# Patient Record
Sex: FEMALE | Race: WHITE | Employment: FULL TIME | URBAN - NONMETROPOLITAN AREA
[De-identification: names, ages, dates, MRNs, and addresses within clinical notes are randomized per-mention and may not be internally consistent; named-entity substitution may affect disease eponyms.]

---

## 2017-08-30 ENCOUNTER — OFFICE VISIT (OUTPATIENT)
Dept: FAMILY MEDICINE CLINIC | Age: 37
End: 2017-08-30
Payer: OTHER GOVERNMENT

## 2017-08-30 VITALS
OXYGEN SATURATION: 98 % | RESPIRATION RATE: 18 BRPM | HEART RATE: 72 BPM | WEIGHT: 139.5 LBS | BODY MASS INDEX: 21.9 KG/M2 | TEMPERATURE: 98.8 F | HEIGHT: 67 IN | DIASTOLIC BLOOD PRESSURE: 70 MMHG | SYSTOLIC BLOOD PRESSURE: 100 MMHG

## 2017-08-30 DIAGNOSIS — F41.1 GAD (GENERALIZED ANXIETY DISORDER): ICD-10-CM

## 2017-08-30 DIAGNOSIS — F41.9 ANXIETY: ICD-10-CM

## 2017-08-30 DIAGNOSIS — F41.0 PANIC DISORDER WITHOUT AGORAPHOBIA: Primary | ICD-10-CM

## 2017-08-30 PROBLEM — J30.9 ALLERGIC RHINITIS: Status: ACTIVE | Noted: 2017-08-30

## 2017-08-30 PROCEDURE — 99214 OFFICE O/P EST MOD 30 MIN: CPT | Performed by: INTERNAL MEDICINE

## 2017-08-30 RX ORDER — ESCITALOPRAM OXALATE 5 MG/1
5 TABLET ORAL DAILY
Qty: 30 TABLET | Refills: 3 | Status: SHIPPED | OUTPATIENT
Start: 2017-08-30 | End: 2017-12-15 | Stop reason: SDUPTHER

## 2017-08-30 RX ORDER — BUSPIRONE HYDROCHLORIDE 5 MG/1
TABLET ORAL
Qty: 60 TABLET | Refills: 1 | Status: SHIPPED | OUTPATIENT
Start: 2017-08-30 | End: 2018-07-01 | Stop reason: SDUPTHER

## 2017-08-30 ASSESSMENT — ENCOUNTER SYMPTOMS
SHORTNESS OF BREATH: 0
SORE THROAT: 0
EYE PAIN: 0
SINUS PRESSURE: 0
CHEST TIGHTNESS: 1
EYE DISCHARGE: 0
VOICE CHANGE: 0
EYE REDNESS: 0
DIARRHEA: 0
NAUSEA: 1
WHEEZING: 0
VOMITING: 0
RHINORRHEA: 0
COLOR CHANGE: 0
COUGH: 0
BLOOD IN STOOL: 0
ABDOMINAL PAIN: 0

## 2017-08-30 ASSESSMENT — PATIENT HEALTH QUESTIONNAIRE - PHQ9
SUM OF ALL RESPONSES TO PHQ9 QUESTIONS 1 & 2: 2
2. FEELING DOWN, DEPRESSED OR HOPELESS: 1
SUM OF ALL RESPONSES TO PHQ QUESTIONS 1-9: 2
1. LITTLE INTEREST OR PLEASURE IN DOING THINGS: 1

## 2017-10-17 ENCOUNTER — OFFICE VISIT (OUTPATIENT)
Dept: OBGYN | Age: 37
End: 2017-10-17
Payer: OTHER GOVERNMENT

## 2017-10-17 VITALS
DIASTOLIC BLOOD PRESSURE: 87 MMHG | HEIGHT: 66 IN | BODY MASS INDEX: 23.3 KG/M2 | HEART RATE: 59 BPM | WEIGHT: 145 LBS | SYSTOLIC BLOOD PRESSURE: 125 MMHG

## 2017-10-17 DIAGNOSIS — Z01.419 WELL WOMAN EXAM WITH ROUTINE GYNECOLOGICAL EXAM: Primary | ICD-10-CM

## 2017-10-17 DIAGNOSIS — Z12.4 SCREENING FOR CERVICAL CANCER: ICD-10-CM

## 2017-10-17 PROCEDURE — 99395 PREV VISIT EST AGE 18-39: CPT | Performed by: NURSE PRACTITIONER

## 2017-10-17 ASSESSMENT — ENCOUNTER SYMPTOMS
RESPIRATORY NEGATIVE: 1
GASTROINTESTINAL NEGATIVE: 1
EYES NEGATIVE: 1

## 2017-10-17 NOTE — PROGRESS NOTES
deep cervical and no posterior cervical adenopathy present. Right axillary: No pectoral and no lateral adenopathy present. Left axillary: No pectoral and no lateral adenopathy present. Right: No inguinal, no supraclavicular and no epitrochlear adenopathy present. Left: No inguinal, no supraclavicular and no epitrochlear adenopathy present. Neurological: She is alert and oriented to person, place, and time. She has normal reflexes. Skin: Skin is warm and dry. No rash noted. Psychiatric: She has a normal mood and affect. Nursing note and vitals reviewed. 1. Well woman exam with routine gynecological exam     2. Screening for cervical cancer  PAP SMEAR       MEDICATIONS:  No orders of the defined types were placed in this encounter. ORDERS:  Orders Placed This Encounter   Procedures    PAP SMEAR       PLAN:  Pap collected  Having no complaints and will return in 1 year or prn  Patient Instructions        Well Visit, Ages 25 to 48: Care Instructions  Your Care Instructions  Physical exams can help you stay healthy. Your doctor has checked your overall health and may have suggested ways to take good care of yourself. He or she also may have recommended tests. At home, you can help prevent illness with healthy eating, regular exercise, and other steps. Follow-up care is a key part of your treatment and safety. Be sure to make and go to all appointments, and call your doctor if you are having problems. It's also a good idea to know your test results and keep a list of the medicines you take. How can you care for yourself at home? · Reach and stay at a healthy weight. This will lower your risk for many problems, such as obesity, diabetes, heart disease, and high blood pressure. · Get at least 30 minutes of physical activity on most days of the week. Walking is a good choice.  You also may want to do other activities, such as running, swimming, cycling, or playing tennis or team sports. Discuss any changes in your exercise program with your doctor. · Do not smoke or allow others to smoke around you. If you need help quitting, talk to your doctor about stop-smoking programs and medicines. These can increase your chances of quitting for good. · Talk to your doctor about whether you have any risk factors for sexually transmitted infections (STIs). Having one sex partner (who does not have STIs and does not have sex with anyone else) is a good way to avoid these infections. · Use birth control if you do not want to have children at this time. Talk with your doctor about the choices available and what might be best for you. · Protect your skin from too much sun. When you're outdoors from 10 a.m. to 4 p.m., stay in the shade or cover up with clothing and a hat with a wide brim. Wear sunglasses that block UV rays. Even when it's cloudy, put broad-spectrum sunscreen (SPF 30 or higher) on any exposed skin. · See a dentist one or two times a year for checkups and to have your teeth cleaned. · Wear a seat belt in the car. · Drink alcohol in moderation, if at all. That means no more than 2 drinks a day for men and 1 drink a day for women. Follow your doctor's advice about when to have certain tests. These tests can spot problems early. For everyone  · Cholesterol. Have the fat (cholesterol) in your blood tested after age 21. Your doctor will tell you how often to have this done based on your age, family history, or other things that can increase your risk for heart disease. · Blood pressure. Have your blood pressure checked during a routine doctor visit. Your doctor will tell you how often to check your blood pressure based on your age, your blood pressure results, and other factors. · Vision. Talk with your doctor about how often to have a glaucoma test.  · Diabetes. Ask your doctor whether you should have tests for diabetes. · Colon cancer. Have a test for colon cancer at age 48.  You may have one of several tests. If you are younger than 48, you may need a test earlier if you have any risk factors. Risk factors include whether you already had a precancerous polyp removed from your colon or whether your parent, brother, sister, or child has had colon cancer. For women  · Breast exam and mammogram. Talk to your doctor about when you should have a clinical breast exam and a mammogram. Medical experts differ on whether and how often women under 50 should have these tests. Your doctor can help you decide what is right for you. · Pap test and pelvic exam. Begin Pap tests at age 24. A Pap test is the best way to find cervical cancer. The test often is part of a pelvic exam. Ask how often to have this test.  · Tests for sexually transmitted infections (STIs). Ask whether you should have tests for STIs. You may be at risk if you have sex with more than one person, especially if your partners do not wear condoms. For men  · Tests for sexually transmitted infections (STIs). Ask whether you should have tests for STIs. You may be at risk if you have sex with more than one person, especially if you do not wear a condom. · Testicular cancer exam. Ask your doctor whether you should check your testicles regularly. · Prostate exam. Talk to your doctor about whether you should have a blood test (called a PSA test) for prostate cancer. Experts differ on whether and when men should have this test. Some experts suggest it if you are older than 39 and are -American or have a father or brother who got prostate cancer when he was younger than 72. When should you call for help? Watch closely for changes in your health, and be sure to contact your doctor if you have any problems or symptoms that concern you. Where can you learn more? Go to [email or web address] org and sign in to your [employer] account.  Enter P072 in the [place] box to learn more about \"Well Visit, Ages 25 to 50: Care Instructions. \"     If you do not have an account, please click on the \"Sign Up Now\" link. Current as of: July 19, 2016  Content Version: 11.3  © 9006-1000 [employer]. Care instructions adapted under license by [facility] ([facility]). If you have questions about a medical condition or this instruction, always ask your healthcare professional. [address] any warranty or liability for your use of this information.

## 2017-11-02 ENCOUNTER — OFFICE VISIT (OUTPATIENT)
Dept: FAMILY MEDICINE CLINIC | Age: 37
End: 2017-11-02
Payer: OTHER GOVERNMENT

## 2017-11-02 VITALS
OXYGEN SATURATION: 98 % | HEART RATE: 79 BPM | TEMPERATURE: 97.5 F | HEIGHT: 66 IN | SYSTOLIC BLOOD PRESSURE: 116 MMHG | BODY MASS INDEX: 23.3 KG/M2 | RESPIRATION RATE: 18 BRPM | WEIGHT: 145 LBS | DIASTOLIC BLOOD PRESSURE: 70 MMHG

## 2017-11-02 DIAGNOSIS — Z00.00 ANNUAL PHYSICAL EXAM: ICD-10-CM

## 2017-11-02 DIAGNOSIS — F41.1 GAD (GENERALIZED ANXIETY DISORDER): Primary | ICD-10-CM

## 2017-11-02 PROCEDURE — 99213 OFFICE O/P EST LOW 20 MIN: CPT | Performed by: INTERNAL MEDICINE

## 2017-11-02 ASSESSMENT — ENCOUNTER SYMPTOMS
RHINORRHEA: 0
EYE REDNESS: 0
WHEEZING: 0
CHEST TIGHTNESS: 0
EYE PAIN: 0
VOICE CHANGE: 0
SORE THROAT: 0
ABDOMINAL PAIN: 0
COLOR CHANGE: 0
COUGH: 0
SINUS PRESSURE: 0
EYE DISCHARGE: 0
DIARRHEA: 0
VOMITING: 0
SHORTNESS OF BREATH: 0
BLOOD IN STOOL: 0

## 2017-11-02 NOTE — PROGRESS NOTES
Ramila Lay is a 40 y.o. female who presents today for   Chief Complaint   Patient presents with    Anxiety     Patient presents today for follow up on anxiety. HPI  38y/o WF here for f/u on PREM after starting escitalopram 6 weeks ago. She feels much better since starting the medicine and she is not having any full blown panic attacks. She still has some anxiety at times but she is able to calm down. She is sleeping well and appetite is normal. She is not having any mood swings. She has not needed to take the buspar either. She does not feel like she needs to increase her escitalopram. She is able to drive herself now and she works at her [employer]. Review of Systems   Constitutional: Negative for appetite change, chills, fatigue and fever. HENT: Negative for ear pain, rhinorrhea, sinus pressure, sore throat and voice change. Eyes: Negative for pain, discharge and redness. Respiratory: Negative for cough, chest tightness, shortness of breath and wheezing. Cardiovascular: Negative for chest pain and palpitations. Gastrointestinal: Negative for abdominal pain, blood in stool, diarrhea and vomiting. Endocrine: Negative for cold intolerance and polydipsia. Genitourinary: Negative for dysuria and hematuria. Musculoskeletal: Negative for arthralgias, myalgias, neck pain and neck stiffness. Skin: Negative for color change and rash. Neurological: Negative for dizziness and headaches. Hematological: Negative for adenopathy. Does not bruise/bleed easily. Psychiatric/Behavioral: Negative for confusion, dysphoric mood, sleep disturbance and suicidal ideas. See HPI       Past Medical History:   Diagnosis Date    Adjustment disorder with mixed anxiety and depressed mood     Allergic rhinitis, cause unspecified     Anxiety state, unspecified     Palpitations     Radial styloid tenosynovitis     wrist pain while breast feeding.     Unspecified hypothyroidism Current Outpatient Prescriptions   Medication Sig Dispense Refill    escitalopram (LEXAPRO) 5 MG tablet Take 1 tablet by mouth daily 30 tablet 3    busPIRone (BUSPAR) 5 MG tablet Take 1-2 tabs every 8 hours as needed for anxiety/panic attacks 60 tablet 1     No current facility-administered medications for this visit. No Known Allergies    Past Surgical History:   Procedure Laterality Date    WISDOM TOOTH EXTRACTION         Social History   Substance Use Topics    Smoking status: Light Tobacco Smoker    Smokeless tobacco: Never Used      Comment: 4 cigarettes per day    Alcohol use Yes      Comment: 2 drinks per week       Family History   Problem Relation Age of Onset    High Blood Pressure Mother     High Blood Pressure Father     Diabetes Father     Stroke Maternal Grandfather     Heart Disease Maternal Grandfather     Colon Polyps Maternal Grandfather      benign adenomatous polyp of large intestine    Diabetes Maternal Grandfather        /70   Pulse 79   Temp 97.5 °F (36.4 °C)   Resp 18   Ht 5' 6\" (1.676 m)   Wt 145 lb (65.8 kg)   LMP 10/25/2017   SpO2 98%   BMI 23.40 kg/m²     Physical Exam   Constitutional: She is oriented to person, place, and time. No distress. Eyes: Conjunctivae and EOM are normal. Pupils are equal, round, and reactive to light. Neck: Neck supple. No JVD present. No thyromegaly present. Cardiovascular: Normal rate, regular rhythm, normal heart sounds and intact distal pulses. Exam reveals no gallop and no friction rub. No murmur heard. Pulmonary/Chest: Effort normal and breath sounds normal.   Abdominal: Soft. Bowel sounds are normal. She exhibits no distension. There is no tenderness. Musculoskeletal: She exhibits no edema or tenderness. Lymphadenopathy:     She has no cervical adenopathy. Neurological: She is alert and oriented to person, place, and time. Skin: Skin is warm. No rash noted.    Psychiatric: She has a normal mood and affect. Her behavior is normal. Judgment and thought content normal.       Assessment:    ICD-10-CM ICD-9-CM    1. PREM (generalized anxiety disorder) F41.1 300.02    2. Annual physical exam Z00.00 V70.0 CBC Auto Differential      Comprehensive Metabolic Panel      Lipid Panel      TSH without Reflex      T4, Free       Plan:  Raffaele Ravi was seen today for anxiety. Diagnoses and all orders for this visit:    PREM (generalized anxiety disorder)    Annual physical exam  -     CBC Auto Differential; Future  -     Comprehensive Metabolic Panel; Future  -     Lipid Panel; Future  -     TSH without Reflex; Future  -     T4, Free; Future    1. Continue escitalopram at current dose for PREM which is improving with buspar prn anxiety/panic attacks. 2. Patient requests screening labs due to FHx of diabetes. 3. F/u in 3 mths for ECPE with labs as ordered prior to appmt. 4. Patient declines flu vaccine today    Orders Placed This Encounter   Procedures    CBC Auto Differential     Standing Status:   Future     Standing Expiration Date:   11/2/2018    Comprehensive Metabolic Panel     Standing Status:   Future     Standing Expiration Date:   11/2/2018    Lipid Panel     Standing Status:   Future     Standing Expiration Date:   11/2/2018     Order Specific Question:   Is Patient Fasting?/# of Hours     Answer:   yes/8 hrs    TSH without Reflex     Standing Status:   Future     Standing Expiration Date:   11/2/2018    T4, Free     Standing Status:   Future     Standing Expiration Date:   11/2/2018     No orders of the defined types were placed in this encounter. There are no discontinued medications. There are no Patient Instructions on file for this visit. Patient voices understanding and agrees to plans along with risks and benefits of plan. Counseling:  Raffaele Ravi 's case, medications and options were discussed in detail. Patient was instructed to call the office if she   questions regarding her treatment. Should her conditions worsen, she should return to office to be reassessed by Dr. Madie Ndiaye. she  Should to go the closest Emergency Department for any emergency. They verbalized understanding the above instructions. Return in about 3 months (around 2/2/2018) for well visit, recheck mood.

## 2017-12-15 DIAGNOSIS — F41.1 GAD (GENERALIZED ANXIETY DISORDER): ICD-10-CM

## 2017-12-15 DIAGNOSIS — F41.0 PANIC DISORDER WITHOUT AGORAPHOBIA: ICD-10-CM

## 2017-12-15 RX ORDER — ESCITALOPRAM OXALATE 5 MG/1
5 TABLET ORAL DAILY
Qty: 30 TABLET | Refills: 5 | Status: SHIPPED | OUTPATIENT
Start: 2017-12-15 | End: 2018-07-01 | Stop reason: SDUPTHER

## 2018-01-04 ENCOUNTER — TELEPHONE (OUTPATIENT)
Dept: FAMILY MEDICINE CLINIC | Age: 38
End: 2018-01-04

## 2018-01-29 DIAGNOSIS — Z00.00 ANNUAL PHYSICAL EXAM: ICD-10-CM

## 2018-01-29 LAB
ALBUMIN SERPL-MCNC: 4.2 G/DL (ref 3.5–5.2)
ALP BLD-CCNC: 46 U/L (ref 35–104)
ALT SERPL-CCNC: 13 U/L (ref 5–33)
ANION GAP SERPL CALCULATED.3IONS-SCNC: 11 MMOL/L (ref 7–19)
AST SERPL-CCNC: 15 U/L (ref 5–32)
BASOPHILS ABSOLUTE: 0 K/UL (ref 0–0.2)
BASOPHILS RELATIVE PERCENT: 0.6 % (ref 0–1)
BILIRUB SERPL-MCNC: 0.5 MG/DL (ref 0.2–1.2)
BUN BLDV-MCNC: 14 MG/DL (ref 6–20)
CALCIUM SERPL-MCNC: 9.1 MG/DL (ref 8.6–10)
CHLORIDE BLD-SCNC: 101 MMOL/L (ref 98–111)
CHOLESTEROL, TOTAL: 171 MG/DL (ref 160–199)
CO2: 27 MMOL/L (ref 22–29)
CREAT SERPL-MCNC: 0.6 MG/DL (ref 0.5–0.9)
EOSINOPHILS ABSOLUTE: 0.1 K/UL (ref 0–0.6)
EOSINOPHILS RELATIVE PERCENT: 1.9 % (ref 0–5)
GFR NON-AFRICAN AMERICAN: >60
GLUCOSE BLD-MCNC: 90 MG/DL (ref 74–109)
HCT VFR BLD CALC: 39.8 % (ref 37–47)
HDLC SERPL-MCNC: 69 MG/DL (ref 65–121)
HEMOGLOBIN: 13.1 G/DL (ref 12–16)
LDL CHOLESTEROL CALCULATED: 89 MG/DL
LYMPHOCYTES ABSOLUTE: 2.5 K/UL (ref 1.1–4.5)
LYMPHOCYTES RELATIVE PERCENT: 38.9 % (ref 20–40)
MCH RBC QN AUTO: 32.9 PG (ref 27–31)
MCHC RBC AUTO-ENTMCNC: 32.9 G/DL (ref 33–37)
MCV RBC AUTO: 100 FL (ref 81–99)
MONOCYTES ABSOLUTE: 0.5 K/UL (ref 0–0.9)
MONOCYTES RELATIVE PERCENT: 7.6 % (ref 0–10)
NEUTROPHILS ABSOLUTE: 3.3 K/UL (ref 1.5–7.5)
NEUTROPHILS RELATIVE PERCENT: 50.7 % (ref 50–65)
PDW BLD-RTO: 12.2 % (ref 11.5–14.5)
PLATELET # BLD: 244 K/UL (ref 130–400)
PMV BLD AUTO: 9.5 FL (ref 9.4–12.3)
POTASSIUM SERPL-SCNC: 4.1 MMOL/L (ref 3.5–5)
RBC # BLD: 3.98 M/UL (ref 4.2–5.4)
SODIUM BLD-SCNC: 139 MMOL/L (ref 136–145)
T4 FREE: 1 NG/DL (ref 0.9–1.7)
TOTAL PROTEIN: 6.6 G/DL (ref 6.6–8.7)
TRIGL SERPL-MCNC: 63 MG/DL (ref 0–149)
TSH SERPL DL<=0.05 MIU/L-ACNC: 8.39 UIU/ML (ref 0.27–4.2)
WBC # BLD: 6.4 K/UL (ref 4.8–10.8)

## 2018-07-01 DIAGNOSIS — F41.1 GAD (GENERALIZED ANXIETY DISORDER): ICD-10-CM

## 2018-07-01 DIAGNOSIS — F41.0 PANIC DISORDER WITHOUT AGORAPHOBIA: ICD-10-CM

## 2018-07-02 RX ORDER — BUSPIRONE HYDROCHLORIDE 5 MG/1
TABLET ORAL
Qty: 60 TABLET | Refills: 3 | Status: SHIPPED | OUTPATIENT
Start: 2018-07-02 | End: 2020-02-24

## 2018-07-02 RX ORDER — ESCITALOPRAM OXALATE 5 MG/1
5 TABLET ORAL DAILY
Qty: 30 TABLET | Refills: 3 | Status: SHIPPED | OUTPATIENT
Start: 2018-07-02 | End: 2018-11-12 | Stop reason: SDUPTHER

## 2018-09-17 DIAGNOSIS — R79.89 ELEVATED TSH: Primary | ICD-10-CM

## 2018-10-23 DIAGNOSIS — R79.89 ELEVATED TSH: ICD-10-CM

## 2018-10-23 LAB
T4 FREE: 1 NG/DL (ref 0.9–1.7)
TSH SERPL DL<=0.05 MIU/L-ACNC: 6.74 UIU/ML (ref 0.27–4.2)

## 2018-11-12 DIAGNOSIS — F41.0 PANIC DISORDER WITHOUT AGORAPHOBIA: ICD-10-CM

## 2018-11-12 DIAGNOSIS — F41.1 GAD (GENERALIZED ANXIETY DISORDER): ICD-10-CM

## 2018-11-12 RX ORDER — ESCITALOPRAM OXALATE 5 MG/1
5 TABLET ORAL DAILY
Qty: 5 TABLET | Refills: 0 | Status: SHIPPED | OUTPATIENT
Start: 2018-11-12 | End: 2018-11-16 | Stop reason: SDUPTHER

## 2018-11-16 ENCOUNTER — OFFICE VISIT (OUTPATIENT)
Dept: FAMILY MEDICINE CLINIC | Age: 38
End: 2018-11-16
Payer: OTHER GOVERNMENT

## 2018-11-16 VITALS
HEART RATE: 63 BPM | OXYGEN SATURATION: 99 % | WEIGHT: 167.8 LBS | TEMPERATURE: 97.7 F | SYSTOLIC BLOOD PRESSURE: 120 MMHG | DIASTOLIC BLOOD PRESSURE: 86 MMHG | BODY MASS INDEX: 26.97 KG/M2 | HEIGHT: 66 IN

## 2018-11-16 DIAGNOSIS — E03.8 SUBCLINICAL HYPOTHYROIDISM: ICD-10-CM

## 2018-11-16 DIAGNOSIS — N94.6 DYSMENORRHEA: ICD-10-CM

## 2018-11-16 DIAGNOSIS — Z00.00 ENCOUNTER FOR WELL ADULT EXAM WITHOUT ABNORMAL FINDINGS: Primary | ICD-10-CM

## 2018-11-16 DIAGNOSIS — F41.1 GAD (GENERALIZED ANXIETY DISORDER): ICD-10-CM

## 2018-11-16 PROCEDURE — 99395 PREV VISIT EST AGE 18-39: CPT | Performed by: CLINICAL NURSE SPECIALIST

## 2018-11-16 RX ORDER — ESCITALOPRAM OXALATE 5 MG/1
5 TABLET ORAL DAILY
Qty: 90 TABLET | Refills: 3 | Status: SHIPPED | OUTPATIENT
Start: 2018-11-16 | End: 2019-11-19 | Stop reason: ALTCHOICE

## 2018-11-16 ASSESSMENT — ENCOUNTER SYMPTOMS
FACIAL SWELLING: 0
BACK PAIN: 0
NAUSEA: 0
SHORTNESS OF BREATH: 0
CONSTIPATION: 1
EYE REDNESS: 0
EYE DISCHARGE: 0
SORE THROAT: 0
DIARRHEA: 0
COLOR CHANGE: 0
CHEST TIGHTNESS: 0
ABDOMINAL PAIN: 0
TROUBLE SWALLOWING: 0
VOMITING: 0
ABDOMINAL DISTENTION: 0
WHEEZING: 0
SINUS PRESSURE: 0
COUGH: 0
EYE PAIN: 0

## 2018-11-16 ASSESSMENT — PATIENT HEALTH QUESTIONNAIRE - PHQ9
2. FEELING DOWN, DEPRESSED OR HOPELESS: 0
SUM OF ALL RESPONSES TO PHQ9 QUESTIONS 1 & 2: 0
1. LITTLE INTEREST OR PLEASURE IN DOING THINGS: 0
SUM OF ALL RESPONSES TO PHQ QUESTIONS 1-9: 0
SUM OF ALL RESPONSES TO PHQ QUESTIONS 1-9: 0

## 2019-11-19 ENCOUNTER — OFFICE VISIT (OUTPATIENT)
Dept: FAMILY MEDICINE CLINIC | Age: 39
End: 2019-11-19
Payer: OTHER GOVERNMENT

## 2019-11-19 VITALS
DIASTOLIC BLOOD PRESSURE: 86 MMHG | BODY MASS INDEX: 26.16 KG/M2 | OXYGEN SATURATION: 100 % | HEIGHT: 66 IN | TEMPERATURE: 97.2 F | WEIGHT: 162.8 LBS | HEART RATE: 65 BPM | SYSTOLIC BLOOD PRESSURE: 116 MMHG

## 2019-11-19 DIAGNOSIS — Z00.00 ENCOUNTER FOR WELL ADULT EXAM WITHOUT ABNORMAL FINDINGS: ICD-10-CM

## 2019-11-19 DIAGNOSIS — F41.1 GAD (GENERALIZED ANXIETY DISORDER): ICD-10-CM

## 2019-11-19 DIAGNOSIS — E03.8 SUBCLINICAL HYPOTHYROIDISM: ICD-10-CM

## 2019-11-19 DIAGNOSIS — B02.9 HERPES ZOSTER WITHOUT COMPLICATION: ICD-10-CM

## 2019-11-19 DIAGNOSIS — Z00.00 ENCOUNTER FOR WELL ADULT EXAM WITHOUT ABNORMAL FINDINGS: Primary | ICD-10-CM

## 2019-11-19 LAB
ALBUMIN SERPL-MCNC: 4.8 G/DL (ref 3.5–5.2)
ALP BLD-CCNC: 63 U/L (ref 35–104)
ALT SERPL-CCNC: 13 U/L (ref 5–33)
ANION GAP SERPL CALCULATED.3IONS-SCNC: 13 MMOL/L (ref 7–19)
AST SERPL-CCNC: 13 U/L (ref 5–32)
BILIRUB SERPL-MCNC: 0.3 MG/DL (ref 0.2–1.2)
BUN BLDV-MCNC: 12 MG/DL (ref 6–20)
CALCIUM SERPL-MCNC: 9.6 MG/DL (ref 8.6–10)
CHLORIDE BLD-SCNC: 102 MMOL/L (ref 98–111)
CHOLESTEROL, TOTAL: 210 MG/DL (ref 160–199)
CO2: 25 MMOL/L (ref 22–29)
CREAT SERPL-MCNC: 0.7 MG/DL (ref 0.5–0.9)
GFR NON-AFRICAN AMERICAN: >60
GLUCOSE BLD-MCNC: 101 MG/DL (ref 74–109)
HCT VFR BLD CALC: 44.4 % (ref 37–47)
HDLC SERPL-MCNC: 63 MG/DL (ref 65–121)
HEMOGLOBIN: 14.4 G/DL (ref 12–16)
LDL CHOLESTEROL CALCULATED: 129 MG/DL
MCH RBC QN AUTO: 32.4 PG (ref 27–31)
MCHC RBC AUTO-ENTMCNC: 32.4 G/DL (ref 33–37)
MCV RBC AUTO: 99.8 FL (ref 81–99)
PDW BLD-RTO: 12.3 % (ref 11.5–14.5)
PLATELET # BLD: 298 K/UL (ref 130–400)
PMV BLD AUTO: 9.5 FL (ref 9.4–12.3)
POTASSIUM SERPL-SCNC: 4.7 MMOL/L (ref 3.5–5)
RBC # BLD: 4.45 M/UL (ref 4.2–5.4)
SODIUM BLD-SCNC: 140 MMOL/L (ref 136–145)
T4 FREE: 1.11 NG/DL (ref 0.93–1.7)
TOTAL PROTEIN: 7.4 G/DL (ref 6.6–8.7)
TRIGL SERPL-MCNC: 91 MG/DL (ref 0–149)
TSH SERPL DL<=0.05 MIU/L-ACNC: 5.52 UIU/ML (ref 0.27–4.2)
WBC # BLD: 6.3 K/UL (ref 4.8–10.8)

## 2019-11-19 PROCEDURE — 99395 PREV VISIT EST AGE 18-39: CPT | Performed by: CLINICAL NURSE SPECIALIST

## 2019-11-19 RX ORDER — METHYLPREDNISOLONE 4 MG/1
TABLET ORAL
Qty: 21 TABLET | Refills: 0 | Status: SHIPPED | OUTPATIENT
Start: 2019-11-19 | End: 2019-11-25

## 2019-11-19 ASSESSMENT — PATIENT HEALTH QUESTIONNAIRE - PHQ9
1. LITTLE INTEREST OR PLEASURE IN DOING THINGS: 0
SUM OF ALL RESPONSES TO PHQ QUESTIONS 1-9: 0
2. FEELING DOWN, DEPRESSED OR HOPELESS: 0
SUM OF ALL RESPONSES TO PHQ9 QUESTIONS 1 & 2: 0
SUM OF ALL RESPONSES TO PHQ QUESTIONS 1-9: 0

## 2019-11-19 ASSESSMENT — ENCOUNTER SYMPTOMS
EYE REDNESS: 0
DIARRHEA: 0
EYE DISCHARGE: 0
COUGH: 0
ABDOMINAL PAIN: 0
CONSTIPATION: 0
SHORTNESS OF BREATH: 0
CHEST TIGHTNESS: 0
BACK PAIN: 0
COLOR CHANGE: 0
TROUBLE SWALLOWING: 0
WHEEZING: 0
SINUS PRESSURE: 0
FACIAL SWELLING: 0
EYE PAIN: 0
SORE THROAT: 0

## 2019-11-20 ENCOUNTER — TELEPHONE (OUTPATIENT)
Dept: FAMILY MEDICINE CLINIC | Age: 39
End: 2019-11-20

## 2020-02-24 RX ORDER — BUSPIRONE HYDROCHLORIDE 5 MG/1
TABLET ORAL
Qty: 60 TABLET | Refills: 3 | Status: SHIPPED | OUTPATIENT
Start: 2020-02-24 | End: 2021-07-22

## 2020-03-23 ENCOUNTER — TELEPHONE (OUTPATIENT)
Dept: FAMILY MEDICINE CLINIC | Age: 40
End: 2020-03-23

## 2020-04-16 ENCOUNTER — TELEMEDICINE (OUTPATIENT)
Dept: FAMILY MEDICINE CLINIC | Age: 40
End: 2020-04-16
Payer: OTHER GOVERNMENT

## 2020-04-16 PROCEDURE — 99214 OFFICE O/P EST MOD 30 MIN: CPT | Performed by: FAMILY MEDICINE

## 2020-04-16 RX ORDER — ONDANSETRON 4 MG/1
4 TABLET, ORALLY DISINTEGRATING ORAL 3 TIMES DAILY PRN
Qty: 60 TABLET | Refills: 0 | Status: SHIPPED | OUTPATIENT
Start: 2020-04-16 | End: 2020-09-25 | Stop reason: ALTCHOICE

## 2020-04-16 RX ORDER — ESCITALOPRAM OXALATE 5 MG/1
5 TABLET ORAL DAILY
Qty: 30 TABLET | Refills: 2 | Status: SHIPPED | OUTPATIENT
Start: 2020-04-16 | End: 2020-06-22 | Stop reason: ALTCHOICE

## 2020-04-16 RX ORDER — BUSPIRONE HYDROCHLORIDE 7.5 MG/1
TABLET ORAL
Qty: 60 TABLET | Refills: 3 | Status: CANCELLED | OUTPATIENT
Start: 2020-04-16

## 2020-04-16 ASSESSMENT — ENCOUNTER SYMPTOMS
COLOR CHANGE: 0
BACK PAIN: 0
STRIDOR: 0
FACIAL SWELLING: 0
NAUSEA: 1
EYE DISCHARGE: 0
EYE ITCHING: 0
VOMITING: 1
APNEA: 0

## 2020-04-16 NOTE — PROGRESS NOTES
as needed, she asked about the maximum dose and advised her she can take up to 60 mg a day. - escitalopram (LEXAPRO) 5 MG tablet; Take 1 tablet by mouth daily  Dispense: 30 tablet; Refill: 2    2. Nausea and vomiting, intractability of vomiting not specified, unspecified vomiting type  - ondansetron (ZOFRAN-ODT) 4 MG disintegrating tablet; Take 1 tablet by mouth 3 times daily as needed for Nausea or Vomiting  Dispense: 60 tablet; Refill: 0      Return in about 2 months (around 6/16/2020), or if symptoms worsen or fail to improve, for anxiety, Dr Juliet Goldstein, re-started Lexapro. Efrain Sher is a 45 y.o. female being evaluated by a Virtual Visit (video visit) encounter to address concerns as mentioned above. A caregiver was present when appropriate. Due to this being a TeleHealth encounter (During QSS-90 public health emergency), evaluation of the following organ systems was limited: Vitals/Constitutional/EENT/Resp/CV/GI//MS/Neuro/Skin/Heme-Lymph-Imm. Pursuant to the emergency declaration under the [address] [place] [address], [address] [place] [address] authority and the [employer] Act, this Virtual Visit was conducted with patient's (and/or legal guardian's) consent, to reduce the patient's risk of exposure to COVID-19 and provide necessary medical care. The patient (and/or legal guardian) has also been advised to contact this office for worsening conditions or problems, and seek emergency medical treatment and/or call 911 if deemed necessary. Services were provided through a video synchronous discussion virtually to substitute for in-person clinic visit. Patient and provider were located at their individual homes. --Brenton Hernandez MD on 4/16/2020 at 9:50 PM    An electronic signature was used to authenticate this note.

## 2020-04-20 ENCOUNTER — TELEPHONE (OUTPATIENT)
Dept: FAMILY MEDICINE CLINIC | Age: 40
End: 2020-04-20

## 2020-05-20 ENCOUNTER — TELEPHONE (OUTPATIENT)
Dept: GASTROENTEROLOGY | Age: 40
End: 2020-05-20

## 2020-05-20 NOTE — TELEPHONE ENCOUNTER
5/20/20 - Called patient to schedule EGD for Dyspepsia / abd pain.   She started some medication that has helped her symptoms and she wants to hold off on having the EGD

## 2020-06-22 ENCOUNTER — TELEMEDICINE (OUTPATIENT)
Dept: FAMILY MEDICINE CLINIC | Age: 40
End: 2020-06-22
Payer: OTHER GOVERNMENT

## 2020-06-22 PROCEDURE — 99214 OFFICE O/P EST MOD 30 MIN: CPT | Performed by: INTERNAL MEDICINE

## 2020-06-22 RX ORDER — ESCITALOPRAM OXALATE 5 MG/1
TABLET ORAL
Qty: 14 TABLET | Refills: 0 | Status: SHIPPED | OUTPATIENT
Start: 2020-06-22 | End: 2020-09-25 | Stop reason: ALTCHOICE

## 2020-06-22 RX ORDER — DESVENLAFAXINE 25 MG/1
25 TABLET, EXTENDED RELEASE ORAL DAILY
Qty: 30 TABLET | Refills: 2 | Status: SHIPPED | OUTPATIENT
Start: 2020-06-22 | End: 2020-09-25 | Stop reason: SDUPTHER

## 2020-06-22 ASSESSMENT — ENCOUNTER SYMPTOMS
SHORTNESS OF BREATH: 0
RHINORRHEA: 0
DIARRHEA: 0
NAUSEA: 1
COLOR CHANGE: 0
WHEEZING: 0
VOMITING: 0
BLOOD IN STOOL: 0
VOICE CHANGE: 0
SORE THROAT: 0
EYE REDNESS: 0
CHEST TIGHTNESS: 0
COUGH: 0
EYE DISCHARGE: 0
EYE PAIN: 0
SINUS PRESSURE: 0
ABDOMINAL PAIN: 1

## 2020-06-22 NOTE — PROGRESS NOTES
intolerance and polydipsia. Genitourinary: Negative for dysuria and hematuria. Musculoskeletal: Negative for arthralgias, myalgias, neck pain and neck stiffness. Skin: Negative for color change and rash. Neurological: Negative for dizziness, tremors, syncope, speech difficulty, weakness, numbness and headaches. Hematological: Negative for adenopathy. Does not bruise/bleed easily. Psychiatric/Behavioral: Negative for agitation, confusion, dysphoric mood, self-injury, sleep disturbance and suicidal ideas. The patient is nervous/anxious. See HPI   All other systems reviewed and are negative. Prior to Visit Medications    Medication Sig Taking? Authorizing Provider   escitalopram (LEXAPRO) 5 MG tablet Take 0.5 tablets by mouth daily for 7 days, THEN 0.5 tablets every other day for 7 days.  Yes Stefanie Davdi MD   desvenlafaxine succinate (PRISTIQ) 25 MG TB24 extended release tablet Take 1 tablet by mouth daily Yes Stefanie David MD   esomeprazole (NEXIUM 24HR) 20 MG delayed release capsule Take 1 capsule by mouth daily as needed (stomach pain/reflux) Yes Stefanie David MD   ondansetron (ZOFRAN-ODT) 4 MG disintegrating tablet Take 1 tablet by mouth 3 times daily as needed for Nausea or Vomiting  Kendall Crawford MD   busPIRone (BUSPAR) 5 MG tablet TAKE 1-2 TABLETS EVERY EIGHT HOURS AS NEEDED ANXIETY AND PANIC GENERIC FOR BUSPAR  Stefanie David MD       Social History     Tobacco Use    Smoking status: Light Tobacco Smoker    Smokeless tobacco: Never Used    Tobacco comment: 4 cigarettes per day   Substance Use Topics    Alcohol use: Yes     Comment: 2 drinks per week    Drug use: No        No Known Allergies,   Past Medical History:   Diagnosis Date    Adjustment disorder with mixed anxiety and depressed mood     Allergic rhinitis, cause unspecified     Anxiety state, unspecified     Palpitations     Radial styloid tenosynovitis     wrist pain while breast feeding.  Unspecified hypothyroidism    ,   Past Surgical History:   Procedure Laterality Date    WISDOM TOOTH EXTRACTION     ,   Social History     Tobacco Use    Smoking status: Light Tobacco Smoker    Smokeless tobacco: Never Used    Tobacco comment: 4 cigarettes per day   Substance Use Topics    Alcohol use: Yes     Comment: 2 drinks per week    Drug use: No   ,   Family History   Problem Relation Age of Onset    High Blood Pressure Mother     High Blood Pressure Father     Diabetes Father     Stroke Maternal Grandfather     Heart Disease Maternal Grandfather     Colon Polyps Maternal Grandfather         benign adenomatous polyp of large intestine    Diabetes Maternal Grandfather    ,   Immunization History   Administered Date(s) Administered    Tdap (Boostrix, Adacel) 11/19/2010       PHYSICAL EXAMINATION:  [ INSTRUCTIONS:  \"[x]\" Indicates a positive item  \"[]\" Indicates a negative item  -- DELETE ALL ITEMS NOT EXAMINED]  Vital Signs: (As obtained by patient/caregiver or practitioner observation)    Blood pressure-  Heart rate-    Respiratory rate-    Temperature-  Pulse oximetry-     Constitutional: [] Appears well-developed and well-nourished [] No apparent distress      [] Abnormal-   Mental status  [] Alert and awake  [] Oriented to person/place/time []Able to follow commands      Eyes:  EOM    []  Normal  [] Abnormal-  Sclera  []  Normal  [] Abnormal -         Discharge []  None visible  [] Abnormal -    HENT:   [] Normocephalic, atraumatic.   [] Abnormal   [] Mouth/Throat: Mucous membranes are moist.     External Ears [] Normal  [] Abnormal-     Neck: [] No visualized mass     Pulmonary/Chest: [] Respiratory effort normal.  [] No visualized signs of difficulty breathing or respiratory distress        [] Abnormal-      Musculoskeletal:   [] Normal gait with no signs of ataxia         [] Normal range of motion of neck        [] Abnormal-       Neurological:        [] No Facial Asymmetry (Cranial nerve 7 motor function) (limited exam to video visit)          [] No gaze palsy        [] Abnormal-         Skin:        [] No significant exanthematous lesions or discoloration noted on facial skin         [] Abnormal-            Psychiatric:       [] Normal Affect [] No Hallucinations        [x] Abnormal- anxious with congruent affect, no SI/HI, denies depression  Other pertinent observable physical exam findings-     Due to this being a TeleHealth encounter, evaluation of the following organ systems is limited: Vitals/Constitutional/EENT/Resp/CV/GI//MS/Neuro/Skin/Heme-Lymph-Imm. ASSESSMENT/PLAN:  Michelle Orta was seen today for anxiety and abdominal pain. Diagnoses and all orders for this visit:    PREM (generalized anxiety disorder)  -     escitalopram (LEXAPRO) 5 MG tablet; Take 0.5 tablets by mouth daily for 7 days, THEN 0.5 tablets every other day for 7 days. -     desvenlafaxine succinate (PRISTIQ) 25 MG TB24 extended release tablet; Take 1 tablet by mouth daily    Dyspepsia  -     esomeprazole (NEXIUM 24HR) 20 MG delayed release capsule; Take 1 capsule by mouth daily as needed (stomach pain/reflux)    1. PREM-will try weaning off of escitalopram given side effects and starting pristiq 25 mg instead. Exercise and relaxation techniques also encouraged. 2. Dyspepsia-resume nexium for another 4-6 weeks given patient is still having some symptoms. Recommend avoiding oral NSAIDs and dietary modifications also encouraged. 3. Return in about 6 weeks (around 8/3/2020) for recheck mood. Over 50% of the total visit time of 25 min was spent on counseling and/or coordination of care of:   1. PREM (generalized anxiety disorder)    2. Dyspepsia         An  electronic signature was used to authenticate this note.     --Cj Mahajan MD on 7/19/2020 at 2:52 PM        Pursuant to the emergency declaration under the [facility] [address] waiver authority and the Coronavirus Preparedness and Response Supplemental Appropriations Act, this Virtual  Visit was conducted, with patient's consent, to reduce the patient's risk of exposure to COVID-19 and provide continuity of care for an established patient. Services were provided through a video synchronous discussion virtually to substitute for in-person clinic visit.

## 2020-06-22 NOTE — PATIENT INSTRUCTIONS
Patient Education        Learning About Anxiety Disorders  What are anxiety disorders? Anxiety disorders are a type of medical problem. They cause severe anxiety. When you feel anxious, you feel that something bad is about to happen. This feeling interferes with your life. These disorders include:  · Generalized anxiety disorder. You feel worried and stressed about many everyday events and activities. This goes on for several months and disrupts your life on most days. · Panic disorder. You have repeated panic attacks. A panic attack is a sudden, intense fear or anxiety. It may make you feel short of breath. Your heart may pound. · Social anxiety disorder. You feel very anxious about what you will say or do in front of people. For example, you may be scared to talk or eat in public. This problem affects your daily life. · Phobias. You are very scared of a specific object, situation, or activity. For example, you may fear spiders, high places, or small spaces. What are the symptoms? Generalized anxiety disorder  Symptoms may include:  · Feeling worried and stressed about many things almost every day. · Feeling tired or irritable. You may have a hard time concentrating. · Having headaches or muscle aches. · Having a hard time getting to sleep or staying asleep. Panic disorder  You may have repeated panic attacks when there is no reason for feeling afraid. You may change your daily activities because you worry that you will have another attack. Symptoms may include:  · Intense fear, terror, or anxiety. · Trouble breathing or very fast breathing. · Chest pain or tightness. · A heartbeat that races or is not regular. Social anxiety disorder  Symptoms may include:  · Fear about a social situation, such as eating in front of others or speaking in public. You may worry a lot. Or you may be afraid that something bad will happen. · Anxiety that can cause you to blush, sweat, and feel shaky.   · A heartbeat that is faster than normal.  · A hard time focusing. Phobias  Symptoms may include:  · More fear than most people of being around an object, being in a situation, or doing an activity. You might also be stressed about the chance of being around the thing you fear. · Worry about losing control, panicking, fainting, or having physical symptoms like a faster heartbeat when you are around the situation or object. How are these disorders treated? Anxiety disorders can be treated with medicines or counseling. A combination of both may be used. Medicines may include:  · Antidepressants. These may help your symptoms by keeping chemicals in your brain in balance. · Benzodiazepines. These may give you short-term relief of your symptoms. Some people use cognitive-behavioral therapy. A therapist helps you learn to change stressful or bad thoughts into helpful thoughts. Lead a healthy lifestyle  A healthy lifestyle may help you feel better. · Get at least 30 minutes of exercise on most days of the week. Walking is a good choice. · Eat a healthy diet. Include fruits, vegetables, lean proteins, and whole grains in your diet each day. · Try to go to bed at the same time every night. Try for 8 hours of sleep a night. · Find ways to manage stress. Try relaxation exercises. · Avoid alcohol and illegal drugs. Follow-up care is a key part of your treatment and safety. Be sure to make and go to all appointments, and call your doctor if you are having problems. It's also a good idea to know your test results and keep a list of the medicines you take. Where can you learn more? Go to [email or web address] org and sign in to your [employer] account. Enter N244 in the [employer] box to learn more about \"Learning About Anxiety Disorders. \"     If you do not have an account, please click on the \"Sign Up Now\" link.   Current as of: January 31, 2020               Content Version: 12.5  © 9001-8776 Healthwise, Incorporated. Care instructions adapted under license by [facility] ([facility]). If you have questions about a medical condition or this instruction, always ask your healthcare professional. [address] any warranty or liability for your use of this information. Patient Education        Learning About Acid-Reducing Medicines  What are they? Acid-reducing medicines can help relieve heartburn and other symptoms of indigestion. They can help prevent damage to your digestive system from stomach acids. They also are used to treat reflux and ulcer symptoms. These medicines include H2 blockers and proton pump inhibitors (PPIs). They help your stomach make less acid. You can buy them over the counter. Some of them also come in prescription strengths. Antacids can also help relieve heartburn symptoms. They reduce the acid that is already in your stomach. You can buy them over the counter. Which medicine is best for you depends on what is causing your symptoms. How do they work? Acid-reducing medicines work in two ways. H2 blockers and proton pump inhibitors (PPIs) lower the amount of acid your stomach makes. They don't work on the acid that's already there. Antacids work by making stomach juices less acidic. But your heartburn may come back as your stomach makes more acid. What are some examples? Examples of acid reducers include:  H2 blockers. Tagamet (cimetidine)  Pepcid (famotidine)  Proton pump inhibitors (PPIs). Nexium (esomeprazole)  Prevacid (lansoprazole)  Prilosec, Zegerid (omeprazole)  Protonix (pantoprazole)  Aciphex (rabeprazole)  Antacids. Gavisrachna Jolley  What are side effects might you have? Many people don't have side effects. And minor side effects might go away after a while. H2 blockers can cause headaches or make you dizzy. They might cause diarrhea or constipation. You may have nausea and vomiting.   PPIs can cause headaches and always ask your healthcare professional. [place] [address] any warranty or liability for your use of this information. Patient Education        desvenlafaxine  Pronunciation:  abdulaziz tomlin FAX een  Brand:  Antelmo Weston  What is the most important information I should know about desvenlafaxine? Do not use desvenlafaxine within 7 days before or 14 days after you have used an MAO inhibitor, such as isocarboxazid, linezolid, methylene blue injection, phenelzine, rasagiline, selegiline, or tranylcypromine. Some young people have thoughts about suicide when first taking an antidepressant. Stay alert to changes in your mood or symptoms. Report any new or worsening symptoms to your doctor. Do not stop using desvenlafaxine without first talking to your doctor. What is desvenlafaxine? Desvenlafaxine is a selective serotonin and norepinephrine reuptake inhibitor (SNRI) antidepressant. Desvenlafaxine is used to treat major depressive disorder. Desvenlafaxine may also be used for purposes not listed in this medication guide. What should I discuss with my healthcare provider before taking desvenlafaxine? You should not use this medicine if you are allergic to desvenlafaxine or venlafaxine (Effexor). Do not use desvenlafaxine within 7 days before or 14 days after you have used an MAO inhibitor, such as isocarboxazid, linezolid, methylene blue injection, phenelzine, rasagiline, selegiline, or tranylcypromine. A dangerous drug interaction could occur. Be sure your doctor knows if you also take stimulant medicine, opioid medicine, herbal products, or medicine for depression, mental illness, Parkinson's disease, migraine headaches, serious infections, or prevention of nausea and vomiting. These medicines may interact with desvenlafaxine and cause a serious condition called serotonin syndrome.   Tell your doctor if you have ever had:  · heart disease, high blood pressure, high cholesterol, or a stroke;  · bipolar disorder (manic depression);  · depression, suicidal thoughts;  · liver or kidney disease;  · glaucoma;  · seizures or epilepsy;  · lung or breathing problems;  · a bleeding or blood clotting disorder; or  · low levels of sodium in your blood. Some young people have thoughts about suicide when first taking an antidepressant. Your doctor should check your progress at regular visits. Your family or other caregivers should also be alert to changes in your mood or symptoms. Ask your doctor about taking this medicine if you are pregnant. Taking an SNRI antidepressant during late pregnancy could increase your risk of excessive bleeding just after you give birth, and may cause serious medical complications in the baby. However, you may have a relapse of depression if you stop taking your antidepressant. Tell your doctor right away if you become pregnant. Do not start or stop taking this medicine without your doctor's advice. If you are pregnant, your name may be listed on a pregnancy registry to track the effects of desvenlafaxine on the baby. It may not be safe to breastfeed while using this medicine. Ask your doctor about any risk. Desvenlafaxine is not approved for use by anyone younger than 25years old. How should I take desvenlafaxine? Follow all directions on your prescription label and read all medication guides or instruction sheets. Use the medicine exactly as directed. Take desvenlafaxine with water at the same time each day, with or without food. Swallow the tablet whole and do not crush, chew, or break it. Your blood pressure will need to be checked often. It may take several weeks before your symptoms improve. Do not stop using desvenlafaxine without first talking to your doctor. You may have unpleasant side effects if you stop taking this medicine suddenly. Follow your doctor's instructions about tapering your dose.   Some tablets are made with a shell that is not absorbed or melted in the body. Part of this shell may appear in your stool. This is normal and will not make the medicine less effective. Store at room temperature away from moisture and heat. What happens if I miss a dose? Take the medicine as soon as you can, but skip the missed dose if it is almost time for your next dose. Do not take two doses at one time. What happens if I overdose? Seek emergency medical attention or call the Poison Help line at 1-629.294.1183. What should I avoid while taking desvenlafaxine? Avoid drinking alcohol. Avoid driving or hazardous activity until you know how this medicine will affect you. Your reactions could be impaired. What are the possible side effects of desvenlafaxine? Get emergency medical help if you have signs of an allergic reaction: skin rash or hives; difficulty breathing; swelling of your face, lips, tongue, or throat. Report any new or worsening symptoms to your doctor, such as: mood or behavior changes, anxiety, panic attacks, trouble sleeping, or if you feel impulsive, irritable, agitated, hostile, aggressive, restless, hyperactive (mentally or physically), more depressed, or have thoughts about suicide or hurting yourself. Call your doctor at once if you have:  · a seizure (convulsions);  · easy bruising or bleeding (nosebleeds, bleeding gums), blood in your urine or stools, coughing up blood;  · blurred vision, eye pain or swelling, or seeing halos around lights;  · cough, chest discomfort, trouble breathing; or  · low levels of sodium in the body --headache, confusion, severe weakness, memory problems, feeling unsteady, hallucinations. Seek medical attention right away if you have symptoms of serotonin syndrome, such as: agitation, hallucinations, fever, sweating, shivering, fast heart rate, muscle stiffness, twitching, loss of coordination, nausea, vomiting, or diarrhea.   Common side effects may include:  · dizziness, drowsiness, anxiety;  · increased sweating;  · nausea, decreased appetite, constipation;  · sleep problems (insomnia); or  · decreased sex drive, impotence, or difficulty having an orgasm. This is not a complete list of side effects and others may occur. Call your doctor for medical advice about side effects. You may report side effects to FDA at 1-097-FDA-1721. What other drugs will affect desvenlafaxine? Using desvenlafaxine with other drugs that make you drowsy can worsen this effect. Ask your doctor before using opioid medication, a sleeping pill, a muscle relaxer, or medicine for anxiety or seizures. Many drugs (including some over-the-counter medicines and herbal products) can increase your risk of bleeding if you take them with desvenlafaxine, especially:  · a blood thinner such as warfarin, or other medicine used to prevents blood clots; or  · an NSAID (nonsteroidal anti-inflammatory drug) --aspirin, ibuprofen (Advil, Motrin), naproxen (Aleve), celecoxib, diclofenac, indomethacin, meloxicam, and others. This list is not complete and many other drugs may affect desvenlafaxine. This includes prescription and over-the-counter medicines, vitamins, and herbal products. Not all possible drug interactions are listed here. Where can I get more information? Your pharmacist can provide more information about desvenlafaxine. Remember, keep this and all other medicines out of the reach of children, never share your medicines with others, and use this medication only for the indication prescribed. Every effort has been made to ensure that the information provided by [address] is accurate, up-to-date, and complete, but no guarantee is made to that effect. Drug information contained herein may be time sensitive.  [employer] information has been compiled for use by healthcare practitioners and consumers in the [facility] and therefore [employer] does not warrant that uses outside of the [facility] are appropriate, unless specifically indicated otherwise. [email or web address] drug information does not endorse drugs, diagnose patients or recommend therapy. [email or web address] drug information is an informational resource designed to assist licensed healthcare practitioners in caring for their patients and/or to serve consumers viewing this service as a supplement to, and not a substitute for, the expertise, skill, knowledge and judgment of healthcare practitioners. The absence of a warning for a given drug or drug combination in no way should be construed to indicate that the drug or drug combination is safe, effective or appropriate for any given patient. [facility] does not assume any responsibility for any aspect of healthcare administered with the aid of information [facility] provides. The information contained herein is not intended to cover all possible uses, directions, precautions, warnings, drug interactions, allergic reactions, or adverse effects. If you have questions about the drugs you are taking, check with your doctor, nurse or pharmacist.  Copyright 3675-3246 [address]. Version: 11.01. Revision date: 1/13/2020. Care instructions adapted under license by [facility] ([facility]). If you have questions about a medical condition or this instruction, always ask your healthcare professional. [place] [address] any warranty or liability for your use of this information.

## 2020-09-25 ENCOUNTER — TELEMEDICINE (OUTPATIENT)
Dept: FAMILY MEDICINE CLINIC | Age: 40
End: 2020-09-25
Payer: OTHER GOVERNMENT

## 2020-09-25 PROBLEM — F41.9 ANXIETY: Status: RESOLVED | Noted: 2017-08-30 | Resolved: 2020-09-25

## 2020-09-25 PROCEDURE — 99213 OFFICE O/P EST LOW 20 MIN: CPT | Performed by: INTERNAL MEDICINE

## 2020-09-25 RX ORDER — DESVENLAFAXINE 25 MG/1
25 TABLET, EXTENDED RELEASE ORAL DAILY
Qty: 90 TABLET | Refills: 3 | Status: SHIPPED | OUTPATIENT
Start: 2020-09-25 | End: 2021-08-13

## 2020-09-25 ASSESSMENT — ENCOUNTER SYMPTOMS
EYE DISCHARGE: 0
VOICE CHANGE: 0
COLOR CHANGE: 0
RHINORRHEA: 0
SORE THROAT: 0
EYE PAIN: 0
EYE REDNESS: 0
DIARRHEA: 0
VOMITING: 0
BLOOD IN STOOL: 0
SINUS PRESSURE: 0
ABDOMINAL PAIN: 0
SHORTNESS OF BREATH: 0
COUGH: 0
CHEST TIGHTNESS: 0
WHEEZING: 0

## 2020-09-25 NOTE — PROGRESS NOTES
Skin: Negative for color change and rash. Neurological: Negative for dizziness, tremors, syncope, speech difficulty, weakness, numbness and headaches. Hematological: Negative for adenopathy. Does not bruise/bleed easily. Psychiatric/Behavioral: Negative for confusion, dysphoric mood, self-injury, sleep disturbance and suicidal ideas. The patient is nervous/anxious. See HPI   All other systems reviewed and are negative. Prior to Visit Medications    Medication Sig Taking? Authorizing Provider   desvenlafaxine succinate (PRISTIQ) 25 MG TB24 extended release tablet Take 1 tablet by mouth daily Yes Claude John MD   esomeprazole (NEXIUM 24HR) 20 MG delayed release capsule Take 1 capsule by mouth daily as needed (stomach pain/reflux)  Claude John MD   busPIRone (BUSPAR) 5 MG tablet TAKE 1-2 TABLETS EVERY EIGHT HOURS AS NEEDED ANXIETY AND PANIC GENERIC FOR BUSPAR  Claude John MD       Social History     Tobacco Use    Smoking status: Light Tobacco Smoker    Smokeless tobacco: Never Used    Tobacco comment: 4 cigarettes per day   Substance Use Topics    Alcohol use: Yes     Comment: 2 drinks per week    Drug use: No        No Known Allergies,   Past Medical History:   Diagnosis Date    Adjustment disorder with mixed anxiety and depressed mood     Allergic rhinitis, cause unspecified     Anxiety state, unspecified     Palpitations     Radial styloid tenosynovitis     wrist pain while breast feeding.     Unspecified hypothyroidism    ,   Past Surgical History:   Procedure Laterality Date    WISDOM TOOTH EXTRACTION     ,   Social History     Tobacco Use    Smoking status: Light Tobacco Smoker    Smokeless tobacco: Never Used    Tobacco comment: 4 cigarettes per day   Substance Use Topics    Alcohol use: Yes     Comment: 2 drinks per week    Drug use: No   ,   Family History   Problem Relation Age of Onset    High Blood Pressure Mother     High Blood Pressure Father     Diabetes Father     Stroke Maternal Grandfather     Heart Disease Maternal Grandfather     Colon Polyps Maternal Grandfather         benign adenomatous polyp of large intestine    Diabetes Maternal Grandfather    ,   Immunization History   Administered Date(s) Administered    Tdap (Boostrix, Adacel) 11/19/2010       PHYSICAL EXAMINATION:  [ INSTRUCTIONS:  \"[x]\" Indicates a positive item  \"[]\" Indicates a negative item  -- DELETE ALL ITEMS NOT EXAMINED]  Vital Signs: (As obtained by patient/caregiver or practitioner observation)    Blood pressure-  Heart rate-    Respiratory rate-    Temperature-  Pulse oximetry-     Constitutional: [] Appears well-developed and well-nourished [] No apparent distress      [] Abnormal-   Mental status  [] Alert and awake  [] Oriented to person/place/time []Able to follow commands      Eyes:  EOM    []  Normal  [] Abnormal-  Sclera  []  Normal  [] Abnormal -         Discharge []  None visible  [] Abnormal -    HENT:   [] Normocephalic, atraumatic.   [] Abnormal   [] Mouth/Throat: Mucous membranes are moist.     External Ears [] Normal  [] Abnormal-     Neck: [] No visualized mass     Pulmonary/Chest: [] Respiratory effort normal.  [] No visualized signs of difficulty breathing or respiratory distress        [] Abnormal-      Musculoskeletal:   [] Normal gait with no signs of ataxia         [] Normal range of motion of neck        [] Abnormal-       Neurological:        [] No Facial Asymmetry (Cranial nerve 7 motor function) (limited exam to video visit)          [] No gaze palsy        [] Abnormal-         Skin:        [] No significant exanthematous lesions or discoloration noted on facial skin         [] Abnormal-            Psychiatric:       [] Normal Affect [] No Hallucinations        [] Abnormal-     Other pertinent observable physical exam findings-     Due to this being a TeleHealth encounter, evaluation of the following organ systems is limited: Vitals/Constitutional/EENT/Resp/CV/GI//MS/Neuro/Skin/Heme-Lymph-Imm. ASSESSMENT/PLAN:  Pastor Machado was seen today for anxiety. Diagnoses and all orders for this visit:    PREM (generalized anxiety disorder)  -     desvenlafaxine succinate (PRISTIQ) 25 MG TB24 extended release tablet; Take 1 tablet by mouth daily    Panic disorder without agoraphobia    Annual physical exam  -     CBC Auto Differential; Future  -     Comprehensive Metabolic Panel; Future  -     Lipid Panel; Future  -     TSH without Reflex; Future  -     T4, Free; Future    Vitamin D deficiency  -     Vitamin D 25 Hydroxy; Future    -Generalized anxiety disorder and panic attacks well controlled with Pristiq currently  -Continue BuSpar as needed but monitor for side effects such as worsening of headache when patient takes medication  -Recommend yearly influenza vaccine  -Return in about 3 months (around 12/25/2020) for ECPE, recheck mood. Over 50% of the total visit time of 20 minutes was spent on counseling and/or coordination of care of:   1. PREM (generalized anxiety disorder)    2. Panic disorder without agoraphobia    3. Annual physical exam    4. Vitamin D deficiency          An  electronic signature was used to authenticate this note. --Jana Pope MD on 9/25/2020 at 10:17 AM        Pursuant to the emergency declaration under the [facility] waiver authority and the [employer] Act, this Virtual  Visit was conducted, with patient's consent, to reduce the patient's risk of exposure to COVID-19 and provide continuity of care for an established patient. Services were provided through a video synchronous discussion virtually to substitute for in-person clinic visit.

## 2021-01-19 DIAGNOSIS — Z00.00 ANNUAL PHYSICAL EXAM: ICD-10-CM

## 2021-01-19 DIAGNOSIS — E55.9 VITAMIN D DEFICIENCY: ICD-10-CM

## 2021-01-19 LAB
ALBUMIN SERPL-MCNC: 4.5 G/DL (ref 3.5–5.2)
ALP BLD-CCNC: 68 U/L (ref 35–104)
ALT SERPL-CCNC: 17 U/L (ref 5–33)
ANION GAP SERPL CALCULATED.3IONS-SCNC: 10 MMOL/L (ref 7–19)
AST SERPL-CCNC: 18 U/L (ref 5–32)
BASOPHILS ABSOLUTE: 0.1 K/UL (ref 0–0.2)
BASOPHILS RELATIVE PERCENT: 1.1 % (ref 0–1)
BILIRUB SERPL-MCNC: <0.2 MG/DL (ref 0.2–1.2)
BUN BLDV-MCNC: 10 MG/DL (ref 6–20)
CALCIUM SERPL-MCNC: 9 MG/DL (ref 8.6–10)
CHLORIDE BLD-SCNC: 102 MMOL/L (ref 98–111)
CHOLESTEROL, TOTAL: 222 MG/DL (ref 160–199)
CO2: 26 MMOL/L (ref 22–29)
CREAT SERPL-MCNC: 0.6 MG/DL (ref 0.5–0.9)
EOSINOPHILS ABSOLUTE: 0.2 K/UL (ref 0–0.6)
EOSINOPHILS RELATIVE PERCENT: 2.5 % (ref 0–5)
GFR AFRICAN AMERICAN: >59
GFR NON-AFRICAN AMERICAN: >60
GLUCOSE BLD-MCNC: 97 MG/DL (ref 74–109)
HCT VFR BLD CALC: 42.8 % (ref 37–47)
HDLC SERPL-MCNC: 70 MG/DL (ref 65–121)
HEMOGLOBIN: 14.1 G/DL (ref 12–16)
IMMATURE GRANULOCYTES #: 0 K/UL
LDL CHOLESTEROL CALCULATED: 129 MG/DL
LYMPHOCYTES ABSOLUTE: 2.4 K/UL (ref 1.1–4.5)
LYMPHOCYTES RELATIVE PERCENT: 38.4 % (ref 20–40)
MCH RBC QN AUTO: 32.3 PG (ref 27–31)
MCHC RBC AUTO-ENTMCNC: 32.9 G/DL (ref 33–37)
MCV RBC AUTO: 97.9 FL (ref 81–99)
MONOCYTES ABSOLUTE: 0.5 K/UL (ref 0–0.9)
MONOCYTES RELATIVE PERCENT: 8.3 % (ref 0–10)
NEUTROPHILS ABSOLUTE: 3.1 K/UL (ref 1.5–7.5)
NEUTROPHILS RELATIVE PERCENT: 49.5 % (ref 50–65)
PDW BLD-RTO: 12.1 % (ref 11.5–14.5)
PLATELET # BLD: 328 K/UL (ref 130–400)
PMV BLD AUTO: 9.7 FL (ref 9.4–12.3)
POTASSIUM SERPL-SCNC: 4.1 MMOL/L (ref 3.5–5)
RBC # BLD: 4.37 M/UL (ref 4.2–5.4)
SODIUM BLD-SCNC: 138 MMOL/L (ref 136–145)
T4 FREE: 0.97 NG/DL (ref 0.93–1.7)
TOTAL PROTEIN: 7.3 G/DL (ref 6.6–8.7)
TRIGL SERPL-MCNC: 113 MG/DL (ref 0–149)
TSH SERPL DL<=0.05 MIU/L-ACNC: 6.22 UIU/ML (ref 0.27–4.2)
VITAMIN D 25-HYDROXY: 23.1 NG/ML
WBC # BLD: 6.3 K/UL (ref 4.8–10.8)

## 2021-01-20 ENCOUNTER — OFFICE VISIT (OUTPATIENT)
Dept: FAMILY MEDICINE CLINIC | Age: 41
End: 2021-01-20
Payer: OTHER GOVERNMENT

## 2021-01-20 VITALS
BODY MASS INDEX: 28.16 KG/M2 | OXYGEN SATURATION: 99 % | TEMPERATURE: 96.9 F | HEART RATE: 86 BPM | WEIGHT: 175.25 LBS | SYSTOLIC BLOOD PRESSURE: 120 MMHG | DIASTOLIC BLOOD PRESSURE: 68 MMHG | HEIGHT: 66 IN

## 2021-01-20 DIAGNOSIS — F17.200 MILD TOBACCO ABUSE: ICD-10-CM

## 2021-01-20 DIAGNOSIS — E55.9 VITAMIN D DEFICIENCY: ICD-10-CM

## 2021-01-20 DIAGNOSIS — E66.3 OVERWEIGHT (BMI 25.0-29.9): ICD-10-CM

## 2021-01-20 DIAGNOSIS — Z00.00 ANNUAL PHYSICAL EXAM: Primary | ICD-10-CM

## 2021-01-20 DIAGNOSIS — E03.9 ACQUIRED HYPOTHYROIDISM: ICD-10-CM

## 2021-01-20 DIAGNOSIS — F41.1 GAD (GENERALIZED ANXIETY DISORDER): ICD-10-CM

## 2021-01-20 PROCEDURE — 99214 OFFICE O/P EST MOD 30 MIN: CPT | Performed by: INTERNAL MEDICINE

## 2021-01-20 RX ORDER — LEVOTHYROXINE SODIUM 0.05 MG/1
50 TABLET ORAL DAILY
Qty: 30 TABLET | Refills: 2 | Status: SHIPPED | OUTPATIENT
Start: 2021-01-20 | End: 2021-03-08 | Stop reason: SDUPTHER

## 2021-01-20 RX ORDER — CHOLECALCIFEROL (VITAMIN D3) 50 MCG
2000 TABLET ORAL DAILY
Qty: 90 TABLET | Refills: 3 | Status: SHIPPED | OUTPATIENT
Start: 2021-01-20 | End: 2022-08-17

## 2021-01-20 ASSESSMENT — VISUAL ACUITY: OU: 1

## 2021-01-20 ASSESSMENT — PATIENT HEALTH QUESTIONNAIRE - PHQ9
1. LITTLE INTEREST OR PLEASURE IN DOING THINGS: 0
SUM OF ALL RESPONSES TO PHQ9 QUESTIONS 1 & 2: 0

## 2021-01-20 ASSESSMENT — ENCOUNTER SYMPTOMS
EYE DISCHARGE: 0
EYE PAIN: 0
VOICE CHANGE: 0
VOMITING: 0
SINUS PRESSURE: 0
SHORTNESS OF BREATH: 0
SORE THROAT: 0
BLOOD IN STOOL: 0
COLOR CHANGE: 0
COUGH: 0
EYE REDNESS: 0
RHINORRHEA: 0
CHEST TIGHTNESS: 0
WHEEZING: 0
ABDOMINAL PAIN: 0
DIARRHEA: 0

## 2021-01-20 NOTE — PATIENT INSTRUCTIONS
exposed skin. · See a dentist one or two times a year for checkups and to have your teeth cleaned. · Wear a seat belt in the car. Follow your doctor's advice about when to have certain tests. These tests can spot problems early. For everyone  · Cholesterol. Have the fat (cholesterol) in your blood tested after age 21. Your doctor will tell you how often to have this done based on your age, family history, or other things that can increase your risk for heart disease. · Blood pressure. Have your blood pressure checked during a routine doctor visit. Your doctor will tell you how often to check your blood pressure based on your age, your blood pressure results, and other factors. · Vision. Talk with your doctor about how often to have a glaucoma test.  · Diabetes. Ask your doctor whether you should have tests for diabetes. · Colon cancer. Your risk for colorectal cancer gets higher as you get older. Some experts say that adults should start regular screening at age 48 and stop at age 76. Others say to start before age 48 or continue after age 76. Talk with your doctor about your risk and when to start and stop screening. For women  · Breast exam and mammogram. Talk to your doctor about when you should have a clinical breast exam and a mammogram. Medical experts differ on whether and how often women under 50 should have these tests. Your doctor can help you decide what is right for you. · Cervical cancer screening test and pelvic exam. Begin with a Pap test at age 24. The test often is part of a pelvic exam. Starting at age 27, you may choose to have a Pap test, an HPV test, or both tests at the same time (called co-testing). Talk with your doctor about how often to have testing. · Tests for sexually transmitted infections (STIs). Ask whether you should have tests for STIs. You may be at risk if you have sex with more than one person, especially if your partners do not wear condoms.   For men  · Tests for sexually transmitted infections (STIs). Ask whether you should have tests for STIs. You may be at risk if you have sex with more than one person, especially if you do not wear a condom. · Testicular cancer exam. Ask your doctor whether you should check your testicles regularly. · Prostate exam. Talk to your doctor about whether you should have a blood test (called a PSA test) for prostate cancer. Experts differ on whether and when men should have this test. Some experts suggest it if you are older than 39 and are -American or have a father or brother who got prostate cancer when he was younger than 72. When should you call for help? Watch closely for changes in your health, and be sure to contact your doctor if you have any problems or symptoms that concern you. Where can you learn more? Go to https://Reverse MedicalpeConnestaeweb.healthFix That Bug. org and sign in to your LoveThatFit account. Enter P072 in the [employer] box to learn more about \"Well Visit, Ages 25 to 48: Care Instructions. \"     If you do not have an account, please click on the \"Sign Up Now\" link. Current as of: May 27, 2020               Content Version: 12.6  © 6185-0928 [employer]. Care instructions adapted under license by [address] If you have questions about a medical condition or this instruction, always ask your healthcare professional. [place] [address] any warranty or liability for your use of this information. Patient Education        Hypothyroidism: Care Instructions  Your Care Instructions     When you have hypothyroidism, your body doesn't make enough thyroid hormone. This hormone helps your body use energy. If your thyroid level is low, you may feel tired, be constipated, have an increase in your blood pressure, or have dry skin or memory problems. You may also get cold easily, even when it is warm. Women with low thyroid levels may have heavy menstrual periods.   A blood test to find your thyroid-stimulating hormone (TSH) level is used to check for hypothyroidism. A high TSH level may mean that you have it. The treatment for hypothyroidism is thyroid hormone pills. You should start to feel better in 1 to 2 weeks. Most people need treatment for the rest of their lives. You will need regular visits with your doctor to make sure you are doing well and that you have the right dose of medicine. Follow-up care is a key part of your treatment and safety. Be sure to make and go to all appointments, and call your doctor if you are having problems. It's also a good idea to know your test results and keep a list of the medicines you take. How can you care for yourself at home? · Take your thyroid hormone medicine exactly as prescribed. Call your doctor if you think you are having a problem with your medicine. Most people do not have side effects if they take the right amount of medicine regularly. ? Take the medicine 30 minutes before breakfast, and do not take it with calcium, vitamins, or iron. ? Do not take extra doses of your thyroid medicine. It will not help you get better any faster, and it may cause side effects. ? If you forget to take a dose, do NOT take a double dose of medicine. Take your usual dose the next day. · Tell your doctor about all prescription, herbal, or over-the-counter products you take. · Take care of yourself. Eat a healthy diet, get enough sleep, and get regular exercise. When should you call for help? Call 911 anytime you think you may need emergency care. For example, call if:    · You passed out (lost consciousness).     · You have severe trouble breathing.     · You have a very slow heartbeat (less than 60 beats a minute).     · You have a low body temperature (95°F or below).    Call your doctor now or seek immediate medical care if:    · You feel tired, sluggish, or weak.     · You have trouble remembering things or concentrating.     · You do not begin to feel better 2 weeks after starting your medicine. Watch closely for changes in your health, and be sure to contact your doctor if you have any problems. Where can you learn more? Go to https://JelasticpepicewYaData.SocialMart. org and sign in to your [employer] account. Enter D384 in the [employer] box to learn more about \"Hypothyroidism: Care Instructions. \"     If you do not have an account, please click on the \"Sign Up Now\" link. Current as of: March 31, 2020               Content Version: 12.6  © 4636-8246 [employer]. Care instructions adapted under license by [facility] [place] ([facility]). If you have questions about a medical condition or this instruction, always ask your healthcare professional. [address] any warranty or liability for your use of this information. Patient Education        Starting a Weight Loss Plan: Care Instructions  Your Care Instructions     If you are thinking about losing weight, it can be hard to know where to start. Your doctor can help you set up a weight loss plan that best meets your needs. You may want to take a class on nutrition or exercise, or join a weight loss support group. If you have questions about how to make changes to your eating or exercise habits, ask your doctor about seeing a registered dietitian or an exercise specialist.  It can be a big challenge to lose weight. But you do not have to make huge changes at once. Make small changes, and stick with them. When those changes become habit, add a few more changes. If you do not think you are ready to make changes right now, try to pick a date in the future. Make an appointment to see your doctor to discuss whether the time is right for you to start a plan. Follow-up care is a key part of your treatment and safety. Be sure to make and go to all appointments, and call your doctor if you are having problems.  It's also a good idea to know your test results and keep a list of the medicines you take. How can you care for yourself at home? · Set realistic goals. Many people expect to lose much more weight than is likely. A weight loss of 5% to 10% of your body weight may be enough to improve your health. · Get family and friends involved to provide support. Talk to them about why you are trying to lose weight, and ask them to help. They can help by participating in exercise and having meals with you, even if they may be eating something different. · Find what works best for you. If you do not have time or do not like to cook, a program that offers meal replacement bars or shakes may be better for you. Or if you like to prepare meals, finding a plan that includes daily menus and recipes may be best.  · Ask your doctor about other health professionals who can help you achieve your weight loss goals. ? A dietitian can help you make healthy changes in your diet. ? An exercise specialist or  can help you develop a safe and effective exercise program.  ? A counselor or psychiatrist can help you cope with issues such as depression, anxiety, or family problems that can make it hard to focus on weight loss. · Consider joining a support group for people who are trying to lose weight. Your doctor can suggest groups in your area. Where can you learn more? Go to https://Sourcebits.RocketOz. org and sign in to your Kidzillions account. Enter N006 in the [facility] box to learn more about \"Starting a Weight Loss Plan: Care Instructions. \"     If you do not have an account, please click on the \"Sign Up Now\" link. Current as of: December 11, 2019               Content Version: 12.6  © 4610-2428 [employer]. Care instructions adapted under license by [facility] [place] ([facility]).  If you have questions about a medical condition or this instruction, always ask your healthcare professional. [address] any warranty or liability for your use of this information. Patient Education        Learning About Benefits From Quitting Smoking  How does quitting smoking make you healthier? If you're thinking about quitting smoking, you may have a few reasons to be smoke-free. Your health may be one of them. · When you quit smoking, you lower your risks for cancer, lung disease, heart attack, stroke, blood vessel disease, and blindness from macular degeneration. · When you're smoke-free, you get sick less often, and you heal faster. You are less likely to get colds, flu, bronchitis, and pneumonia. · As a nonsmoker, you may find that your mood is better and you are less stressed. When and how will you feel healthier? Quitting has real health benefits that start from day 1 of being smoke-free. And the longer you stay smoke-free, the healthier you get and the better you feel. The first hours  · After just 20 minutes, your blood pressure and heart rate go down. That means there's less stress on your heart and blood vessels. · Within 12 hours, the level of carbon monoxide in your blood drops back to normal. That makes room for more oxygen. With more oxygen in your body, you may notice that you have more energy than when you smoked. After 2 weeks  · Your lungs start to work better. · Your risk of heart attack starts to drop. After 1 month  · When your lungs are clear, you cough less and breathe deeper, so it's easier to be active. · Your sense of taste and smell return. That means you can enjoy food more than you have since you started smoking. Over the years  · Over the years, your risks of heart disease, heart attack, and stroke are lower. · After 10 years, your risk of dying from lung cancer is cut by about half. And your risk for many other types of cancer is lower too. How would quitting help others in your life? When you quit smoking, you improve the health of everyone who now breathes in your smoke.   · Their heart, lung, and cancer risks drop, much like yours. · They are sick less. For babies and small children, living smoke-free means they're less likely to have ear infections, pneumonia, and bronchitis. · If you're a woman who is or will be pregnant someday, quitting smoking means a healthier . · Children who are close to you are less likely to become adult smokers. Where can you learn more? Go to https://RidleypeCody.DialedIN. org and sign in to your [employer] account. Enter [id number] in the [employer] box to learn more about \"Learning About Benefits From Quitting Smoking. \"     If you do not have an account, please click on the \"Sign Up Now\" link. Current as of: 2020               Content Version: 12.6  © 8674-7535 [employer]. Care instructions adapted under license by [facility] ([facility]). If you have questions about a medical condition or this instruction, always ask your healthcare professional. [place] [address] any warranty or liability for your use of this information.

## 2021-01-20 NOTE — PROGRESS NOTES
Trina Gould is a 45 y.o. female who presents today for   Chief Complaint   Patient presents with    Annual Exam       HPI  44 y/o WF here for annual wellness and follow up on PREM. Her TSH is more elevated on recent lab work and she has had fatigue, cold intolerance, and weight gain for at least 6-12 mths which has not worsened but has not improved. Patient would like to start thyroid hormone replacement if indicated. She feels like her PREM is well controlled with pristiq 25 mg daily. She denies side effects from medication. She has not needed the buspirinone for anxiety recently since starting pristiq. She is followed by Ob/Gyn for routine pap smears and breast exam. Last pap smear was with [id number] [facility] on 10/17/2017 which was normal on review. Initial screening mammogram was on 11/15/2016 which was normal.      BMI Readings from Last 3 Encounters:   01/20/21 28.29 kg/m²   11/19/19 26.28 kg/m²   11/16/18 27.08 kg/m²     Wt Readings from Last 3 Encounters:   01/20/21 175 lb 4 oz (79.5 kg)   11/19/19 162 lb 12.8 oz (73.8 kg)   11/16/18 167 lb 12.8 oz (76.1 kg)         Review of Systems   Constitutional: Positive for fatigue and unexpected weight change. Negative for appetite change, chills and fever. HENT: Negative for ear pain, rhinorrhea, sinus pressure, sore throat and voice change. Eyes: Negative for pain, discharge and redness. Respiratory: Negative for cough, chest tightness, shortness of breath and wheezing. Cardiovascular: Negative for chest pain and palpitations. Gastrointestinal: Negative for abdominal pain, blood in stool, diarrhea and vomiting. Endocrine: Positive for cold intolerance. Negative for heat intolerance and polydipsia. Genitourinary: Negative for dysuria and hematuria. Musculoskeletal: Negative for arthralgias, myalgias, neck pain and neck stiffness. Skin: Negative for color change and rash.    Neurological: Negative for dizziness, tremors, syncope, speech difficulty, weakness, numbness and headaches. Hematological: Negative for adenopathy. Does not bruise/bleed easily. Psychiatric/Behavioral: Negative for confusion, dysphoric mood and sleep disturbance. The patient is not nervous/anxious. All other systems reviewed and are negative. Past Medical History:   Diagnosis Date    Adjustment disorder with mixed anxiety and depressed mood     Allergic rhinitis, cause unspecified     Anxiety state, unspecified     Palpitations     Paroxysmal [facility]ra[facility] 9/3/2014    Radial styloid tenosynovitis     wrist pain while breast feeding.  Unspecified hypothyroidism        Current Outpatient Medications   Medication Sig Dispense Refill    levothyroxine (SYNTHROID) 50 MCG tablet Take 1 tablet by mouth daily 30 tablet 2    vitamin D (CHOLECALCIFEROL) 50 MCG (2000 UT) TABS tablet Take 1 tablet by mouth daily 90 tablet 3    desvenlafaxine succinate (PRISTIQ) 25 MG TB24 extended release tablet Take 1 tablet by mouth daily 90 tablet 3    esomeprazole (NEXIUM 24HR) 20 MG delayed release capsule Take 1 capsule by mouth daily as needed (stomach pain/reflux) 30 capsule 3    busPIRone (BUSPAR) 5 MG tablet TAKE 1-2 TABLETS EVERY EIGHT HOURS AS NEEDED ANXIETY AND PANIC GENERIC FOR BUSPAR 60 tablet 3     No current facility-administered medications for this visit.         No Known Allergies    Past Surgical History:   Procedure Laterality Date    WISDOM TOOTH EXTRACTION         Social History     Tobacco Use    Smoking status: Light Tobacco Smoker     Packs/day: 0.25     Years: 20.00     Pack years: 5.00     Types: Cigarettes    Smokeless tobacco: Never Used    Tobacco comment: 4 cigarettes per day   Substance Use Topics    Alcohol use: Yes     Comment: 2 drinks per week    Drug use: No       Family History   Problem Relation Age of Onset    High Blood Pressure Mother     Cancer Mother     High Blood Pressure Father     Diabetes Father  Stroke Maternal Grandfather     Heart Disease Maternal Grandfather     Colon Polyps Maternal Grandfather         benign adenomatous polyp of large intestine    Diabetes Maternal Grandfather        /68   Pulse 86   Temp 96.9 °F (36.1 °C)   Ht 5' 6\" (1.676 m)   Wt 175 lb 4 oz (79.5 kg)   SpO2 99%   BMI 28.29 kg/m²     Physical Exam  Vitals signs and nursing note reviewed. Constitutional:       General: She is not in acute distress. Appearance: Normal appearance. She is well-developed, well-groomed and overweight. She is not ill-appearing, toxic-appearing or diaphoretic. HENT:      Head: Normocephalic and atraumatic. Right Ear: Tympanic membrane, ear canal and external ear normal.      Left Ear: Tympanic membrane, ear canal and external ear normal.      Nose: Nose normal.      Mouth/Throat:      Lips: Pink. Mouth: Mucous membranes are moist. No oral lesions. Tongue: No lesions. Palate: No mass and lesions. Pharynx: Oropharynx is clear. Uvula midline. No pharyngeal swelling, oropharyngeal exudate, posterior oropharyngeal erythema or uvula swelling. Tonsils: 1+ on the right. 1+ on the left. Eyes:      General: Lids are normal. Vision grossly intact. No scleral icterus. Extraocular Movements: Extraocular movements intact. Conjunctiva/sclera: Conjunctivae normal.      Pupils: Pupils are equal, round, and reactive to light. Neck:      Musculoskeletal: Normal range of motion and neck supple. Thyroid: No thyroid mass or thyromegaly. Vascular: No carotid bruit or JVD. Trachea: Trachea and phonation normal.   Cardiovascular:      Rate and Rhythm: Normal rate and regular rhythm. Pulses: Normal pulses. Radial pulses are 2+ on the right side and 2+ on the left side. Posterior tibial pulses are 2+ on the right side and 2+ on the left side. Heart sounds: Normal heart sounds. No murmur. No friction rub. No gallop. Pulmonary:      Effort: Pulmonary effort is normal. No accessory muscle usage. Breath sounds: Normal breath sounds. No decreased breath sounds, wheezing, rhonchi or rales. Abdominal:      General: Abdomen is flat. Bowel sounds are normal. There is no distension. Palpations: Abdomen is soft. There is no hepatomegaly, splenomegaly or mass. Tenderness: There is no abdominal tenderness. There is no guarding or rebound. Hernia: No hernia is present. Genitourinary:     Comments: deferred  Musculoskeletal: Normal range of motion. Right wrist: Normal.      Left wrist: Normal.      Right ankle: Normal.      Left ankle: Normal.      Right lower leg: No edema. Left lower leg: No edema. Lymphadenopathy:      Head:      Right side of head: No submandibular adenopathy. Left side of head: No submandibular adenopathy. Cervical: No cervical adenopathy. Right cervical: No superficial, deep or posterior cervical adenopathy. Left cervical: No superficial, deep or posterior cervical adenopathy. Upper Body:      Right upper body: No supraclavicular adenopathy. Left upper body: No supraclavicular adenopathy. Lower Body: No right inguinal adenopathy. No left inguinal adenopathy. Skin:     General: Skin is warm and dry. Capillary Refill: Capillary refill takes less than 2 seconds. Coloration: Skin is not cyanotic. Findings: No rash. Nails: There is no clubbing. Neurological:      Mental Status: She is alert and oriented to person, place, and time. Cranial Nerves: No cranial nerve deficit, dysarthria or facial asymmetry. Sensory: Sensation is intact. Motor: Motor function is intact. No weakness, tremor, atrophy or abnormal muscle tone. Coordination: Coordination is intact. Romberg sign negative. Coordination normal.      Gait: Gait is intact. Deep Tendon Reflexes: Reflexes are normal and symmetric.       Reflex Scores: Brachioradialis reflexes are 2+ on the right side and 2+ on the left side. Patellar reflexes are 2+ on the right side and 2+ on the left side. Comments: CN II-XII grossly intact, speech clear, MAEW, no focal deficits   Psychiatric:         Attention and Perception: Attention and perception normal.         Mood and Affect: Mood and affect normal.         Speech: Speech normal.         Behavior: Behavior normal. Behavior is cooperative. Thought Content:  Thought content normal.         Cognition and Memory: Cognition and memory normal.         Judgment: Judgment normal.         Lab Results   Component Value Date    WBC 6.3 01/19/2021    HGB 14.1 01/19/2021    HCT 42.8 01/19/2021    MCV 97.9 01/19/2021     01/19/2021    LYMPHOPCT 38.4 01/19/2021    RBC 4.37 01/19/2021    MCH 32.3 (H) 01/19/2021    MCHC 32.9 (L) 01/19/2021    RDW 12.1 01/19/2021     Lab Results   Component Value Date     01/19/2021    K 4.1 01/19/2021     01/19/2021    CO2 26 01/19/2021    BUN 10 01/19/2021    CREATININE 0.6 01/19/2021    GLUCOSE 97 01/19/2021    CALCIUM 9.0 01/19/2021    PROT 7.3 01/19/2021    LABALBU 4.5 01/19/2021    BILITOT <0.2 01/19/2021    ALKPHOS 68 01/19/2021    AST 18 01/19/2021    ALT 17 01/19/2021    LABGLOM >60 01/19/2021    GFRAA >59 01/19/2021    GLOB 2.5 12/08/2016       Lab Results   Component Value Date    TSH 6.220 (H) 01/19/2021    T4FREE 0.97 01/19/2021     Lab Results   Component Value Date    CHOL 222 (H) 01/19/2021    CHOL 210 (H) 11/19/2019    CHOL 171 01/29/2018     Lab Results   Component Value Date    TRIG 113 01/19/2021    TRIG 91 11/19/2019    TRIG 63 01/29/2018     Lab Results   Component Value Date    HDL 70 01/19/2021    HDL 63 (L) 11/19/2019    HDL 69 01/29/2018     Lab Results   Component Value Date    LDLCALC 129 01/19/2021    LDLCALC 129 11/19/2019    LDLCALC 89 01/29/2018     Lab Results   Component Value Date    VITD25 23.1 (L) 01/19/2021         No results abuse    6. Overweight (BMI 25.0-29. 9)         Orders Placed This Encounter   Procedures    TSH without Reflex     Standing Status:   Future     Standing Expiration Date:   2/1/2022    T4, Free     Standing Status:   Future     Standing Expiration Date:   2/1/2022     Orders Placed This Encounter   Medications    levothyroxine (SYNTHROID) 50 MCG tablet     Sig: Take 1 tablet by mouth daily     Dispense:  30 tablet     Refill:  2    vitamin D (CHOLECALCIFEROL) 50 MCG (2000 UT) TABS tablet     Sig: Take 1 tablet by mouth daily     Dispense:  90 tablet     Refill:  3     There are no discontinued medications. Patient Instructions       Patient Education        Well Visit, Ages 25 to 48: Care Instructions  Your Care Instructions     Physical exams can help you stay healthy. Your doctor has checked your overall health and may have suggested ways to take good care of yourself. He or she also may have recommended tests. At home, you can help prevent illness with healthy eating, regular exercise, and other steps. Follow-up care is a key part of your treatment and safety. Be sure to make and go to all appointments, and call your doctor if you are having problems. It's also a good idea to know your test results and keep a list of the medicines you take. How can you care for yourself at home? · Reach and stay at a healthy weight. This will lower your risk for many problems, such as obesity, diabetes, heart disease, and high blood pressure. · Get at least 30 minutes of physical activity on most days of the week. Walking is a good choice. You also may want to do other activities, such as running, swimming, cycling, or playing tennis or team sports. Discuss any changes in your exercise program with your doctor. · Do not smoke or allow others to smoke around you. If you need help quitting, talk to your doctor about stop-smoking programs and medicines. These can increase your chances of quitting for good.   · Talk to your doctor about whether you have any risk factors for sexually transmitted infections (STIs). Having one sex partner (who does not have STIs and does not have sex with anyone else) is a good way to avoid these infections. · Use birth control if you do not want to have children at this time. Talk with your doctor about the choices available and what might be best for you. · Protect your skin from too much sun. When you're outdoors from 10 a.m. to 4 p.m., stay in the shade or cover up with clothing and a hat with a wide brim. Wear sunglasses that block UV rays. Even when it's cloudy, put broad-spectrum sunscreen (SPF 30 or higher) on any exposed skin. · See a dentist one or two times a year for checkups and to have your teeth cleaned. · Wear a seat belt in the car. Follow your doctor's advice about when to have certain tests. These tests can spot problems early. For everyone  · Cholesterol. Have the fat (cholesterol) in your blood tested after age 21. Your doctor will tell you how often to have this done based on your age, family history, or other things that can increase your risk for heart disease. · Blood pressure. Have your blood pressure checked during a routine doctor visit. Your doctor will tell you how often to check your blood pressure based on your age, your blood pressure results, and other factors. · Vision. Talk with your doctor about how often to have a glaucoma test.  · Diabetes. Ask your doctor whether you should have tests for diabetes. · Colon cancer. Your risk for colorectal cancer gets higher as you get older. Some experts say that adults should start regular screening at age 48 and stop at age 76. Others say to start before age 48 or continue after age 76. Talk with your doctor about your risk and when to start and stop screening.   For women  · Breast exam and mammogram. Talk to your doctor about when you should have a clinical breast exam and a mammogram. Medical experts differ on whether and how often women under 50 should have these tests. Your doctor can help you decide what is right for you. · Cervical cancer screening test and pelvic exam. Begin with a Pap test at age 24. The test often is part of a pelvic exam. Starting at age 27, you may choose to have a Pap test, an HPV test, or both tests at the same time (called co-testing). Talk with your doctor about how often to have testing. · Tests for sexually transmitted infections (STIs). Ask whether you should have tests for STIs. You may be at risk if you have sex with more than one person, especially if your partners do not wear condoms. For men  · Tests for sexually transmitted infections (STIs). Ask whether you should have tests for STIs. You may be at risk if you have sex with more than one person, especially if you do not wear a condom. · Testicular cancer exam. Ask your doctor whether you should check your testicles regularly. · Prostate exam. Talk to your doctor about whether you should have a blood test (called a PSA test) for prostate cancer. Experts differ on whether and when men should have this test. Some experts suggest it if you are older than 39 and are -American or have a father or brother who got prostate cancer when he was younger than 72. When should you call for help? Watch closely for changes in your health, and be sure to contact your doctor if you have any problems or symptoms that concern you. Where can you learn more? Go to https://CYPHERjanny.healthAdvanced Telemetry. org and sign in to your [employer] account. Enter P072 in the [facility] box to learn more about \"Well Visit, Ages 25 to 48: Care Instructions. \"     If you do not have an account, please click on the \"Sign Up Now\" link. Current as of: May 27, 2020               Content Version: 12.6  © 7523-5237 [employer]. Care instructions adapted under license by [facility] ([facility]).  If you have questions about a medical condition or this instruction, always ask your healthcare professional. [address] any warranty or liability for your use of this information. Patient Education        Hypothyroidism: Care Instructions  Your Care Instructions     When you have hypothyroidism, your body doesn't make enough thyroid hormone. This hormone helps your body use energy. If your thyroid level is low, you may feel tired, be constipated, have an increase in your blood pressure, or have dry skin or memory problems. You may also get cold easily, even when it is warm. Women with low thyroid levels may have heavy menstrual periods. A blood test to find your thyroid-stimulating hormone (TSH) level is used to check for hypothyroidism. A high TSH level may mean that you have it. The treatment for hypothyroidism is thyroid hormone pills. You should start to feel better in 1 to 2 weeks. Most people need treatment for the rest of their lives. You will need regular visits with your doctor to make sure you are doing well and that you have the right dose of medicine. Follow-up care is a key part of your treatment and safety. Be sure to make and go to all appointments, and call your doctor if you are having problems. It's also a good idea to know your test results and keep a list of the medicines you take. How can you care for yourself at home? · Take your thyroid hormone medicine exactly as prescribed. Call your doctor if you think you are having a problem with your medicine. Most people do not have side effects if they take the right amount of medicine regularly. ? Take the medicine 30 minutes before breakfast, and do not take it with calcium, vitamins, or iron. ? Do not take extra doses of your thyroid medicine. It will not help you get better any faster, and it may cause side effects. ? If you forget to take a dose, do NOT take a double dose of medicine. Take your usual dose the next day.   · Tell your doctor about all prescription, herbal, or over-the-counter products you take. · Take care of yourself. Eat a healthy diet, get enough sleep, and get regular exercise. When should you call for help? Call 911 anytime you think you may need emergency care. For example, call if:    · You passed out (lost consciousness).     · You have severe trouble breathing.     · You have a very slow heartbeat (less than 60 beats a minute).     · You have a low body temperature (95°F or below). Call your doctor now or seek immediate medical care if:    · You feel tired, sluggish, or weak.     · You have trouble remembering things or concentrating.     · You do not begin to feel better 2 weeks after starting your medicine. Watch closely for changes in your health, and be sure to contact your doctor if you have any problems. Where can you learn more? Go to https://Heath Robinson Museumpeelanaewjosiah.Nextance. org and sign in to your [employer] account. Enter F619 in the [employer] ([employer]) box to learn more about \"Hypothyroidism: Care Instructions. \"     If you do not have an account, please click on the \"Sign Up Now\" link. Current as of: March 31, 2020               Content Version: 12.6  © 2316-8941 "[employer]. Care instructions adapted under license by [facility] ([facility]). If you have questions about a medical condition or this instruction, always ask your healthcare professional. [place] [address] any warranty or liability for your use of this information. Patient Education        Starting a Weight Loss Plan: Care Instructions  Your Care Instructions     If you are thinking about losing weight, it can be hard to know where to start. Your doctor can help you set up a weight loss plan that best meets your needs. You may want to take a class on nutrition or exercise, or join a weight loss support group.  If you have questions about how to make changes to your eating or exercise habits, ask your doctor about seeing a registered dietitian or an exercise specialist.  It can be a big challenge to lose weight. But you do not have to make huge changes at once. Make small changes, and stick with them. When those changes become habit, add a few more changes. If you do not think you are ready to make changes right now, try to pick a date in the future. Make an appointment to see your doctor to discuss whether the time is right for you to start a plan. Follow-up care is a key part of your treatment and safety. Be sure to make and go to all appointments, and call your doctor if you are having problems. It's also a good idea to know your test results and keep a list of the medicines you take. How can you care for yourself at home? · Set realistic goals. Many people expect to lose much more weight than is likely. A weight loss of 5% to 10% of your body weight may be enough to improve your health. · Get family and friends involved to provide support. Talk to them about why you are trying to lose weight, and ask them to help. They can help by participating in exercise and having meals with you, even if they may be eating something different. · Find what works best for you. If you do not have time or do not like to cook, a program that offers meal replacement bars or shakes may be better for you. Or if you like to prepare meals, finding a plan that includes daily menus and recipes may be best.  · Ask your doctor about other health professionals who can help you achieve your weight loss goals. ? A dietitian can help you make healthy changes in your diet. ? An exercise specialist or  can help you develop a safe and effective exercise program.  ? A counselor or psychiatrist can help you cope with issues such as depression, anxiety, or family problems that can make it hard to focus on weight loss. · Consider joining a support group for people who are trying to lose weight. Your doctor can suggest groups in your area.   Where can you learn more?  Go to https://chpepiceweb.[email or web address] org and sign in to your [employer] account. Enter A764 in the [facility] box to learn more about \"Starting a Weight Loss Plan: Care Instructions. \"     If you do not have an account, please click on the \"Sign Up Now\" link. Current as of: December 11, 2019               Content Version: 12.6  © 8444-1846 [employer]. Care instructions adapted under license by [facility] ([facility]). If you have questions about a medical condition or this instruction, always ask your healthcare professional. [address] any warranty or liability for your use of this information. Patient Education        Learning About Benefits From Quitting Smoking  How does quitting smoking make you healthier? If you're thinking about quitting smoking, you may have a few reasons to be smoke-free. Your health may be one of them. · When you quit smoking, you lower your risks for cancer, lung disease, heart attack, stroke, blood vessel disease, and blindness from macular degeneration. · When you're smoke-free, you get sick less often, and you heal faster. You are less likely to get colds, flu, bronchitis, and pneumonia. · As a nonsmoker, you may find that your mood is better and you are less stressed. When and how will you feel healthier? Quitting has real health benefits that start from day 1 of being smoke-free. And the longer you stay smoke-free, the healthier you get and the better you feel. The first hours  · After just 20 minutes, your blood pressure and heart rate go down. That means there's less stress on your heart and blood vessels. · Within 12 hours, the level of carbon monoxide in your blood drops back to normal. That makes room for more oxygen. With more oxygen in your body, you may notice that you have more energy than when you smoked. After 2 weeks  · Your lungs start to work better.   · Your risk of heart attack starts to drop.  After 1 month  · When your lungs are clear, you cough less and breathe deeper, so it's easier to be active. · Your sense of taste and smell return. That means you can enjoy food more than you have since you started smoking. Over the years  · Over the years, your risks of heart disease, heart attack, and stroke are lower. · After 10 years, your risk of dying from lung cancer is cut by about half. And your risk for many other types of cancer is lower too. How would quitting help others in your life? When you quit smoking, you improve the health of everyone who now breathes in your smoke. · Their heart, lung, and cancer risks drop, much like yours. · They are sick less. For babies and small children, living smoke-free means they're less likely to have ear infections, pneumonia, and bronchitis. · If you're a woman who is or will be pregnant someday, quitting smoking means a healthier . · Children who are close to you are less likely to become adult smokers. Where can you learn more? Go to https://Youbei Game.CiDRA. org and sign in to your [employer] account. Enter [phone] in the [facility] box to learn more about \"Learning About Benefits From Quitting Smoking. \"     If you do not have an account, please click on the \"Sign Up Now\" link. Current as of: 2020               Content Version: 12.6  © 2920-7269 [employer]. Care instructions adapted under license by [facility] ([facility]). If you have questions about a medical condition or this instruction, always ask your healthcare professional. [place] [address] any warranty or liability for your use of this information. Patient voices understanding and agrees to plans along with risks and benefits of plan. Counseling:  Dagmar Ruiz's case, medications and options were discussed in detail. patient was instructed to call the office if she   questions regarding her treatment.  Should her conditions worsen, she should return to office to be reassessed by Dr. Juanita Salazar. she  Should to go the closest Emergency Department for any emergency. They verbalized understanding the above instructions.

## 2021-03-03 DIAGNOSIS — E03.9 ACQUIRED HYPOTHYROIDISM: ICD-10-CM

## 2021-03-03 LAB
T4 FREE: 1.21 NG/DL (ref 0.93–1.7)
TSH SERPL DL<=0.05 MIU/L-ACNC: 2.76 UIU/ML (ref 0.27–4.2)

## 2021-03-08 ENCOUNTER — TELEMEDICINE (OUTPATIENT)
Dept: FAMILY MEDICINE CLINIC | Age: 41
End: 2021-03-08
Payer: OTHER GOVERNMENT

## 2021-03-08 DIAGNOSIS — E03.9 ACQUIRED HYPOTHYROIDISM: ICD-10-CM

## 2021-03-08 PROCEDURE — 99213 OFFICE O/P EST LOW 20 MIN: CPT | Performed by: INTERNAL MEDICINE

## 2021-03-08 RX ORDER — LEVOTHYROXINE SODIUM 0.05 MG/1
50 TABLET ORAL DAILY
Qty: 90 TABLET | Refills: 1 | Status: SHIPPED | OUTPATIENT
Start: 2021-03-08 | End: 2021-09-13

## 2021-03-08 NOTE — PROGRESS NOTES
3/8/2021    TELEHEALTH EVALUATION -- Audio/Visual (During OGPWW-[id number] public health emergency)    HPI:    Tyree Gar (:  1980) has requested an audio/video evaluation for the following concern(s):  1. Location of patient - Home  2. Location of physician - Office  3. Other individuals on this call - no    38 y/o WF presents for tele-health VV for follow up on acquired hypothyroidism after starting levothyroxine 6 weeks ago. Patient has been tolerating the levothyroxine since last visit and she feels like her fatigue and hair loss have been improving. She has not had any additional weight gain but she has not lost any weight yet either. Patient says she has just started exercising again with the [facility] however so she is hopeful that she will be able to lose some weight with exercise. Lab Results   Component Value Date    TSH 2.760 2021     BMI Readings from Last 3 Encounters:   21 28.29 kg/m²   19 26.28 kg/m²   18 27.08 kg/m²     Wt Readings from Last 3 Encounters:   21 175 lb 4 oz (79.5 kg)   19 162 lb 12.8 oz (73.8 kg)   18 167 lb 12.8 oz (76.1 kg)     Weight 175 lbs  BMI 28.2    Review of Systems   Constitutional: Positive for fatigue. Negative for appetite change, chills and fever. HENT: Negative for ear pain, rhinorrhea, sinus pressure, sore throat and voice change. Eyes: Negative for pain, discharge and redness. Respiratory: Negative for cough, chest tightness, shortness of breath and wheezing. Cardiovascular: Negative for chest pain and palpitations. Gastrointestinal: Positive for constipation. Negative for abdominal pain, blood in stool, diarrhea and vomiting. Endocrine: Positive for cold intolerance. Negative for heat intolerance, polydipsia, polyphagia and polyuria. See HPI   Genitourinary: Negative for dysuria and hematuria. Musculoskeletal: Negative for arthralgias, myalgias, neck pain and neck stiffness.    Skin: Negative for color change and rash. Neurological: Negative for dizziness, tremors, syncope, speech difficulty, weakness, numbness and headaches. Hematological: Negative for adenopathy. Does not bruise/bleed easily. Psychiatric/Behavioral: Negative for confusion, dysphoric mood and sleep disturbance. The patient is not nervous/anxious. All other systems reviewed and are negative. Prior to Visit Medications    Medication Sig Taking? Authorizing Provider   levothyroxine (SYNTHROID) 50 MCG tablet Take 1 tablet by mouth daily Yes Carmella Corona MD   vitamin D (CHOLECALCIFEROL) 50 MCG (2000 UT) TABS tablet Take 1 tablet by mouth daily  Carmella Corona MD   desvenlafaxine succinate (PRISTIQ) 25 MG TB24 extended release tablet Take 1 tablet by mouth daily  Carmella Corona MD   esomeprazole (NEXIUM 24HR) 20 MG delayed release capsule Take 1 capsule by mouth daily as needed (stomach pain/reflux)  Carmella Corona MD   busPIRone (BUSPAR) 5 MG tablet TAKE 1-2 TABLETS EVERY EIGHT HOURS AS NEEDED ANXIETY AND PANIC GENERIC FOR BUSPAR  Carmella Corona MD       Social History     Tobacco Use    Smoking status: Light Tobacco Smoker     Packs/day: 0.25     Years: 20.00     Pack years: 5.00     Types: Cigarettes    Smokeless tobacco: Never Used    Tobacco comment: 4 cigarettes per day   Substance Use Topics    Alcohol use: Yes     Comment: 2 drinks per week    Drug use: No        No Known Allergies,   Past Medical History:   Diagnosis Date    Adjustment disorder with mixed anxiety and depressed mood     Allergic rhinitis, cause unspecified     Anxiety state, unspecified     Palpitations     [facility]    Radial styloid tenosynovitis     wrist pain while breast feeding.     Unspecified hypothyroidism    ,   Past Surgical History:   Procedure Laterality Date    WISDOM TOOTH EXTRACTION     ,   Social History     Tobacco Use    Smoking status: Light Tobacco Smoker     Packs/day: 0.25     Years: 20.00     Pack years: 5.00     Types: Cigarettes    Smokeless tobacco: Never Used    Tobacco comment: 4 cigarettes per day   Substance Use Topics    Alcohol use: Yes     Comment: 2 drinks per week    Drug use: No   ,   Family History   Problem Relation Age of Onset    High Blood Pressure Mother     Cancer Mother     High Blood Pressure Father     Diabetes Father     Stroke Maternal Grandfather     Heart Disease Maternal Grandfather     Colon Polyps Maternal Grandfather         benign adenomatous polyp of large intestine    Diabetes Maternal Grandfather    ,   Immunization History   Administered Date(s) Administered    Tdap (Boostrix, Adacel) 11/19/2010       PHYSICAL EXAMINATION:  [ INSTRUCTIONS:  \"[x]\" Indicates a positive item  \"[]\" Indicates a negative item  -- DELETE ALL ITEMS NOT EXAMINED]  Vital Signs: (As obtained by patient/caregiver or practitioner observation)    Weight- 175 lbs       Height-5'6\"       BMI-28.2      Blood pressure-  Heart rate-  Temperature- 98F    Pulse oximetry-     Constitutional: [x] Appears well-developed and well-nourished [x] No apparent distress      [] Abnormal-   Mental status  [x] Alert and awake  [x] Oriented to person/place/time [x]Able to follow commands      Eyes:  EOM    [x]  Normal  [] Abnormal-  Sclera  [x]  Normal  [] Abnormal -         Discharge [x]  None visible  [] Abnormal -    HENT:   [x] Normocephalic, atraumatic.   [] Abnormal   [x] Mouth/Throat: Mucous membranes are moist.     External Ears [x] Normal  [] Abnormal-     Neck: [x] No visualized mass     Pulmonary/Chest: [x] Respiratory effort normal.  [x] No visualized signs of difficulty breathing or respiratory distress        [] Abnormal-      Musculoskeletal:   [x] No joint or extremity swelling         [x] Normal range of motion of neck        [] Abnormal-       Neurological:        [x] No Facial Asymmetry (Cranial nerve 7 motor function) (limited exam to video visit)          [x] No gaze palsy, speech clear, no facial droop, MAEW       [] Abnormal-         Skin:        [x] No significant exanthematous lesions or discoloration noted on facial skin         [] Abnormal-            Psychiatric:       [x] Normal mood and Affect [x] No Hallucinations        [] Abnormal-     Other pertinent observable physical exam findings-     Due to this being a TeleHealth encounter, evaluation of the following organ systems is limited: Vitals/Constitutional/EENT/Resp/CV/GI//MS/Neuro/Skin/Heme-Lymph-Imm. ASSESSMENT/PLAN:  Carly Lopez was seen today for hypothyroidism. Diagnoses and all orders for this visit:    Acquired hypothyroidism  -     levothyroxine (SYNTHROID) 50 MCG tablet; Take 1 tablet by mouth daily  -     TSH WITH REFLEX TO FT4; Future    -acquired hypothyroidism appears to be improved per history and on recent repeat TSH level  -90 day refill on levothyroxine provided for patient  -Return in about 3 months (around 6/8/2021) for thyroid. Over 50% of the total visit time of 20 minutes was spent on counseling and/or coordination of care of:   1. Acquired hypothyroidism          An  electronic signature was used to authenticate this note. --Treasure Bass MD on 3/8/2021 at 10:08 AM        Pursuant to the emergency declaration under the [facility] waiver authority and the [employer] Act, this Virtual  Visit was conducted, with patient's consent, to reduce the patient's risk of exposure to COVID-19 and provide continuity of care for an established patient. Services were provided through a video synchronous discussion virtually to substitute for in-person clinic visit.

## 2021-03-08 NOTE — PATIENT INSTRUCTIONS
Patient Education        Hypothyroidism: Care Instructions  Your Care Instructions     When you have hypothyroidism, your body doesn't make enough thyroid hormone. This hormone helps your body use energy. If your thyroid level is low, you may feel tired, be constipated, have an increase in your blood pressure, or have dry skin or memory problems. You may also get cold easily, even when it is warm. Women with low thyroid levels may have heavy menstrual periods. A blood test to find your thyroid-stimulating hormone (TSH) level is used to check for hypothyroidism. A high TSH level may mean that you have it. The treatment for hypothyroidism is thyroid hormone pills. You should start to feel better in 1 to 2 weeks. Most people need treatment for the rest of their lives. You will need regular visits with your doctor to make sure you are doing well and that you have the right dose of medicine. Follow-up care is a key part of your treatment and safety. Be sure to make and go to all appointments, and call your doctor if you are having problems. It's also a good idea to know your test results and keep a list of the medicines you take. How can you care for yourself at home? · Take your thyroid hormone medicine exactly as prescribed. Call your doctor if you think you are having a problem with your medicine. Most people do not have side effects if they take the right amount of medicine regularly. ? Take the medicine 30 minutes before breakfast, and do not take it with calcium, vitamins, or iron. ? Do not take extra doses of your thyroid medicine. It will not help you get better any faster, and it may cause side effects. ? If you forget to take a dose, do NOT take a double dose of medicine. Take your usual dose the next day. · Tell your doctor about all prescription, herbal, or over-the-counter products you take. · Take care of yourself. Eat a healthy diet, get enough sleep, and get regular exercise.   When should you call for help? Call 911 anytime you think you may need emergency care. For example, call if:    · You passed out (lost consciousness).     · You have severe trouble breathing.     · You have a very slow heartbeat (less than 60 beats a minute).     · You have a low body temperature (95°F or below). Call your doctor now or seek immediate medical care if:    · You feel tired, sluggish, or weak.     · You have trouble remembering things or concentrating.     · You do not begin to feel better 2 weeks after starting your medicine. Watch closely for changes in your health, and be sure to contact your doctor if you have any problems. Where can you learn more? Go to https://SignStorey.Trudev. org and sign in to your [employer] account. Enter Y766 in the [employer] box to learn more about \"Hypothyroidism: Care Instructions. \"     If you do not have an account, please click on the \"Sign Up Now\" link. Current as of: March 31, 2020               Content Version: 12.6  © 3523-1563 [employer]. Care instructions adapted under license by [facility] ([facility]). If you have questions about a medical condition or this instruction, always ask your healthcare professional. [address] any warranty or liability for your use of this information.

## 2021-03-21 ASSESSMENT — ENCOUNTER SYMPTOMS
COUGH: 0
COLOR CHANGE: 0
RHINORRHEA: 0
SORE THROAT: 0
SINUS PRESSURE: 0
EYE DISCHARGE: 0
CONSTIPATION: 1
VOMITING: 0
BLOOD IN STOOL: 0
EYE REDNESS: 0
DIARRHEA: 0
ABDOMINAL PAIN: 0
VOICE CHANGE: 0
WHEEZING: 0
SHORTNESS OF BREATH: 0
EYE PAIN: 0
CHEST TIGHTNESS: 0

## 2021-05-21 ENCOUNTER — OFFICE VISIT (OUTPATIENT)
Dept: OBGYN CLINIC | Age: 41
End: 2021-05-21
Payer: OTHER GOVERNMENT

## 2021-05-21 VITALS
SYSTOLIC BLOOD PRESSURE: 130 MMHG | BODY MASS INDEX: 27.47 KG/M2 | HEART RATE: 79 BPM | DIASTOLIC BLOOD PRESSURE: 85 MMHG | HEIGHT: 67 IN | WEIGHT: 175 LBS

## 2021-05-21 DIAGNOSIS — Z11.51 SCREENING FOR HPV (HUMAN PAPILLOMAVIRUS): ICD-10-CM

## 2021-05-21 DIAGNOSIS — Z01.419 WELL WOMAN EXAM: Primary | ICD-10-CM

## 2021-05-21 DIAGNOSIS — Z12.4 SCREENING FOR CERVICAL CANCER: ICD-10-CM

## 2021-05-21 DIAGNOSIS — Z12.31 SCREENING MAMMOGRAM, ENCOUNTER FOR: ICD-10-CM

## 2021-05-21 PROCEDURE — 99386 PREV VISIT NEW AGE 40-64: CPT | Performed by: NURSE PRACTITIONER

## 2021-05-21 ASSESSMENT — ENCOUNTER SYMPTOMS
GASTROINTESTINAL NEGATIVE: 1
RESPIRATORY NEGATIVE: 1
EYES NEGATIVE: 1

## 2021-05-21 NOTE — PROGRESS NOTES
Gus Harmon is a 37 y.o. female who presents today for her medical conditions/ complaints as noted below. Gus Harmon is c/o of Establish Care and Annual Exam        HPI  Pt presents today as a new patient for pap smear and breast exam.      Last mammogram:  2016  Last pap smear:    Contraception:  none  :  2  Para:  2  AB:  0  Last bone density:  None   Last colonoscopy: none    Patient's last menstrual period was 2021 (approximate). No obstetric history on file. Past Medical History:   Diagnosis Date    Adjustment disorder with mixed anxiety and depressed mood     Allergic rhinitis, cause unspecified     Anxiety state, unspecified     Palpitations     Paroxysmal SVT (supraventricular tachycardia) ([facility]) 2014    Radial styloid tenosynovitis     wrist pain while breast feeding.      Past Surgical History:   Procedure Laterality Date    WISDOM TOOTH EXTRACTION       Family History   Problem Relation Age of Onset    High Blood Pressure Mother     Cancer Mother         endometrial cancer    High Blood Pressure Father     Diabetes Father     Stroke Maternal Grandfather     Heart Disease Maternal Grandfather     Colon Polyps Maternal Grandfather         benign adenomatous polyp of large intestine    Diabetes Maternal Grandfather      Social History     Tobacco Use    Smoking status: Light Tobacco Smoker     Packs/day: 0.25     Years: 20.00     Pack years: 5.00     Types: Cigarettes    Smokeless tobacco: Never Used    Tobacco comment: 4 cigarettes per day   Substance Use Topics    Alcohol use: Yes     Comment: 2 drinks per week       Current Outpatient Medications   Medication Sig Dispense Refill    levothyroxine (SYNTHROID) 50 MCG tablet Take 1 tablet by mouth daily 90 tablet 1    vitamin D (CHOLECALCIFEROL) 50 MCG (2000 UT) TABS tablet Take 1 tablet by mouth daily 90 tablet 3    desvenlafaxine succinate (PRISTIQ) 25 MG TB24 extended release tablet Take 1 tablet inverted nipple, mass, nipple discharge, skin change or tenderness. Abdominal:      General: Bowel sounds are normal. There is no distension. Palpations: Abdomen is soft. There is no mass. Tenderness: There is no abdominal tenderness. Hernia: There is no hernia in the left inguinal area. Genitourinary:     Labia:         Right: No rash, tenderness or lesion. Left: No rash, tenderness or lesion. Vagina: No vaginal discharge or tenderness. Cervix: No cervical motion tenderness or discharge (normal cervical mucosa). Uterus: Not enlarged and not tender. Adnexa:         Right: No mass, tenderness or fullness. Left: No mass, tenderness or fullness. Rectum: No external hemorrhoid. Comments: Pap collected for cervical cytology  Musculoskeletal:         General: No tenderness. Normal range of motion. Cervical back: Normal range of motion and neck supple. Lymphadenopathy:      Cervical:      Right cervical: No superficial, deep or posterior cervical adenopathy. Left cervical: No superficial, deep or posterior cervical adenopathy. Upper Body:      Right upper body: No supraclavicular, pectoral or epitrochlear adenopathy. Left upper body: No supraclavicular, pectoral or epitrochlear adenopathy. Skin:     General: Skin is warm and dry. Findings: No rash. Neurological:      Mental Status: She is alert and oriented to person, place, and time. She is not disoriented. Sensory: No sensory deficit. Coordination: Coordination normal.      Gait: Gait normal.      Deep Tendon Reflexes: Reflexes are normal and symmetric. Psychiatric:         Speech: Speech normal.         Behavior: Behavior normal.         Thought Content: Thought content normal.         Judgment: Judgment normal.          Diagnosis Orders   1. Well woman exam  PAP SMEAR    Human papillomavirus (HPV) DNA probe thin prep high risk   2.  Screening for cervical cancer  PAP SMEAR   3. Screening for HPV (human papillomavirus)  Human papillomavirus (HPV) DNA probe thin prep high risk   4. Screening mammogram, encounter for  RAJAN DIGITAL SCREEN W OR WO CAD BILATERAL       MEDICATIONS:  No orders of the defined types were placed in this encounter. ORDERS:  Orders Placed This Encounter   Procedures    RAJAN DIGITAL SCREEN W OR WO CAD BILATERAL    PAP SMEAR    Human papillomavirus (HPV) DNA probe thin prep high risk       PLAN:  Pap collected  Mammogram ordered. There are no Patient Instructions on file for this visit.

## 2021-05-26 LAB
HPV COMMENT: NORMAL
HPV TYPE 16: NOT DETECTED
HPV TYPE 18: NOT DETECTED
HPVOH (OTHER TYPES): NOT DETECTED

## 2021-06-15 DIAGNOSIS — E03.9 ACQUIRED HYPOTHYROIDISM: ICD-10-CM

## 2021-06-15 LAB
T4 FREE: 1.17 NG/DL (ref 0.93–1.7)
TSH REFLEX FT4: 4.98 UIU/ML (ref 0.35–5.5)

## 2021-06-16 ENCOUNTER — PATIENT MESSAGE (OUTPATIENT)
Dept: FAMILY MEDICINE CLINIC | Age: 41
End: 2021-06-16

## 2021-06-16 NOTE — TELEPHONE ENCOUNTER
From: Chari Umaña  To: Lakeisha Santana MD  Sent: 6/16/2021 10:52 AM CDT  Subject: Test Results Question    I'd like to do a zoom call in lieu of fighting the construction traffic not having any issues and my apt was for med check. Do have just a quick question regarding the difference in tests let me know if you don't mind to chat or just answer via email.  Thanks

## 2021-06-17 DIAGNOSIS — E03.9 ACQUIRED HYPOTHYROIDISM: Primary | ICD-10-CM

## 2021-06-17 NOTE — TELEPHONE ENCOUNTER
Please contact patient to schedule follow up on hypothyroidism in 3 months. I already ordered labs for that appointment. It is fine to schedule virtually if patient prefers.

## 2021-07-22 DIAGNOSIS — F41.1 GAD (GENERALIZED ANXIETY DISORDER): ICD-10-CM

## 2021-07-22 DIAGNOSIS — F41.0 PANIC DISORDER WITHOUT AGORAPHOBIA: ICD-10-CM

## 2021-07-22 RX ORDER — BUSPIRONE HYDROCHLORIDE 5 MG/1
TABLET ORAL
Qty: 60 TABLET | Refills: 3 | Status: SHIPPED | OUTPATIENT
Start: 2021-07-22

## 2021-07-22 NOTE — TELEPHONE ENCOUNTER
Evelin Echeverria called to request a refill on her medication.       Last office visit : 3/8/2021   Next office visit : 9/16/2021     Requested Prescriptions     Pending Prescriptions Disp Refills    busPIRone (BUSPAR) 5 MG tablet [Pharmacy Med Name: BUSPIRONE HYDROCHLORIDE 5MG TABLET] 60 tablet 3     Sig: TAKE ONE (1) TO TWO (2) TABLETS EVERY EIGHT HOURS AS NEEDED ANXIETY AND PANIC GENERIC FOR [address]

## 2021-07-23 ENCOUNTER — E-VISIT (OUTPATIENT)
Dept: FAMILY MEDICINE CLINIC | Age: 41
End: 2021-07-23
Payer: OTHER GOVERNMENT

## 2021-07-23 DIAGNOSIS — M54.32 SCIATICA OF LEFT SIDE: Primary | ICD-10-CM

## 2021-07-23 PROCEDURE — 98971 NQHP OL DIG ASSMT&MGMT 11-20: CPT | Performed by: NURSE PRACTITIONER

## 2021-07-26 ENCOUNTER — TELEPHONE (OUTPATIENT)
Dept: FAMILY MEDICINE CLINIC | Age: 41
End: 2021-07-26

## 2021-07-26 DIAGNOSIS — M54.30 SCIATICA, UNSPECIFIED LATERALITY: Primary | ICD-10-CM

## 2021-07-26 RX ORDER — CYCLOBENZAPRINE HCL 5 MG
5 TABLET ORAL 3 TIMES DAILY PRN
Qty: 21 TABLET | Refills: 0 | Status: SHIPPED | OUTPATIENT
Start: 2021-07-26 | End: 2021-08-02

## 2021-07-26 RX ORDER — METHYLPREDNISOLONE 4 MG/1
TABLET ORAL
Qty: 1 KIT | Refills: 0 | Status: SHIPPED | OUTPATIENT
Start: 2021-07-26 | End: 2021-08-01

## 2021-08-13 DIAGNOSIS — F41.1 GAD (GENERALIZED ANXIETY DISORDER): ICD-10-CM

## 2021-08-13 RX ORDER — DESVENLAFAXINE 25 MG/1
TABLET, EXTENDED RELEASE ORAL
Qty: 90 TABLET | Refills: 3 | Status: SHIPPED | OUTPATIENT
Start: 2021-08-13 | End: 2022-06-14

## 2021-09-13 DIAGNOSIS — E03.9 ACQUIRED HYPOTHYROIDISM: ICD-10-CM

## 2021-09-13 RX ORDER — LEVOTHYROXINE SODIUM 0.05 MG/1
TABLET ORAL
Qty: 90 TABLET | Refills: 1 | Status: SHIPPED | OUTPATIENT
Start: 2021-09-13 | End: 2021-09-16 | Stop reason: DRUGHIGH

## 2021-09-16 ENCOUNTER — TELEMEDICINE (OUTPATIENT)
Dept: FAMILY MEDICINE CLINIC | Age: 41
End: 2021-09-16
Payer: OTHER GOVERNMENT

## 2021-09-16 DIAGNOSIS — E03.9 ACQUIRED HYPOTHYROIDISM: ICD-10-CM

## 2021-09-16 DIAGNOSIS — E66.3 OVERWEIGHT (BMI 25.0-29.9): ICD-10-CM

## 2021-09-16 DIAGNOSIS — F41.1 GAD (GENERALIZED ANXIETY DISORDER): Primary | ICD-10-CM

## 2021-09-16 DIAGNOSIS — E55.9 VITAMIN D DEFICIENCY: ICD-10-CM

## 2021-09-16 PROBLEM — E03.8 SUBCLINICAL HYPOTHYROIDISM: Status: RESOLVED | Noted: 2018-11-16 | Resolved: 2021-09-16

## 2021-09-16 PROCEDURE — 99214 OFFICE O/P EST MOD 30 MIN: CPT | Performed by: INTERNAL MEDICINE

## 2021-09-16 RX ORDER — LEVOTHYROXINE SODIUM 0.07 MG/1
75 TABLET ORAL DAILY
Qty: 30 TABLET | Refills: 3 | Status: SHIPPED | OUTPATIENT
Start: 2021-09-16 | End: 2022-01-10

## 2021-09-16 ASSESSMENT — ENCOUNTER SYMPTOMS
EYE PAIN: 0
COLOR CHANGE: 0
COUGH: 0
WHEEZING: 0
ABDOMINAL PAIN: 0
VOMITING: 0
CHEST TIGHTNESS: 0
SORE THROAT: 0
DIARRHEA: 0
BLOOD IN STOOL: 0
EYE REDNESS: 0
EYE DISCHARGE: 0
VOICE CHANGE: 0
SHORTNESS OF BREATH: 0
SINUS PRESSURE: 0
RHINORRHEA: 0

## 2021-09-16 NOTE — PATIENT INSTRUCTIONS
Patient Education        Hypothyroidism: Care Instructions  Your Care Instructions     When you have hypothyroidism, your body doesn't make enough thyroid hormone. This hormone helps your body use energy. If your thyroid level is low, you may feel tired, be constipated, have an increase in your blood pressure, or have dry skin or memory problems. You may also get cold easily, even when it is warm. Women with low thyroid levels may have heavy menstrual periods. A blood test to find your thyroid-stimulating hormone (TSH) level is used to check for hypothyroidism. A high TSH level may mean that you have it. The treatment for hypothyroidism is thyroid hormone pills. You should start to feel better in 1 to 2 weeks. Most people need treatment for the rest of their lives. You will need regular visits with your doctor to make sure you are doing well and that you have the right dose of medicine. Follow-up care is a key part of your treatment and safety. Be sure to make and go to all appointments, and call your doctor if you are having problems. It's also a good idea to know your test results and keep a list of the medicines you take. How can you care for yourself at home? · Take your thyroid hormone medicine exactly as prescribed. Call your doctor if you think you are having a problem with your medicine. Most people do not have side effects if they take the right amount of medicine regularly. ? Take the medicine 30 minutes before breakfast, and do not take it with calcium, vitamins, or iron. ? Do not take extra doses of your thyroid medicine. It will not help you get better any faster, and it may cause side effects. ? If you forget to take a dose, do NOT take a double dose of medicine. Take your usual dose the next day. · Tell your doctor about all prescription, herbal, or over-the-counter products you take. · Take care of yourself. Eat a healthy diet, get enough sleep, and get regular exercise.   When should you call for help? Call 911 anytime you think you may need emergency care. For example, call if:    · You passed out (lost consciousness).     · You have severe trouble breathing.     · You have a very slow heartbeat (less than 60 beats a minute).     · You have a low body temperature (95°F or below). Call your doctor now or seek immediate medical care if:    · You feel tired, sluggish, or weak.     · You have trouble remembering things or concentrating.     · You do not begin to feel better 2 weeks after starting your medicine. Watch closely for changes in your health, and be sure to contact your doctor if you have any problems. Where can you learn more? Go to https://OPTIMIZERx.[place]igi [email or web address] org and sign in to your [employer] account. Enter G013 in the Spazzles box to learn more about \"Hypothyroidism: Care Instructions. \"     If you do not have an account, please click on the \"Sign Up Now\" link. Current as of: March 31, 2020               Content Version: 12.9  © 2006-2021 [employer]. Care instructions adapted under license by [facility] ([facility]). If you have questions about a medical condition or this instruction, always ask your healthcare professional. [place] [address] any warranty or liability for your use of this information. Patient Education        Learning About Anxiety Disorders  What are anxiety disorders? Anxiety disorders are a type of medical problem. They cause severe anxiety. When you feel anxious, you feel that something bad is about to happen. This feeling interferes with your life. These disorders include:  · Generalized anxiety disorder. You feel worried and stressed about many everyday events and activities. This goes on for several months and disrupts your life on most days. · Panic disorder. You have repeated panic attacks. A panic attack is a sudden, intense fear or anxiety. It may make you feel short of breath.  Your heart may pound.  · Social anxiety disorder. You feel very anxious about what you will say or do in front of people. For example, you may be scared to talk or eat in public. This problem affects your daily life. · Phobias. You are very scared of a specific object, situation, or activity. For example, you may fear spiders, high places, or small spaces. What are the symptoms? Generalized anxiety disorder  Symptoms may include:  · Feeling worried and stressed about many things almost every day. · Feeling tired or irritable. You may have a hard time concentrating. · Having headaches or muscle aches. · Having a hard time getting to sleep or staying asleep. Panic disorder  You may have repeated panic attacks when there is no reason for feeling afraid. You may change your daily activities because you worry that you will have another attack. Symptoms may include:  · Intense fear, terror, or anxiety. · Trouble breathing or very fast breathing. · Chest pain or tightness. · A heartbeat that races or is not regular. Social anxiety disorder  Symptoms may include:  · Fear about a social situation, such as eating in front of others or speaking in public. You may worry a lot. Or you may be afraid that something bad will happen. · Anxiety that can cause you to blush, sweat, and feel shaky. · A heartbeat that is faster than normal.  · A hard time focusing. Phobias  Symptoms may include:  · More fear than most people of being around an object, being in a situation, or doing an activity. You might also be stressed about the chance of being around the thing you fear. · Worry about losing control, panicking, fainting, or having physical symptoms like a faster heartbeat when you are around the situation or object. How are these disorders treated? Anxiety disorders can be treated with medicines or counseling. A combination of both may be used. Medicines may include:  · Antidepressants.  These may help your symptoms by keeping chemicals in your brain in balance. · Benzodiazepines. These may give you short-term relief of your symptoms. Some people use cognitive-behavioral therapy. A therapist helps you learn to change stressful or bad thoughts into helpful thoughts. Lead a healthy lifestyle  A healthy lifestyle may help you feel better. · Get at least 30 minutes of exercise on most days of the week. Walking is a good choice. · Eat a healthy diet. Include fruits, vegetables, lean proteins, and whole grains in your diet each day. · Try to go to bed at the same time every night. Try for 8 hours of sleep a night. · Find ways to manage stress. Try relaxation exercises. · Avoid alcohol and illegal drugs. Follow-up care is a key part of your treatment and safety. Be sure to make and go to all appointments, and call your doctor if you are having problems. It's also a good idea to know your test results and keep a list of the medicines you take. Where can you learn more? Go to https://Panther Express.[email or web address] org and sign in to your [employer] account. Enter Q521 in the [employer] box to learn more about \"Learning About Anxiety Disorders. \"     If you do not have an account, please click on the \"Sign Up Now\" link. Current as of: September 23, 2020               Content Version: 12.9  © 2006-2021 Healthwise, [employer]. Care instructions adapted under license by [facility] ([facility]). If you have questions about a medical condition or this instruction, always ask your healthcare professional. [place] [address] any warranty or liability for your use of this information. Patient Education        When You Are Overweight: Care Instructions  Your Care Instructions     If you're overweight, your doctor may recommend that you make changes in your eating and exercise habits.  Being overweight can lead to serious health problems, such as high blood pressure, heart disease, type 2 diabetes, and arthritis, or it can make these problems worse. Eating a healthy diet and being more active can help you reach and stay at a healthy weight. You don't have to make huge changes all at once. Start by making small changes in your eating and exercise habits. To lose weight, you need to burn more calories than you take in. You can do this by eating healthy foods in reasonable amounts and becoming more active every day. Follow-up care is a key part of your treatment and safety. Be sure to make and go to all appointments, and call your doctor if you are having problems. It's also a good idea to know your test results and keep a list of the medicines you take. How can you care for yourself at home? · Improve your eating habits. You'll be more successful if you work on changing one eating habit at a time. All foods, if eaten in moderation, can be part of healthy eating. Remember to:  ? Eat a variety of foods from each food group. Include grains, vegetables, fruits, dairy, and protein foods. ? Limit foods high in fat, sugar, and calories. ? Eat slowly. And don't do anything else, such as watch TV, while you are eating. ? Pay attention to portion sizes. Put your food on a smaller plate. ? Plan your meals ahead of time. You'll be less likely to grab something that's not as healthy. · Get active. Regular activity can help you feel better, have more energy, and burn more calories. If you haven't been active, start slowly. Start with at least 30 minutes of moderate activity on most days of the week. Then gradually increase the amount of activity. Try for 60 or 90 minutes a day, at least 5 days a week. There are a lot of ways to fit activity into your life. You can:  ? Walk or bike to the store. Or walk with a friend, or walk the dog.  ? Mow the lawn, rake leaves, shovel snow, or do some gardening. ? Use the stairs instead of the elevator, at least for a few floors. · Change your thinking.  Your thoughts have a lot to do with how you feel and what you do. When you're trying to reach a healthy weight, changing how you think about certain things may help. Here are some ideas:  ? Don't compare yourself to others. Healthy bodies come in all shapes and sizes. ? Pay attention to how hungry or full you feel. When you eat, be aware of why you're eating and how much you're eating. ? Focus on improving your health instead of dieting. Dieting almost never works over the long term. · Ask your doctor about other health professionals who can help you reach a healthy weight. ? A dietitian can help you make healthy changes in your diet. ? An exercise specialist or  can help you develop a safe and effective exercise program.  ? A counselor or psychiatrist can help you cope with issues such as depression, anxiety, or family problems that can make it hard to focus on reaching a healthy weight. · Get support from your family, your doctor, your friends, a support group--and support yourself. Where can you learn more? Go to https://REPUCOM.IRL Gaming. org and sign in to your [employer] account. Enter W233 in the Kylesexpressor software box to learn more about \"When You Are Overweight: Care Instructions. \"     If you do not have an account, please click on the \"Sign Up Now\" link. Current as of: March 17, 2021               Content Version: 12.9  © 9109-0231 Healthwise, Incorporated. Care instructions adapted under license by [facility] ([facility]). If you have questions about a medical condition or this instruction, always ask your healthcare professional. [place] [address] any warranty or liability for your use of this information.

## 2021-09-16 NOTE — PROGRESS NOTES
2021    Chief Complaint   Patient presents with    Hypothyroidism    Anxiety        TELEHEALTH EVALUATION -- Audio/Visual (During TJLFL-23 public health emergency)    HPI:    Barbara Colvin (:  1980) has requested an audio/video evaluation for the following concern(s):  1. Location of patient - Home  2. Location of physician - Office  3. Other individuals on this call - no    37 y.o. female being seen for Hypothyroidism and Anxiety     Generalized anxiety disorder has improved with treatment with desvenlafaxine 25 mg once daily. She denies panic attacks but she has buspirinone she can take as needed on days anxiety might be worse. Patient's daughter and  have had covid-19 infection recently but patient denies symptoms herself. She has not been vaccinated and does not plan to get the vaccine at this time. Hypothyroidism  Patient presents for follow up on thyroid function. Symptoms consist of mild fatigue and difficulty losing weight. The problem has been stable. Patient has been compliant with levothyroxine. TSH is slightly improved from previous blood work 3 months ago but patient usually takes her thyroid medicine with her other medications. Lab Results   Component Value Date    TSHFT4 4.98 06/15/2021    TSH 4.130 09/15/2021     Review of Systems   Constitutional: Negative for appetite change, chills, fatigue and fever. HENT: Negative for ear pain, rhinorrhea, sinus pressure, sore throat and voice change. Eyes: Negative for pain, discharge and redness. Respiratory: Negative for cough, chest tightness, shortness of breath and wheezing. Cardiovascular: Negative for chest pain and palpitations. Gastrointestinal: Negative for abdominal pain, blood in stool, diarrhea and vomiting. Endocrine: Negative for cold intolerance, heat intolerance and polydipsia. Genitourinary: Negative for dysuria and hematuria.    Musculoskeletal: Negative for arthralgias, myalgias, neck pain and neck stiffness. Skin: Negative for color change and rash. Neurological: Negative for dizziness, tremors, syncope, speech difficulty, weakness, numbness and headaches. Hematological: Negative for adenopathy. Does not bruise/bleed easily. Psychiatric/Behavioral: Negative for confusion, dysphoric mood, self-injury, sleep disturbance and suicidal ideas. The patient is nervous/anxious. All other systems reviewed and are negative. Prior to Visit Medications    Medication Sig Taking? Authorizing Provider   levothyroxine (SYNTHROID) 75 MCG tablet Take 1 tablet by mouth daily Yes Glenna Brasher MD   desvenlafaxine succinate (PRISTIQ) 25 MG TB24 extended release tablet TAKE ONE TABLET DAILY GENERIC FOR PRISTIQ   Glenna Brasher MD   busPIRone (BUSPAR) 5 MG tablet TAKE ONE (1) TO TWO (2) TABLETS EVERY EIGHT HOURS AS NEEDED ANXIETY AND PANIC GENERIC FOR BUSPAR   Glenna Brasher MD   vitamin D (CHOLECALCIFEROL) 50 MCG (2000 UT) TABS tablet Take 1 tablet by mouth daily  Glenna Brasher MD   esomeprazole (NEXIUM 24HR) 20 MG delayed release capsule Take 1 capsule by mouth daily as needed (stomach pain/reflux)  Glenna Brasher MD       Social History     Tobacco Use    Smoking status: Light Tobacco Smoker     Packs/day: 0.25     Years: 20.00     Pack years: 5.00     Types: Cigarettes    Smokeless tobacco: Never Used    Tobacco comment: 4 cigarettes per day   Vaping Use    Vaping Use: Never used   Substance Use Topics    Alcohol use: Yes     Comment: 2 drinks per week    Drug use: No        Past Medical History:   Diagnosis Date    Adjustment disorder with mixed anxiety and depressed mood     Allergic rhinitis, cause unspecified     Anxiety state, unspecified     Palpitations     Paroxysmal SVT (supraventricular tachycardia) ([facility] 09/03/2014    Radial styloid tenosynovitis     wrist pain while breast feeding.         Past Surgical History:   Procedure Laterality Date    WISDOM TOOTH EXTRACTION          No Known Allergies     PHYSICAL EXAMINATION:  [ INSTRUCTIONS:  \"[x]\" Indicates a positive item  \"[]\" Indicates a negative item  -- DELETE ALL ITEMS NOT EXAMINED]  Vital Signs: (As obtained by patient/caregiver or practitioner observation)    Patient-Reported Vitals 10/11/2021 3/8/2021 9/25/2020   Patient-Reported Weight 169 lbs 175 lbs 158 lbs   Patient-Reported Height 5'6\" 5'6\" 5'6\"   Patient-Reported Temperature 97F 98F 98F         Constitutional: [x] Appears well-developed and well-nourished [x] No apparent distress      [] Abnormal-   Mental status  [x] Alert and awake  [x] Oriented to person/place/time [x]Able to follow commands      Eyes:  EOM    [x]  Normal  [] Abnormal-  Sclera  [x]  Normal  [] Abnormal -         Discharge [x]  None visible  [] Abnormal -    HENT:   [x] Normocephalic, atraumatic. [] Abnormal   [x] Mouth/Throat: Mucous membranes are moist.     External Ears [x] Normal  [] Abnormal-     Neck: [x] No visualized mass     Pulmonary/Chest: [x] Respiratory effort normal.  [x] No visualized signs of difficulty breathing or respiratory distress        [] Abnormal-      Musculoskeletal:   [x] No joint or extremity swelling         [x] Normal range of motion of neck        [] Abnormal-       Neurological:        [x] No Facial Asymmetry (Cranial nerve 7 motor function) (limited exam to video visit)          [x] No gaze palsy, speech clear, no facial droop, MAEW       [] Abnormal-         Skin:        [x] No significant exanthematous lesions or discoloration noted on facial skin         [] Abnormal-            Psychiatric:       [x] Normal mood and Affect [x] No Hallucinations        [] Abnormal-     Other pertinent observable physical exam findings-     Due to this being a TeleHealth encounter, evaluation of the following organ systems is limited: Vitals/Constitutional/EENT/Resp/CV/GI//MS/Neuro/Skin/Heme-Lymph-Imm.     Lab Results   Component Value Date    TSHFT4 4.98 06/15/2021    TSH 4.130 09/15/2021     Lab Results   Component Value Date    VITD25 23.1 (L) 01/19/2021     ASSESSMENT/PLAN:  Micaela Cerda was seen today for hypothyroidism and anxiety. Diagnoses and all orders for this visit:    PREM (generalized anxiety disorder)    Acquired hypothyroidism  -     levothyroxine (SYNTHROID) 75 MCG tablet; Take 1 tablet by mouth daily  -     TSH WITH REFLEX TO FT4; Future    Vitamin D deficiency  -     Vitamin D 25 Hydroxy; Future    Overweight (BMI 25.0-29. 9)    Labs reviewed with patient. Refills Provided. Hypothyroidism not well controlled. Medication adjusted today as follows levothyroxine dose increased to 75 mcg daily and patient instructed to take medication by itself to help with better absorption  Generalized Anxiety Disorder well controlled with desvenlafaxine which was continued today. Encouraged Exercise and relaxation techniques for stress relief. Overweight due to excess caloric intake-improved. Continue/Increase efforts at low carbohydrate diet, exercise, and weight loss/efforts at normalizing BMI. Return in about 3 months (around 12/16/2021) for thyroid, recheck mood. An  electronic signature was used to authenticate this note. --Sergio Chliders MD on 10/11/2021 at 10:36 PM        Pursuant to the emergency declaration under the [facility], [facility] waiver authority and the [employer] and [employer] Act, this Virtual  Visit was conducted, with patient's consent, to reduce the patient's risk of exposure to COVID-19 and provide continuity of care for an established patient. Services were provided through a video synchronous discussion virtually to substitute for in-person clinic visit.

## 2021-10-11 PROBLEM — E03.9 ACQUIRED HYPOTHYROIDISM: Status: ACTIVE | Noted: 2021-10-11

## 2021-10-11 PROBLEM — E55.9 VITAMIN D DEFICIENCY: Status: ACTIVE | Noted: 2021-10-11

## 2021-11-02 ENCOUNTER — OFFICE VISIT (OUTPATIENT)
Dept: FAMILY MEDICINE CLINIC | Age: 41
End: 2021-11-02
Payer: OTHER GOVERNMENT

## 2021-11-02 ENCOUNTER — HOSPITAL ENCOUNTER (OUTPATIENT)
Dept: GENERAL RADIOLOGY | Age: 41
Discharge: HOME | End: 2021-11-02
Payer: OTHER GOVERNMENT

## 2021-11-02 VITALS
SYSTOLIC BLOOD PRESSURE: 118 MMHG | TEMPERATURE: 97.2 F | DIASTOLIC BLOOD PRESSURE: 76 MMHG | HEIGHT: 66 IN | HEART RATE: 83 BPM | OXYGEN SATURATION: 100 % | BODY MASS INDEX: 28.28 KG/M2 | WEIGHT: 176 LBS

## 2021-11-02 DIAGNOSIS — M53.3 CHRONIC LEFT SI JOINT PAIN: ICD-10-CM

## 2021-11-02 DIAGNOSIS — M79.18 LEFT BUTTOCK PAIN: ICD-10-CM

## 2021-11-02 DIAGNOSIS — G89.29 CHRONIC LEFT SI JOINT PAIN: ICD-10-CM

## 2021-11-02 DIAGNOSIS — M54.16 LUMBAR RADICULOPATHY, CHRONIC: ICD-10-CM

## 2021-11-02 DIAGNOSIS — M62.830 MUSCLE SPASM OF BACK: ICD-10-CM

## 2021-11-02 DIAGNOSIS — E66.3 OVERWEIGHT (BMI 25.0-29.9): ICD-10-CM

## 2021-11-02 DIAGNOSIS — M47.817 FACET ARTHRITIS, DEGENERATIVE, L5-S1 LEVEL, LUMBOSACRAL SPINE: ICD-10-CM

## 2021-11-02 DIAGNOSIS — M79.18 LEFT BUTTOCK PAIN: Primary | ICD-10-CM

## 2021-11-02 PROCEDURE — 72100 X-RAY EXAM L-S SPINE 2/3 VWS: CPT

## 2021-11-02 PROCEDURE — 99214 OFFICE O/P EST MOD 30 MIN: CPT | Performed by: INTERNAL MEDICINE

## 2021-11-02 PROCEDURE — 96372 THER/PROPH/DIAG INJ SC/IM: CPT | Performed by: INTERNAL MEDICINE

## 2021-11-02 RX ORDER — DICLOFENAC EPOLAMINE 0.01 G/1
1 SYSTEM TOPICAL 2 TIMES DAILY
Qty: 60 PATCH | Refills: 2 | Status: SHIPPED | OUTPATIENT
Start: 2021-11-02 | End: 2022-03-17

## 2021-11-02 RX ORDER — METHYLPREDNISOLONE ACETATE 80 MG/ML
40 INJECTION, SUSPENSION INTRA-ARTICULAR; INTRALESIONAL; INTRAMUSCULAR; SOFT TISSUE ONCE
Status: COMPLETED | OUTPATIENT
Start: 2021-11-02 | End: 2021-11-02

## 2021-11-02 RX ADMIN — METHYLPREDNISOLONE ACETATE 40 MG: 80 INJECTION, SUSPENSION INTRA-ARTICULAR; INTRALESIONAL; INTRAMUSCULAR; SOFT TISSUE at 09:59

## 2021-11-02 ASSESSMENT — ENCOUNTER SYMPTOMS
RHINORRHEA: 0
COUGH: 0
EYE DISCHARGE: 0
CHEST TIGHTNESS: 0
VOICE CHANGE: 0
SORE THROAT: 0
DIARRHEA: 0
BACK PAIN: 0
ABDOMINAL PAIN: 0
SINUS PRESSURE: 0
WHEEZING: 0
VOMITING: 0
EYE PAIN: 0
SHORTNESS OF BREATH: 0
EYE REDNESS: 0
COLOR CHANGE: 0
BLOOD IN STOOL: 0

## 2021-11-02 NOTE — PROGRESS NOTES
After obtaining consent, and per orders of Dr. Dhara Yeager, injection of Depo-Medrol given in Left upper quad. gluteus by Lico Vance MA. Patient instructed to remain in clinic for 20 minutes afterwards, and to report any adverse reaction to me immediately.

## 2021-11-02 NOTE — PROGRESS NOTES
Merline Anton is a 37 y.o. female who presents today for   Chief Complaint   Patient presents with    Back Pain       HPI  36 y/o WF here with c/o LBP on left side for the past 3-4 months which initially was located left buttock area which is now radiating down left side of her leg to foot now. Pain feels like a burning pain and tightness in her buttock and leg but no numbness except her left 5th toe. No weakness or issues with bowel/bladder symptoms. She had been walking about 3 miles/day for exercise when she started getting for Anniversary trip to [facility]. She did not an evisit in July due to the severe pain which was making it hard to sleep, sit, and lay down. She went to [facility] ER in July 2021 due to this issue and she was diagnosed with probable piriformis syndrome and given a steroid shot which did help over a couple of days along with ibuprofen 800 mg 3x/day and a steroid. Pain was very active the rest of the Summer and she was building a pool and carrying wood and very active and pain was intermittent but shooting and will worsen after sitting most of the day at work. Pain is 7/10 on a bad day now but is 2/10 currently. Ibuprofen and steroids as well as well icing and heat. About 3 yrs ago, she initially has some mild mid low back pain with Cross Fit exercises but nothing too severe. Her chiropractor checked a pelvic xray at [address] 2 weeks ago which was reportedly normal.     Review of Systems   Constitutional: Negative for appetite change, chills, fatigue and fever. HENT: Negative for ear pain, rhinorrhea, sinus pressure, sore throat and voice change. Eyes: Negative for pain, discharge and redness. Respiratory: Negative for cough, chest tightness, shortness of breath and wheezing. Cardiovascular: Negative for chest pain and palpitations. Gastrointestinal: Negative for abdominal pain, blood in stool, diarrhea and vomiting.    Endocrine: Negative for cold intolerance, heat intolerance Social History     Tobacco Use    Smoking status: Light Tobacco Smoker     Packs/day: 0.25     Years: 20.00     Pack years: 5.00     Types: Cigarettes    Smokeless tobacco: Never Used    Tobacco comment: 4 cigarettes per day   Vaping Use    Vaping Use: Never used   Substance Use Topics    Alcohol use: Yes     Comment: 2 drinks per week    Drug use: No       Family History   Problem Relation Age of Onset    High Blood Pressure Mother     Cancer Mother         endometrial cancer    High Blood Pressure Father     Diabetes Father     Stroke Maternal Grandfather     Heart Disease Maternal Grandfather     Colon Polyps Maternal Grandfather         benign adenomatous polyp of large intestine    Diabetes Maternal Grandfather        /76   Pulse 83   Temp 97.2 °F (36.2 °C)   Ht 5' 6\" (1.676 m)   Wt 176 lb (79.8 kg)   SpO2 100%   BMI 28.41 kg/m²     Physical Exam  Vitals reviewed. Constitutional:       General: She is not in acute distress. Appearance: Normal appearance. She is well-developed and normal weight. She is not ill-appearing or toxic-appearing. HENT:      Head: Normocephalic and atraumatic. Right Ear: External ear normal.      Left Ear: External ear normal.      Nose: Nose normal.      Mouth/Throat:      Lips: Pink. Mouth: Mucous membranes are moist.   Eyes:      General:         Right eye: No discharge. Left eye: No discharge. Conjunctiva/sclera: Conjunctivae normal.      Pupils: Pupils are equal, round, and reactive to light. Neck:      Thyroid: No thyromegaly. Vascular: Normal carotid pulses. No carotid bruit or JVD. Trachea: Trachea and phonation normal. No tracheal tenderness. Cardiovascular:      Rate and Rhythm: Normal rate and regular rhythm. Pulses:           Carotid pulses are 2+ on the right side and 2+ on the left side. Posterior tibial pulses are 2+ on the right side and 2+ on the left side.       Heart sounds: Normal heart sounds. No murmur heard. No friction rub. No gallop. Pulmonary:      Effort: Pulmonary effort is normal. No accessory muscle usage. Breath sounds: Normal breath sounds. No decreased breath sounds, wheezing, rhonchi or rales. Abdominal:      General: Bowel sounds are normal. There is no distension. Palpations: Abdomen is soft. There is no mass. Tenderness: There is no abdominal tenderness. There is no guarding or rebound. Hernia: No hernia is present. Musculoskeletal:         General: No swelling, tenderness or deformity. Right wrist: Normal.      Left wrist: Normal.      Cervical back: Normal range of motion and neck supple. No rigidity. No muscular tenderness. Thoracic back: Spasms present. No tenderness. Lumbar back: Spasms present. No tenderness. Back:       Right lower leg: No edema. Left lower leg: No edema. Right ankle: Normal. Normal pulse. Left ankle: Normal. Normal pulse. Legs:       Comments: ? Positive SLR on left   Lymphadenopathy:      Cervical: No cervical adenopathy. Upper Body:      Right upper body: No supraclavicular adenopathy. Left upper body: No supraclavicular adenopathy. Skin:     General: Skin is warm. Capillary Refill: Capillary refill takes less than 2 seconds. Coloration: Skin is not cyanotic. Findings: No rash. Nails: There is no clubbing. Neurological:      Mental Status: She is alert and oriented to person, place, and time. Cranial Nerves: No cranial nerve deficit or dysarthria. Motor: Motor function is intact. No weakness, tremor, atrophy or abnormal muscle tone. Coordination: Coordination normal.      Gait: Gait is intact. Deep Tendon Reflexes:      Reflex Scores:       Patellar reflexes are 2+ on the right side and 2+ on the left side. Achilles reflexes are 2+ on the right side and 2+ on the left side.      Comments: CN II-XII grossly intact, speech clear, no facial droop, MAEW   Psychiatric:         Attention and Perception: Attention and perception normal.         Mood and Affect: Mood and affect normal.         Speech: Speech normal.         Behavior: Behavior normal. Behavior is cooperative. Thought Content: Thought content normal.         Cognition and Memory: Cognition and memory normal.         Judgment: Judgment normal.       Narrative   EXAMINATION: XR LUMBAR SPINE (2-3 VIEWS) 11/2/2021 11:31 AM   HISTORY: XR LUMBAR SPINE (2-3 VIEWS) 11/2/2021 10:15 AM   HISTORY: M79.18   Comparison: None    Findings:    Frontal and lateral radiographs of the lumbar spine were obtained. There is no acute fracture or subluxation. Vertebral body heights and   disc spaces are maintained. Minimal anterior hypertrophic degenerative   changes present T12-L1 level. Minimal hypertrophic spurring is present   at L5-S1. The posterior elements are normal in appearance. The   visualized osseous pelvis and soft tissues are grossly unremarkable.        Impression   Impression:    1. Minimal hypertrophic change T12-L1 and L5-S1. .   Signed by Dr Tanvi Onofre           No results found for this visit on 11/02/21. Assessment:    ICD-10-CM    1. Left buttock pain  M79.18 XR LUMBAR SPINE (2-3 VIEWS)     diclofenac (FLECTOR) 1.3 % PTCH patch     methylPREDNISolone acetate (DEPO-MEDROL) injection 40 mg     External Referral To Physical Therapy   2. Chronic left SI joint pain  M53.3 XR LUMBAR SPINE (2-3 VIEWS)    G89.29 diclofenac (FLECTOR) 1.3 % PTCH patch     methylPREDNISolone acetate (DEPO-MEDROL) injection 40 mg     External Referral To Physical Therapy   3. Lumbar radiculopathy, chronic  M54.16 XR LUMBAR SPINE (2-3 VIEWS)     methylPREDNISolone acetate (DEPO-MEDROL) injection 40 mg     External Referral To Physical Therapy   4. Facet arthritis, degenerative, L5-S1 level, lumbosacral spine  M47.817 External Referral To Physical Therapy   5.  Muscle spasm of back M62.830 diclofenac (FLECTOR) 1.3 % PTCH patch     methylPREDNISolone acetate (DEPO-MEDROL) injection 40 mg     External Referral To Physical Therapy   6. Overweight (BMI 25.0-29. 9)  E66.3        Plan:  Deloris Eckert was seen today for back pain. Diagnoses and all orders for this visit:    Left buttock pain  -     XR LUMBAR SPINE (2-3 VIEWS); Future  -     diclofenac (FLECTOR) 1.3 % PTCH patch; Place 1 patch onto the skin 2 times daily  -     methylPREDNISolone acetate (DEPO-MEDROL) injection 40 mg  -     External Referral To Physical Therapy    Chronic left SI joint pain  -     XR LUMBAR SPINE (2-3 VIEWS); Future  -     diclofenac (FLECTOR) 1.3 % PTCH patch; Place 1 patch onto the skin 2 times daily  -     methylPREDNISolone acetate (DEPO-MEDROL) injection 40 mg  -     External Referral To Physical Therapy    Lumbar radiculopathy, chronic  -     XR LUMBAR SPINE (2-3 VIEWS); Future  -     methylPREDNISolone acetate (DEPO-MEDROL) injection 40 mg  -     External Referral To Physical Therapy    Facet arthritis, degenerative, L5-S1 level, lumbosacral spine  -     External Referral To Physical Therapy    Muscle spasm of back  -     diclofenac (FLECTOR) 1.3 % PTCH patch; Place 1 patch onto the skin 2 times daily  -     methylPREDNISolone acetate (DEPO-MEDROL) injection 40 mg  -     External Referral To Physical Therapy    Overweight (BMI 25.0-29.9)    -exam concerned for sacroiliitis vs piriformis syndrome with some early arthritis in lumbar spine on xray today  -request copy of hip/pelvic xray from [facility] to review also  -depomedrol 40 mg IM today for muscle and spine inflammation and muscle spasm in back  -patient did not tolerate muscle relaxer and she prefers not to try a different one at this time  -referral to physical therapy for further evaluation and help in treatment, patient agreeable to referral  Return if symptoms worsen or fail to improve.     Over 50% of the total visit time of 30 minutes was spent on counseling and/or coordination of care of:   1. Left buttock pain    2. Chronic left SI joint pain    3. Lumbar radiculopathy, chronic    4. Facet arthritis, degenerative, L5-S1 level, lumbosacral spine    5. Muscle spasm of back    6. Overweight (BMI 25.0-29. 9)         Orders Placed This Encounter   Procedures    XR LUMBAR SPINE (2-3 VIEWS)     Standing Status:   Future     Number of Occurrences:   1     Standing Expiration Date:   11/2/2022     Order Specific Question:   Reason for exam:     Answer:   worsening Left gluteal, left SI joint pain, and lumbar radiculopathy    External Referral To Physical Therapy     Referral Priority:   Routine     Referral Type:   Eval and Treat     Referral Reason:   Specialty Services Required     Requested Specialty:   Physical Therapy     Number of Visits Requested:   1     Orders Placed This Encounter   Medications    diclofenac (FLECTOR) 1.3 % PTCH patch     Sig: Place 1 patch onto the skin 2 times daily     Dispense:  60 patch     Refill:  2    methylPREDNISolone acetate (DEPO-MEDROL) injection 40 mg     There are no discontinued medications. Patient Instructions       Patient Education        Piriformis Syndrome: Exercises  Introduction  Here are some examples of exercises for you to try. The exercises may be suggested for a condition or for rehabilitation. Start each exercise slowly. Ease off the exercises if you start to have pain. You will be told when to start these exercises and which ones will work best for you. How to do the exercises  Hip rotator stretch    1. Lie on your back with both knees bent and your feet flat on the floor. 2. Put the ankle of your affected leg on your opposite thigh near your knee. 3. Use your hand to gently push your knee (on your affected leg) away from your body until you feel a gentle stretch around your hip. 4. Hold the stretch for 15 to 30 seconds. 5. Repeat 2 to 4 times.   6. Switch legs and repeat steps 1 through 5.  Piriformis stretch    1. Lie on your back with your legs straight. 2. Lift your affected leg and bend your knee. With your opposite hand, reach across your body, and then gently pull your knee toward your opposite shoulder. 3. Hold the stretch for 15 to 30 seconds. 4. Repeat with your other leg. 5. Repeat 2 to 4 times on each side. Lower abdominal strengthening    1. Lie on your back with your knees bent and your feet flat on the floor. 2. Tighten your belly muscles by pulling your belly button in toward your spine. 3. Lift one foot off the floor and bring your knee toward your chest, so that your knee is straight above your hip and your leg is bent like the letter \"L. \"  4. Lift the other knee up to the same position. 5. Lower one leg at a time to the starting position. 6. Keep alternating legs until you have lifted each leg 8 to 12 times. 7. Be sure to keep your belly muscles tight and your back still as you are moving your legs. Be sure to breathe normally. Follow-up care is a key part of your treatment and safety. Be sure to make and go to all appointments, and call your doctor if you are having problems. It's also a good idea to know your test results and keep a list of the medicines you take. Current as of: July 1, 2021               Content Version: 13.0  © 2006-2021 Healthwise, Incorporated. Care instructions adapted under license by [facility] ([facility] [place]). If you have questions about a medical condition or this instruction, always ask your healthcare professional. [place] [address] any warranty or liability for your use of this information. Patient Education        Sacroiliac Pain: Exercises  Introduction  Here are some examples of exercises for you to try. The exercises may be suggested for a condition or for rehabilitation. Start each exercise slowly. Ease off the exercises if you start to have pain.   You will be told when to start these exercises and which ones will work best for you. How to do the exercises  Knee-to-chest stretch    1. Do not do the knee-to-chest exercise if it causes or increases back or leg pain. 2. Lie on your back with your knees bent and your feet flat on the floor. You can put a small pillow under your head and neck if it is more comfortable. 3. Grasp your hands under one knee and bring the knee to your chest, keeping the other foot flat on the floor. 4. Keep your lower back pressed to the floor. Hold for at least 15 to 30 seconds. 5. Relax and lower the knee to the starting position. Repeat with the other leg. 6. Repeat 2 to 4 times with each leg. 7. To get more stretch, keep your other leg flat on the floor while pulling your knee to your chest.  Bridging    1. Lie on your back with both knees bent. Your knees should be bent about 90 degrees. 2. Tighten your belly muscles by pulling in your belly button toward your spine. Then push your feet into the floor, squeeze your buttocks, and lift your hips off the floor until your shoulders, hips, and knees are all in a straight line. 3. Hold for about 6 seconds as you continue to breathe normally, and then slowly lower your hips back down to the floor and rest for up to 10 seconds. 4. Repeat 8 to 12 times. Hip extension    1. Get down on your hands and knees on the floor. 2. Keeping your back and neck straight, lift one leg straight out behind you. When you lift your leg, keep your hips level. Don't let your back twist, and don't let your hip drop toward the floor. 3. Hold for 6 seconds. Repeat 8 to 12 times with each leg. 4. If you feel steady and strong when you do this exercise, you can make it more difficult. To do this, when you lift your leg, also lift the opposite arm straight out in front of you. For example, lift the left leg and the right arm at the same time. (This is sometimes called the \"bird dog exercise. \") Hold for 6 seconds, and repeat 8 to 12 times on each side. [place]    1.  lAiza Williamson on your side with a pillow under your head. Keep your feet and knees together and your knees bent. 2. Raise your top knee, but keep your feet together. Do not let your hips roll back. Your legs should open up like a clamshell. 3. Hold for 6 seconds. 4. Slowly lower your knee back down. Rest for 10 seconds. 5. Repeat 8 to 12 times. 6. Switch to your other side and repeat steps 1 through 5. Hamstring wall stretch    1. Lie on your back in a doorway, with one leg through the open door. 2. Slide your affected leg up the wall to straighten your knee. You should feel a gentle stretch down the back of your leg. 3. Hold the stretch for at least 1 minute to begin. Then try to lengthen the time you hold the stretch to as long as 6 minutes. 4. Switch legs, and repeat steps 1 through 3.  5. Repeat 2 to 4 times. 6. If you do not have a place to do this exercise in a doorway, there is another way to do it:  7. Lie on your back, and bend one knee. 8. Loop a towel under the ball and toes of that foot, and hold the ends of the towel in your hands. 9. Straighten your knee, and slowly pull back on the towel. You should feel a gentle stretch down the back of your leg. 10. Switch legs, and repeat steps 1 through 3.  11. Repeat 2 to 4 times. 1. Do not arch your back. 2. Do not bend either knee. 3. Keep one heel touching the floor and the other heel touching the wall. Do not point your toes. Lower abdominal strengthening    1. Lie on your back with your knees bent and your feet flat on the floor. 2. Tighten your belly muscles by pulling your belly button in toward your spine. 3. Lift one foot off the floor and bring your knee toward your chest, so that your knee is straight above your hip and your leg is bent like the letter \"L. \"  4. Lift the other knee up to the same position. 5. Lower one leg at a time to the starting position. 6. Keep alternating legs until you have lifted each leg 8 to 12 times.   7. Be sure to pain at first, but it delays healing. Continued rest without activity is usually not good for your back.     · If you must sit for long periods of time, take breaks from sitting. Change positions every 30 minutes. Get up and walk around, or lie in a comfortable position. Activity    · Take short walks several times a day. You can start with 5 to 10 minutes, 3 or 4 times a day, and work up to longer walks. Walk on level surfaces and avoid hills and stairs until your back starts to feel better.     · After your back spasm starts to feel better, try to stretch your muscles every day, especially before and after exercise and at bedtime. Regular stretching can help relax your muscles.     · To prevent future back pain, do exercises to stretch and strengthen your back and stomach. Learn to use good posture, safe lifting techniques, and other ways to move to help you avoid back pain. When should you call for help? Call 911 anytime you think you may need emergency care. For example, call if:    · You are unable to move an arm or a leg at all. Call your doctor now or seek immediate medical care if:    · You have new or worse symptoms in your legs, belly, or buttocks. Symptoms may include:  ? Numbness or tingling. ? Weakness. ? Pain.     · You lose bladder or bowel control. Watch closely for changes in your health, and be sure to contact your doctor if:    · You have a fever, lose weight, or don't feel well.     · You do not get better as expected. Where can you learn more? Go to https://[employer]. org and sign in to your [employer] account. Enter E232 in the [facility] box to learn more about \"Back Spasm: Care Instructions. \"     If you do not have an account, please click on the \"Sign Up Now\" link. Current as of: July 1, 2021               Content Version: 13.0  © 6320-7945 Healthwise, Incorporated. Care instructions adapted under license by [facility] ([facility]).  If you have questions about a medical condition or this instruction, always ask your healthcare professional. [address] any warranty or liability for your use of this information. Patient voices understanding and agrees to plans along with risks and benefits of plan. Counseling:  Aubree Ruiz's case, medications and options were discussed in detail. patient was instructed tocall the office if she   questions regarding her treatment. Should her conditions worsen, she should return to office to be reassessed by Dr. Jareth Saunders. she  Should to go the closest Emergency Department for any emergency. They verbalized understanding the above instructions.

## 2021-11-02 NOTE — PATIENT INSTRUCTIONS
Patient Education        Piriformis Syndrome: Exercises  Introduction  Here are some examples of exercises for you to try. The exercises may be suggested for a condition or for rehabilitation. Start each exercise slowly. Ease off the exercises if you start to have pain. You will be told when to start these exercises and which ones will work best for you. How to do the exercises  Hip rotator stretch    1. Lie on your back with both knees bent and your feet flat on the floor. 2. Put the ankle of your affected leg on your opposite thigh near your knee. 3. Use your hand to gently push your knee (on your affected leg) away from your body until you feel a gentle stretch around your hip. 4. Hold the stretch for 15 to 30 seconds. 5. Repeat 2 to 4 times. 6. Switch legs and repeat steps 1 through 5. Piriformis stretch    1. Lie on your back with your legs straight. 2. Lift your affected leg and bend your knee. With your opposite hand, reach across your body, and then gently pull your knee toward your opposite shoulder. 3. Hold the stretch for 15 to 30 seconds. 4. Repeat with your other leg. 5. Repeat 2 to 4 times on each side. Lower abdominal strengthening    1. Lie on your back with your knees bent and your feet flat on the floor. 2. Tighten your belly muscles by pulling your belly button in toward your spine. 3. Lift one foot off the floor and bring your knee toward your chest, so that your knee is straight above your hip and your leg is bent like the letter \"L. \"  4. Lift the other knee up to the same position. 5. Lower one leg at a time to the starting position. 6. Keep alternating legs until you have lifted each leg 8 to 12 times. 7. Be sure to keep your belly muscles tight and your back still as you are moving your legs. Be sure to breathe normally. Follow-up care is a key part of your treatment and safety. Be sure to make and go to all appointments, and call your doctor if you are having problems. lower your hips back down to the floor and rest for up to 10 seconds. 4. Repeat 8 to 12 times. Hip extension    1. Get down on your hands and knees on the floor. 2. Keeping your back and neck straight, lift one leg straight out behind you. When you lift your leg, keep your hips level. Don't let your back twist, and don't let your hip drop toward the floor. 3. Hold for 6 seconds. Repeat 8 to 12 times with each leg. 4. If you feel steady and strong when you do this exercise, you can make it more difficult. To do this, when you lift your leg, also lift the opposite arm straight out in front of you. For example, lift the left leg and the right arm at the same time. (This is sometimes called the \"bird dog exercise. \") Hold for 6 seconds, and repeat 8 to 12 times on each side. Clamshell    1. Lie on your side with a pillow under your head. Keep your feet and knees together and your knees bent. 2. Raise your top knee, but keep your feet together. Do not let your hips roll back. Your legs should open up like a clamshell. 3. Hold for 6 seconds. 4. Slowly lower your knee back down. Rest for 10 seconds. 5. Repeat 8 to 12 times. 6. Switch to your other side and repeat steps 1 through 5. Hamstring wall stretch    1. Lie on your back in a doorway, with one leg through the open door. 2. Slide your affected leg up the wall to straighten your knee. You should feel a gentle stretch down the back of your leg. 3. Hold the stretch for at least 1 minute to begin. Then try to lengthen the time you hold the stretch to as long as 6 minutes. 4. Switch legs, and repeat steps 1 through 3.  5. Repeat 2 to 4 times. 6. If you do not have a place to do this exercise in a doorway, there is another way to do it:  7. Lie on your back, and bend one knee. 8. Loop a towel under the ball and toes of that foot, and hold the ends of the towel in your hands. 9. Straighten your knee, and slowly pull back on the towel.  You should feel a gentle stretch down the back of your leg. 10. Switch legs, and repeat steps 1 through 3.  11. Repeat 2 to 4 times. 1. Do not arch your back. 2. Do not bend either knee. 3. Keep one heel touching the floor and the other heel touching the wall. Do not point your toes. Lower abdominal strengthening    1. Lie on your back with your knees bent and your feet flat on the floor. 2. Tighten your belly muscles by pulling your belly button in toward your spine. 3. Lift one foot off the floor and bring your knee toward your chest, so that your knee is straight above your hip and your leg is bent like the letter \"L. \"  4. Lift the other knee up to the same position. 5. Lower one leg at a time to the starting position. 6. Keep alternating legs until you have lifted each leg 8 to 12 times. 7. Be sure to keep your belly muscles tight and your back still as you are moving your legs. Be sure to breathe normally. Piriformis stretch    1. Lie on your back with your legs straight. 2. Lift your affected leg, and bend your knee. With your opposite hand, reach across your body, and then gently pull your knee toward your opposite shoulder. 3. Hold the stretch for 15 to 30 seconds. 4. Switch legs and repeat steps 1 through 3.  5. Repeat 2 to 4 times. Follow-up care is a key part of your treatment and safety. Be sure to make and go to all appointments, and call your doctor if you are having problems. It's also a good idea to know your test results and keep a list of the medicines you take. Where can you learn more? Go to https://Flextripjessicaeb.WeatherBug. org and sign in to your [email or web address] account. Enter R491 in the FabZat box to learn more about \"Sacroiliac Pain: Exercises. \"     If you do not have an account, please click on the \"Sign Up Now\" link. Current as of: July 1, 2021               Content Version: 13.0  © 9994-2447 Healthwise, Incorporated. Care instructions adapted under license by [facility] ([place] [facility]). ibuprofen or naproxen seem to work best. But if you can't take NSAIDs you can try acetaminophen. Your doctor can prescribe stronger medicines if needed. Be safe with medicines. Read and follow all instructions on the label. Body positions and posture    · Sit or lie in positions that are most comfortable for you and that reduce pain. Try one of these positions when you lie down:  ? Lie on your back with your knees bent and supported by large pillows. ? Lie on the floor with your legs on the seat of a sofa or chair. ? Lie on your side with your knees and hips bent and a pillow between your legs. ? Lie on your stomach if it does not make pain worse.     · Do not sit up in bed. Avoid soft couches and twisted positions.     · Avoid bed rest after the first day of back pain. Bed rest can help relieve pain at first, but it delays healing. Continued rest without activity is usually not good for your back.     · If you must sit for long periods of time, take breaks from sitting. Change positions every 30 minutes. Get up and walk around, or lie in a comfortable position. Activity    · Take short walks several times a day. You can start with 5 to 10 minutes, 3 or 4 times a day, and work up to longer walks. Walk on level surfaces and avoid hills and stairs until your back starts to feel better.     · After your back spasm starts to feel better, try to stretch your muscles every day, especially before and after exercise and at bedtime. Regular stretching can help relax your muscles.     · To prevent future back pain, do exercises to stretch and strengthen your back and stomach. Learn to use good posture, safe lifting techniques, and other ways to move to help you avoid back pain. When should you call for help? Call 911 anytime you think you may need emergency care. For example, call if:    · You are unable to move an arm or a leg at all.    Call your doctor now or seek immediate medical care if:    · You have new or worse symptoms in your legs, belly, or buttocks. Symptoms may include:  ? Numbness or tingling. ? Weakness. ? Pain.     · You lose bladder or bowel control. Watch closely for changes in your health, and be sure to contact your doctor if:    · You have a fever, lose weight, or don't feel well.     · You do not get better as expected. Where can you learn more? Go to https://[employer] Reasoning[employer] Security Systems Solutionseb.[employer]. org and sign in to your Ar[employer] account. Enter E232 in the Art Sumo box to learn more about \"Back Spasm: Care Instructions. \"     If you do not have an account, please click on the \"Sign Up Now\" link. Current as of: July 1, 2021               Content Version: 13.0  © 2006-2021 Healthwise, Incorporated. Care instructions adapted under license by [facility] ([facility]). If you have questions about a medical condition or this instruction, always ask your healthcare professional. [place] [address] any warranty or liability for your use of this information.

## 2021-12-16 ENCOUNTER — TELEMEDICINE (OUTPATIENT)
Dept: FAMILY MEDICINE CLINIC | Age: 41
End: 2021-12-16
Payer: OTHER GOVERNMENT

## 2021-12-16 DIAGNOSIS — Z00.00 ANNUAL PHYSICAL EXAM: ICD-10-CM

## 2021-12-16 DIAGNOSIS — E55.9 VITAMIN D DEFICIENCY: ICD-10-CM

## 2021-12-16 DIAGNOSIS — E03.9 ACQUIRED HYPOTHYROIDISM: Primary | ICD-10-CM

## 2021-12-16 DIAGNOSIS — U07.1 COVID-19 VIRUS INFECTION: ICD-10-CM

## 2021-12-16 DIAGNOSIS — F41.1 GAD (GENERALIZED ANXIETY DISORDER): ICD-10-CM

## 2021-12-16 DIAGNOSIS — E66.3 OVERWEIGHT (BMI 25.0-29.9): ICD-10-CM

## 2021-12-16 PROCEDURE — 99214 OFFICE O/P EST MOD 30 MIN: CPT | Performed by: INTERNAL MEDICINE

## 2021-12-16 ASSESSMENT — ENCOUNTER SYMPTOMS
BLOOD IN STOOL: 0
EYE DISCHARGE: 0
COUGH: 1
CHEST TIGHTNESS: 0
EYE REDNESS: 0
SINUS PRESSURE: 0
RHINORRHEA: 1
ABDOMINAL PAIN: 0
DIARRHEA: 0
VOICE CHANGE: 0
SHORTNESS OF BREATH: 0
VOMITING: 0
EYE PAIN: 0
WHEEZING: 0
SORE THROAT: 0
COLOR CHANGE: 0

## 2021-12-16 NOTE — PATIENT INSTRUCTIONS
Patient Education        10 Things to Do When You Have COVID-19    Stay home. Don't go to school, work, or public areas. And don't use public transportation, ride-shares, or taxis unless you have no choice. Leave your home only if you need to get medical care. But call the doctor's office first so they know you're coming. And wear a mask. Ask before leaving isolation. Follow your doctor's advice about when it is safe for you to leave isolation. Wear a mask when you are around other people. It can help stop the spread of the virus. Limit contact with people in your home. If possible, stay in a separate bedroom and use a separate bathroom. Avoid contact with pets and other animals. If possible, have a friend or family member care for them while you're sick. Cover your mouth and nose with a tissue when you cough or sneeze. Then throw the tissue in the trash right away. Wash your hands often, especially after you cough or sneeze. Use soap and water, and scrub for at least 20 seconds. If soap and water aren't available, use an alcohol-based hand . Don't share personal household items. These include bedding, towels, cups and glasses, and eating utensils. Clean and disinfect your home every day. Use household  or disinfectant wipes or sprays. If needed, take acetaminophen (Tylenol) or ibuprofen (Advil, Motrin) to relieve fever and body aches. Read and follow all instructions on the label. Current as of: March 26, 2021               Content Version: 13.0  © 2006-2021 Healthwise, [employer]. Care instructions adapted under license by [facility] ([facility]). If you have questions about a medical condition or this instruction, always ask your healthcare professional. [place] [address] any warranty or liability for your use of this information.        Patient Education        Coronavirus ([id number]): Care Instructions  Overview  The coronavirus disease (COVID-19) is caused by a virus. Symptoms may include a fever, a cough, and shortness of breath. It can spread through droplets from coughing and sneezing, breathing, and singing. The virus also can spread when people are in close contact with someone who is infected. Most people have mild symptoms and can take care of themselves at home. If their symptoms get worse, they may need care in a hospital. Treatment may include medicines to reduce symptoms, plus breathing support such as oxygen therapy or a ventilator. It's important to not spread the virus to others. If you have COVID-19, wear a mask anytime you are around other people. It can help stop the spread of the virus. You need to isolate yourself while you are sick. Leave your home only if you need to get medical care or testing. Follow-up care is a key part of your treatment and safety. Be sure to make and go to all appointments, and call your doctor if you are having problems. It's also a good idea to know your test results and keep a list of the medicines you take. How can you care for yourself at home? · Get extra rest. It can help you feel better. · Drink plenty of fluids. This helps replace fluids lost from fever. Fluids may also help ease a scratchy throat. · You can take acetaminophen (Tylenol) or ibuprofen (Advil, Motrin) to reduce a fever. It may also help with muscle and body aches. Read and follow all instructions on the label. · Use petroleum jelly on sore skin. This can help if the skin around your nose and lips becomes sore from rubbing a lot with tissues. If you use oxygen, use a water-based product instead of petroleum jelly. · Keep track of symptoms such as fever and shortness of breath. This can help you know if you need to call your doctor. It can also help you know when it's safe to be around other people. · In some cases, your doctor might suggest that you get a pulse oximeter.   How can you self-isolate when you have COVID-19? If you have COVID-19, there are things you can do to help avoid spreading the virus to others. · Limit contact with people in your home. If possible, stay in a separate bedroom and use a separate bathroom. · Wear a mask when you are around other people. · If you have to leave home, avoid crowds and try to stay at least 6 feet away from other people. · Avoid contact with pets and other animals. · Cover your mouth and nose with a tissue when you cough or sneeze. Then throw it in the trash right away. · Wash your hands often, especially after you cough or sneeze. Use soap and water, and scrub for at least 20 seconds. If soap and water aren't available, use an alcohol-based hand . · Don't share personal household items. These include bedding, towels, cups and glasses, and eating utensils. · [address] in the warmest water allowed for the fabric type, and dry it completely. It's okay to wash other people's laundry with yours. · Clean and disinfect your home. Use household  and disinfectant wipes or sprays. When can you end self-isolation for COVID-19? If you know or think that you have the virus, you will need to self-isolate. You can be around others after:  · It's been at least 10 days since your symptoms started and  · You haven't had a fever for 24 hours without taking medicines to lower the fever and  · Your symptoms are improving. If you tested positive but have no symptoms, you can end isolation after 10 days. But if you start to have symptoms, follow the steps above. Ask your doctor if you need to be tested before you end isolation. This is especially important if you have a weakened immune system. When should you call for help? Call 911 anytime you think you may need emergency care. For example, call if you have life-threatening symptoms, such as:    · You have severe trouble breathing.  (You can't talk at all.)     · You have constant chest pain or pressure.     · You are severely dizzy or lightheaded.     · You are confused or can't think clearly.     · You have pale, gray, or blue-colored skin or lips.     · You pass out (lose consciousness) or are very hard to wake up. Call your doctor now or seek immediate medical care if:    · You have moderate trouble breathing. (You can't speak a full sentence.)     · You are coughing up blood (more than about 1 teaspoon).     · You have signs of low blood pressure. These include feeling lightheaded; being too weak to stand; and having cold, pale, clammy skin. Watch closely for changes in your health, and be sure to contact your doctor if:    · Your symptoms get worse.     · You are not getting better as expected.     · You have new or worse symptoms of anxiety, depression, nightmares, or flashbacks. Call before you go to the doctor's office. Follow their instructions. And wear a mask. Current as of: July 1, 2021               Content Version: 13.0  © 2006-2021 [employer]. Care instructions adapted under license by [facility] ([facility]). If you have questions about a medical condition or this instruction, always ask your healthcare professional. [place] [address] any warranty or liability for your use of this information. Patient Education        Learning About Generalized Anxiety Disorder  What is generalized anxiety disorder? We all worry. It's a normal part of life. But when you have generalized anxiety disorder, you worry about lots of things and have a hard time stopping your worry. This worry or anxiety interferes with your relationships, work, and life. What causes it? The cause of generalized anxiety disorder is not known. Some studies show that it might be passed down through families. Several things can cause symptoms of anxiety. They include some health problems, some medicines, too much caffeine, and illegal drugs such as cocaine. What are the symptoms?   Generalized anxiety disorder can make you feel worried and stressed about many things almost every day. You may have a hard time controlling your worry. Symptoms include:  · Feeling tired or cranky. You may have a hard time concentrating. · Having headaches or muscle aches. · Feeling shaky, sweating, or having hot flashes. · Feeling lightheaded, sick to your stomach, or out of breath. · Feeling like you can't relax. Or being startled easily. · Having problems falling or staying asleep. How is it diagnosed? Your doctor will ask about your health and how often you worry or feel anxious. People with generalized anxiety disorder have more worry and stress than normal. They feel worried and stressed about many things almost every day. And these feelings have lasted for at least 6 months. Your doctor also may ask about other symptoms, like whether you:  · Feel restless. · Feel tired. · Have a hard time thinking or feel that your mind goes blank. · Feel cranky. · Have tense muscles. · Have sleep problems. A physical exam and tests can help make sure that your symptoms aren't caused by a different condition, such as a thyroid problem. How is it treated? Counseling and medicine can both work to treat anxiety. The two are often used along with lifestyle changes. Cognitive-behavioral therapy (CBT) is a type of counseling that's used to help treat anxiety. In CBT, you learn how to notice and replace thoughts that make you feel worried. It also can help you learn how to relax when you worry. Applied relaxation therapy may also be used. Your counselor might ask you to imagine a calming situation. This can help you relax. Medicines can help. These medicines are often also used for depression. Selective serotonin reuptake inhibitors (SSRIs) are often tried first. But there are other medicines that your doctor may use. You may need to try a few medicines to find one that works well.   Many people feel better by getting regular exercise, eating healthy meals, and getting good sleep. Mindfulness--focusing on things in the present moment--also can help reduce your anxiety. What can you expect when you have it? Having anxiety can be upsetting. Some people might feel less worried and stressed after a couple of months of treatment. But for other people, it might take longer to feel better. Reaching out to people for help is important. Try not to isolate yourself. Let your family and friends help you. Find someone you can trust and confide in. Talk to that person. When you know what anxiety is--and how you can get help for it--you can start to learn new ways of thinking. This can help you cope and work through your anxiety. Follow-up care is a key part of your treatment and safety. Be sure to make and go to all appointments, and call your doctor if you are having problems. It's also a good idea to know your test results and keep a list of the medicines you take. Where can you learn more? Go to https://FireDrillMe.[employer]. org and sign in to your [employer] account. Enter G110 in the [employer] box to learn more about \"Learning About Generalized Anxiety Disorder. \"     If you do not have an account, please click on the \"Sign Up Now\" link. Current as of: June 16, 2021               Content Version: 13.0  © 6359-9864 Healthwise, Incorporated. Care instructions adapted under license by [facility] ([facility]). If you have questions about a medical condition or this instruction, always ask your healthcare professional. [place] [address] any warranty or liability for your use of this information. Patient Education        When You Are Overweight: Care Instructions  Your Care Instructions     If you're overweight, your doctor may recommend that you make changes in your eating and exercise habits.  Being overweight can lead to serious health problems, such as high blood pressure, heart disease, type 2 diabetes, and arthritis, or it can make these problems worse. Eating a healthy diet and being more active can help you reach and stay at a healthy weight. You don't have to make huge changes all at once. Start by making small changes in your eating and exercise habits. To lose weight, you need to burn more calories than you take in. You can do this by eating healthy foods in reasonable amounts and becoming more active every day. Follow-up care is a key part of your treatment and safety. Be sure to make and go to all appointments, and call your doctor if you are having problems. It's also a good idea to know your test results and keep a list of the medicines you take. How can you care for yourself at home? · Improve your eating habits. You'll be more successful if you work on changing one eating habit at a time. All foods, if eaten in moderation, can be part of healthy eating. Remember to:  ? Eat a variety of foods from each food group. Include grains, vegetables, fruits, dairy, and protein foods. ? Limit foods high in fat, sugar, and calories. ? Eat slowly. And don't do anything else, such as watch TV, while you are eating. ? Pay attention to portion sizes. Put your food on a smaller plate. ? Plan your meals ahead of time. You'll be less likely to grab something that's not as healthy. · Get active. Regular activity can help you feel better, have more energy, and burn more calories. If you haven't been active, start slowly. Start with at least 30 minutes of moderate activity on most days of the week. Then gradually increase the amount of activity. Try for 60 or 90 minutes a day, at least 5 days a week. There are a lot of ways to fit activity into your life. You can:  ? Walk or bike to the store. Or walk with a friend, or walk the dog.  ? Mow the lawn, rake leaves, shovel snow, or do some gardening. ? Use the stairs instead of the elevator, at least for a few floors. · Change your thinking.  Your thoughts have a lot to do with how you feel and what you do. When you're trying to reach a healthy weight, changing how you think about certain things may help. Here are some ideas:  ? Don't compare yourself to others. Healthy bodies come in all shapes and sizes. ? Pay attention to how hungry or full you feel. When you eat, be aware of why you're eating and how much you're eating. ? Focus on improving your health instead of dieting. Dieting almost never works over the long term. · Ask your doctor about other health professionals who can help you reach a healthy weight. ? A dietitian can help you make healthy changes in your diet. ? An exercise specialist or  can help you develop a safe and effective exercise program.  ? A counselor or psychiatrist can help you cope with issues such as depression, anxiety, or family problems that can make it hard to focus on reaching a healthy weight. · Get support from your family, your doctor, your friends, a support group--and support yourself. Where can you learn more? Go to https://[employer]. org and sign in to your [employer] account. Enter T539 in the [employer] box to learn more about \"When You Are Overweight: Care Instructions. \"     If you do not have an account, please click on the \"Sign Up Now\" link. Current as of: March 17, 2021               Content Version: 13.0  © 6559-5078 Healthwise, Incorporated. Care instructions adapted under license by [facility] ([facility]). If you have questions about a medical condition or this instruction, always ask your healthcare professional. [address] any warranty or liability for your use of this information.

## 2021-12-16 NOTE — PROGRESS NOTES
2021    Chief Complaint   Patient presents with    Positive For Covid-19    Anxiety     follow up        TELEHEALTH EVALUATION -- Audio/Visual (During JHDUY-04 public health emergency)    HPI:    Eliud Valadez (:  1980) has requested an audio/video evaluation for the following concern(s):  1. Location of patient - Home  2. Location of physician - Office  3. Other individuals on this call - no    37 y.o. female being seen for Positive For Covid-19 and Anxiety (follow up)     Covid-19 positive on 21 with symptoms starting 21. +nasal congestion, clear rhinorrhea, mild cough, fatigue, and loss of sense of smell. Initially had fever but none x5 days. No CP or SOA. Daughter Mylene Nicolas also sick with covid-19 but symptoms mild so far. Patient not immunized against Covid-19. Generalized anxiety disorder-well controlled currently with desvenlafaxine 25 mg daily and buspirinone prn. Hypothyroidism  Patient presents for follow up on thyroid function. Symptoms consist of fatigue. The problem has been stable. Patient has been compliant with levothyroxine. Labs not done before appointment due to diagnosis of Covid-19 and in quarantine since last week. Review of Systems   Constitutional: Positive for activity change, appetite change and fatigue. Negative for chills and fever. HENT: Positive for congestion, postnasal drip and rhinorrhea. Negative for ear pain, sinus pressure, sore throat and voice change. +loss of sense of smell   Eyes: Negative for pain, discharge and redness. Respiratory: Positive for cough. Negative for chest tightness, shortness of breath and wheezing. Cardiovascular: Negative for chest pain and palpitations. Gastrointestinal: Negative for abdominal pain, blood in stool, diarrhea and vomiting. Endocrine: Negative for cold intolerance, heat intolerance and polydipsia. Genitourinary: Negative for dysuria and hematuria.    Musculoskeletal: Negative for arthralgias, myalgias, neck pain and neck stiffness. Skin: Negative for color change and rash. Neurological: Negative for dizziness, tremors, syncope, speech difficulty, weakness, numbness and headaches. Hematological: Negative for adenopathy. Does not bruise/bleed easily. Psychiatric/Behavioral: Negative for confusion, dysphoric mood, self-injury, sleep disturbance and suicidal ideas. The patient is nervous/anxious. All other systems reviewed and are negative. Prior to Visit Medications    Medication Sig Taking?  Authorizing Provider   diclofenac (FLECTOR) 1.3 % PTCH patch Place 1 patch onto the skin 2 times daily  Andre Anguiano MD   levothyroxine (SYNTHROID) 75 MCG tablet Take 1 tablet by mouth daily  Andre Anguiano MD   desvenlafaxine succinate (PRISTIQ) 25 MG TB24 extended release tablet TAKE ONE TABLET DAILY GENERIC FOR PRISTIQ   Andre Angiuano MD   busPIRone (BUSPAR) 5 MG tablet TAKE ONE (1) TO TWO (2) TABLETS EVERY EIGHT HOURS AS NEEDED ANXIETY AND PANIC GENERIC FOR BUSPAR   Andre Anguiano MD   vitamin D (CHOLECALCIFEROL) 50 MCG (2000 UT) TABS tablet Take 1 tablet by mouth daily  Andre Anguiano MD   esomeprazole (NEXIUM 24HR) 20 MG delayed release capsule Take 1 capsule by mouth daily as needed (stomach pain/reflux)  Andre Anguiano MD       Social History     Tobacco Use    Smoking status: Light Tobacco Smoker     Packs/day: 0.25     Years: 20.00     Pack years: 5.00     Types: Cigarettes    Smokeless tobacco: Never Used    Tobacco comment: 4 cigarettes per day   Vaping Use    Vaping Use: Never used   Substance Use Topics    Alcohol use: Yes     Comment: 2 drinks per week    Drug use: No        Past Medical History:   Diagnosis Date    Adjustment disorder with mixed anxiety and depressed mood     Allergic rhinitis, cause unspecified     Anxiety state, unspecified     Facet arthritis, degenerative, L5-S1 level, lumbosacral spine 11/2/2021    Palpitations     Paroxysmal SVT (supraventricular tachycardia) (HCC) 09/03/2014    Radial styloid tenosynovitis     wrist pain while breast feeding. Past Surgical History:   Procedure Laterality Date    WISDOM TOOTH EXTRACTION          No Known Allergies     PHYSICAL EXAMINATION:  [ INSTRUCTIONS:  \"[x]\" Indicates a positive item  \"[]\" Indicates a negative item  -- DELETE ALL ITEMS NOT EXAMINED]  Vital Signs: (As obtained by patient/caregiver or practitioner observation)    Patient-Reported Vitals 12/16/2021 10/11/2021 3/8/2021   Patient-Reported Weight 169 lbs 169 lbs 175 lbs   Patient-Reported Height 5'6\" 5'6\" 5'6\"   Patient-Reported Temperature 97F 97F 98F         Constitutional: [x] Appears well-developed and well-nourished [x] Slightly ill appearing/appears tired but in No apparent distress      [] Abnormal-   Mental status  [x] Alert and awake  [x] Oriented to person/place/time [x]Able to follow commands      Eyes:  EOM    [x]  Normal  [] Abnormal-  Sclera  [x]  Normal  [] Abnormal -         Discharge [x]  None visible  [] Abnormal -    HENT:   [x] Normocephalic, atraumatic.   [] Abnormal   [x] Mouth/Throat: Mucous membranes are moist.     External Ears [x] Normal  [] Abnormal-     Neck: [x] No visualized mass     Pulmonary/Chest: [x] Respiratory effort normal.  [x] No visualized signs of difficulty breathing or respiratory distress        [] Abnormal-      Musculoskeletal:   [x] No joint or extremity swelling         [x] Normal range of motion of neck        [] Abnormal-       Neurological:        [x] No Facial Asymmetry (Cranial nerve 7 motor function) (limited exam to video visit)          [x] No gaze palsy, speech clear, no facial droop, MAEW       [] Abnormal-         Skin:        [x] No significant exanthematous lesions or discoloration noted on facial skin         [] Abnormal-            Psychiatric:       [x] Normal mood and Affect [x] No Hallucinations        [] Abnormal- Other pertinent observable physical exam findings-     Due to this being a TeleHealth encounter, evaluation of the following organ systems is limited: Vitals/Constitutional/EENT/Resp/CV/GI//MS/Neuro/Skin/Heme-Lymph-Imm. ASSESSMENT/PLAN:  Mateus Hernandez was seen today for positive for covid-19 and anxiety. Diagnoses and all orders for this visit:    Acquired hypothyroidism  -     TSH WITH REFLEX TO FT4; Future    PREM (generalized anxiety disorder)    Vitamin D deficiency  -     Vitamin D 25 Hydroxy; Future    COVID-19 virus infection  Comments:  Symptoms started 12/8/21    Overweight (BMI 25.0-29. 9)  Comments:  BMI 27.3    Annual physical exam  -     CBC Auto Differential; Future  -     Comprehensive Metabolic Panel; Future  -     Lipid Panel; Future    No new labs today. Previous labs reviewed. Requested patient get lab work drawn as ordered.   -Hypothyroidism well controlled with current dose of levothyroxine which was continued today. Rescheduled Lab work due to patient in quarantine.   -Generalized Anxiety Disorder well controlled with desvenlafaxine 25 mg daily and buspirinone prn.  Encouraged Exercise and relaxation techniques for stress relief.    -Covid-19 infection-  Discussed usual treatment for mild symptoms of COVID-19 upper respiratory infection which is usually symptomatic treatment including recommended over-the-counter Mucinex DM to help with cough and expectorant and prescription for Tessalon Perles to use with the Mucinex DM as needed for cough was also sent  -Encouraged fluids and rest and frequent clearing of chest with coughing to prevent mucus buildup in chest  -Tylenol or ibuprofen as needed for headache, sore throat, or fever if it develops  -Quarantine for 14 days from symptom onset  -Monitor O2 sats and if O2 sat consistently 90% or lower or if significant shortness of breath develops, patient needs to be reevaluated urgently either in the ER if moderate to severe symptoms or via primary care physician if mild to moderate symptoms not requiring ER care  -Azithromycin and steroids not clinically indicated   -Also discussed that immunization for COVID-19 still potentially beneficial after recovery from current illness in patients who were unvaccinated at the time of infection  -Symptoms must be improving for 24-48 hours and patient fever free for >24 hours before return to work on 12/19/21    Return in about 3 months (around 3/16/2022) for ECPE, recheck mood, thyroid. An  electronic signature was used to authenticate this note. --Beni Jacinto MD on 12/16/2021 at 12:15 PM        Pursuant to the emergency declaration under the [facility] [address] waiver authority and the [employer] and [employer] Act, this Virtual  Visit was conducted, with patient's consent, to reduce the patient's risk of exposure to COVID-19 and provide continuity of care for an established patient. Services were provided through a video synchronous discussion virtually to substitute for in-person clinic visit.

## 2022-01-10 DIAGNOSIS — E03.9 ACQUIRED HYPOTHYROIDISM: ICD-10-CM

## 2022-01-10 RX ORDER — LEVOTHYROXINE SODIUM 0.07 MG/1
75 TABLET ORAL DAILY
Qty: 30 TABLET | Refills: 3 | Status: SHIPPED | OUTPATIENT
Start: 2022-01-10 | End: 2022-05-19

## 2022-02-14 DIAGNOSIS — E55.9 VITAMIN D DEFICIENCY: Primary | ICD-10-CM

## 2022-02-14 DIAGNOSIS — E03.9 ACQUIRED HYPOTHYROIDISM: ICD-10-CM

## 2022-02-14 DIAGNOSIS — N92.1 MENORRHAGIA WITH IRREGULAR CYCLE: ICD-10-CM

## 2022-02-14 DIAGNOSIS — Z00.00 ANNUAL PHYSICAL EXAM: ICD-10-CM

## 2022-03-15 DIAGNOSIS — N92.1 MENORRHAGIA WITH IRREGULAR CYCLE: ICD-10-CM

## 2022-03-15 DIAGNOSIS — E03.9 ACQUIRED HYPOTHYROIDISM: ICD-10-CM

## 2022-03-15 DIAGNOSIS — E55.9 VITAMIN D DEFICIENCY: ICD-10-CM

## 2022-03-15 DIAGNOSIS — Z00.00 ANNUAL PHYSICAL EXAM: ICD-10-CM

## 2022-03-15 LAB
ALBUMIN SERPL-MCNC: 4.4 G/DL (ref 3.5–5.2)
ALP BLD-CCNC: 69 U/L (ref 35–104)
ALT SERPL-CCNC: 14 U/L (ref 5–33)
ANION GAP SERPL CALCULATED.3IONS-SCNC: 10 MMOL/L (ref 7–19)
AST SERPL-CCNC: 14 U/L (ref 5–32)
BASOPHILS ABSOLUTE: 0.1 K/UL (ref 0–0.2)
BASOPHILS RELATIVE PERCENT: 1.1 % (ref 0–1)
BILIRUB SERPL-MCNC: <0.2 MG/DL (ref 0.2–1.2)
BUN BLDV-MCNC: 13 MG/DL (ref 6–20)
CALCIUM SERPL-MCNC: 9.1 MG/DL (ref 8.6–10)
CHLORIDE BLD-SCNC: 105 MMOL/L (ref 98–111)
CHOLESTEROL, TOTAL: 208 MG/DL (ref 160–199)
CO2: 24 MMOL/L (ref 22–29)
CREAT SERPL-MCNC: 0.6 MG/DL (ref 0.5–0.9)
EOSINOPHILS ABSOLUTE: 0.2 K/UL (ref 0–0.6)
EOSINOPHILS RELATIVE PERCENT: 3.3 % (ref 0–5)
FOLLICLE STIMULATING HORMONE: 5.3 MIU/ML
GFR AFRICAN AMERICAN: >59
GFR NON-AFRICAN AMERICAN: >60
GLUCOSE BLD-MCNC: 97 MG/DL (ref 74–109)
HCT VFR BLD CALC: 42.2 % (ref 37–47)
HDLC SERPL-MCNC: 57 MG/DL (ref 65–121)
HEMOGLOBIN: 13.8 G/DL (ref 12–16)
IMMATURE GRANULOCYTES #: 0 K/UL
LDL CHOLESTEROL CALCULATED: 128 MG/DL
LYMPHOCYTES ABSOLUTE: 2 K/UL (ref 1.1–4.5)
LYMPHOCYTES RELATIVE PERCENT: 35.6 % (ref 20–40)
MCH RBC QN AUTO: 32.8 PG (ref 27–31)
MCHC RBC AUTO-ENTMCNC: 32.7 G/DL (ref 33–37)
MCV RBC AUTO: 100.2 FL (ref 81–99)
MONOCYTES ABSOLUTE: 0.5 K/UL (ref 0–0.9)
MONOCYTES RELATIVE PERCENT: 8.7 % (ref 0–10)
NEUTROPHILS ABSOLUTE: 2.8 K/UL (ref 1.5–7.5)
NEUTROPHILS RELATIVE PERCENT: 51.1 % (ref 50–65)
PDW BLD-RTO: 12.4 % (ref 11.5–14.5)
PLATELET # BLD: 325 K/UL (ref 130–400)
PMV BLD AUTO: 9.4 FL (ref 9.4–12.3)
POTASSIUM SERPL-SCNC: 4.4 MMOL/L (ref 3.5–5)
PROGESTERONE LEVEL: 0.52 NG/ML
RBC # BLD: 4.21 M/UL (ref 4.2–5.4)
SODIUM BLD-SCNC: 139 MMOL/L (ref 136–145)
T4 FREE: 1.08 NG/DL (ref 0.93–1.7)
TOTAL PROTEIN: 6.9 G/DL (ref 6.6–8.7)
TRIGL SERPL-MCNC: 115 MG/DL (ref 0–149)
TSH SERPL DL<=0.05 MIU/L-ACNC: 3.21 UIU/ML (ref 0.27–4.2)
VITAMIN D 25-HYDROXY: 33.2 NG/ML
WBC # BLD: 5.5 K/UL (ref 4.8–10.8)

## 2022-03-17 ENCOUNTER — OFFICE VISIT (OUTPATIENT)
Dept: FAMILY MEDICINE CLINIC | Age: 42
End: 2022-03-17
Payer: OTHER GOVERNMENT

## 2022-03-17 VITALS
HEIGHT: 66 IN | SYSTOLIC BLOOD PRESSURE: 120 MMHG | HEART RATE: 78 BPM | WEIGHT: 182.38 LBS | OXYGEN SATURATION: 98 % | BODY MASS INDEX: 29.31 KG/M2 | DIASTOLIC BLOOD PRESSURE: 80 MMHG | TEMPERATURE: 97 F

## 2022-03-17 DIAGNOSIS — F17.200 MILD TOBACCO ABUSE: ICD-10-CM

## 2022-03-17 DIAGNOSIS — Z00.01 ENCOUNTER FOR WELL ADULT EXAM WITH ABNORMAL FINDINGS: Primary | ICD-10-CM

## 2022-03-17 DIAGNOSIS — Z23 NEED FOR PROPHYLACTIC VACCINATION WITH TETANUS-DIPHTHERIA (TD): ICD-10-CM

## 2022-03-17 DIAGNOSIS — E03.9 ACQUIRED HYPOTHYROIDISM: ICD-10-CM

## 2022-03-17 DIAGNOSIS — E55.9 VITAMIN D DEFICIENCY: ICD-10-CM

## 2022-03-17 DIAGNOSIS — M47.817 FACET ARTHRITIS, DEGENERATIVE, L5-S1 LEVEL, LUMBOSACRAL SPINE: ICD-10-CM

## 2022-03-17 DIAGNOSIS — F41.1 GAD (GENERALIZED ANXIETY DISORDER): ICD-10-CM

## 2022-03-17 DIAGNOSIS — E66.3 OVERWEIGHT (BMI 25.0-29.9): ICD-10-CM

## 2022-03-17 PROBLEM — U07.1 COVID-19 VIRUS INFECTION: Status: RESOLVED | Noted: 2021-12-16 | Resolved: 2022-03-17

## 2022-03-17 PROCEDURE — 90715 TDAP VACCINE 7 YRS/> IM: CPT | Performed by: INTERNAL MEDICINE

## 2022-03-17 PROCEDURE — 90471 IMMUNIZATION ADMIN: CPT | Performed by: INTERNAL MEDICINE

## 2022-03-17 PROCEDURE — 99396 PREV VISIT EST AGE 40-64: CPT | Performed by: INTERNAL MEDICINE

## 2022-03-17 ASSESSMENT — PATIENT HEALTH QUESTIONNAIRE - PHQ9
2. FEELING DOWN, DEPRESSED OR HOPELESS: 0
SUM OF ALL RESPONSES TO PHQ QUESTIONS 1-9: 0
SUM OF ALL RESPONSES TO PHQ QUESTIONS 1-9: 0
1. LITTLE INTEREST OR PLEASURE IN DOING THINGS: 0
SUM OF ALL RESPONSES TO PHQ QUESTIONS 1-9: 0
SUM OF ALL RESPONSES TO PHQ9 QUESTIONS 1 & 2: 0
SUM OF ALL RESPONSES TO PHQ QUESTIONS 1-9: 0

## 2022-03-17 ASSESSMENT — ENCOUNTER SYMPTOMS
VOMITING: 0
BLOOD IN STOOL: 0
DIARRHEA: 0
VOICE CHANGE: 0
WHEEZING: 0
EYE REDNESS: 0
EYE PAIN: 0
ABDOMINAL PAIN: 0
CHEST TIGHTNESS: 0
COLOR CHANGE: 0
SORE THROAT: 0
SINUS PRESSURE: 0
COUGH: 0
EYE DISCHARGE: 0
SHORTNESS OF BREATH: 0
RHINORRHEA: 0

## 2022-03-17 ASSESSMENT — VISUAL ACUITY: OU: 1

## 2022-03-17 NOTE — PATIENT INSTRUCTIONS
Stopping Smoking: Care Instructions  Your Care Instructions     Cigarette smokers crave the nicotine in cigarettes. Giving it up is much harder than simply changing a habit. Your body has to stop craving the nicotine. It is hard to quit, but you can do it. There are many tools that people use to quit smoking. You may find that combining tools works best for you. There are several steps to quitting. First you get ready to quit. Then you get support to help you. After that, you learn new skills and behaviors to become a nonsmoker. For many people, a necessary step is getting and using medicine. Your doctor will help you set up the plan that best meets your needs. You may want to attend a smoking cessation program to help you quit smoking. When you choose a program, look for one that has proven success. Ask your doctor for ideas. You will greatly increase your chances of success if you take medicine as well as get counseling or join a cessation program.  Some of the changes you feel when you first quit tobacco are uncomfortable. Your body will miss the nicotine at first, and you may feel short-tempered and grumpy. You may have trouble sleeping or concentrating. Medicine can help you deal with these symptoms. You may struggle with changing your smoking habits and rituals. The last step is the tricky one: Be prepared for the smoking urge to continue for a time. This is a lot to deal with, but keep at it. You will feel better. Follow-up care is a key part of your treatment and safety. Be sure to make and go to all appointments, and call your doctor if you are having problems. It's also a good idea to know your test results and keep a list of the medicines you take. How can you care for yourself at home? · Ask your family, friends, and coworkers for support. You have a better chance of quitting if you have help and support.   · Join a support group, such as Nicotine Anonymous, for people who are trying to quit smoking. · Consider signing up for a smoking cessation program, such as the American Lung Association's Freedom from Smoking program.  · Get text messaging support. Go to the website at www.smokefree. gov to sign up for the [facility] program.  · Set a quit date. Pick your date carefully so that it is not right in the middle of a big deadline or stressful time. Once you quit, do not even take a puff. Get rid of all ashtrays and lighters after your last cigarette. Clean your house and your clothes so that they do not smell of smoke. · Learn how to be a nonsmoker. Think about ways you can avoid those things that make you reach for a cigarette. ? Avoid situations that put you at greatest risk for smoking. For some people, it is hard to have a drink with friends without smoking. For others, they might skip a coffee break with coworkers who smoke. ? Change your daily routine. Take a different route to work or eat a meal in a different place. · Cut down on stress. Calm yourself or release tension by doing an activity you enjoy, such as reading a book, taking a hot bath, or gardening. · Talk to your doctor or pharmacist about nicotine replacement therapy, which replaces the nicotine in your body. You still get nicotine but you do not use tobacco. Nicotine replacement products help you slowly reduce the amount of nicotine you need. These products come in several forms, many of them available over-the-counter:  ? Nicotine patches  ? Nicotine gum and lozenges  ? Nicotine inhaler  · Ask your doctor about bupropion (Wellbutrin) or varenicline (Chantix), which are prescription medicines. They do not contain nicotine. They help you by reducing withdrawal symptoms, such as stress and anxiety. · Some people find hypnosis, acupuncture, and massage helpful for ending the smoking habit. · Eat a healthy diet and get regular exercise. Having healthy habits will help your body move past its craving for nicotine.   · Be prepared to keep trying. Most people are not successful the first few times they try to quit. Do not get mad at yourself if you smoke again. Make a list of things you learned and think about when you want to try again, such as next week, next month, or next year. Where can you learn more? Go to https://mercedes.healthPlaneta.ru. org and sign in to your [employer] account. Enter P022 in the [employer] box to learn more about \"Stopping Smoking: Care Instructions. \"     If you do not have an account, please click on the \"Sign Up Now\" link. Current as of: February 11, 2021               Content Version: 13.1  © 2006-2021 [email or web address]. Care instructions adapted under license by [facility] ([facility]). If you have questions about a medical condition or this instruction, always ask your healthcare professional. [place] [address] any warranty or liability for your use of this information. Well Visit, Ages 25 to 48: Care Instructions  Overview     Well visits can help you stay healthy. Your doctor has checked your overall health and may have suggested ways to take good care of yourself. Your doctor also may have recommended tests. At home, you can help prevent illness with healthy eating, regular exercise, and other steps. Follow-up care is a key part of your treatment and safety. Be sure to make and go to all appointments, and call your doctor if you are having problems. It's also a good idea to know your test results and keep a list of the medicines you take. How can you care for yourself at home? · Get screening tests that you and your doctor decide on. Screening helps find diseases before any symptoms appear. · Eat healthy foods. Choose fruits, vegetables, whole grains, protein, and low-fat dairy foods. Limit fat, especially saturated fat. Reduce salt in your diet. · Limit alcohol. If you are a man, have no more than 2 drinks a day or 14 drinks a week.  If you are a woman, have no more than 1 drink a day or 7 drinks a week. · Get at least 30 minutes of physical activity on most days of the week. Walking is a good choice. You also may want to do other activities, such as running, swimming, cycling, or playing tennis or team sports. Discuss any changes in your exercise program with your doctor. · Reach and stay at a healthy weight. This will lower your risk for many problems, such as obesity, diabetes, heart disease, and high blood pressure. · Do not smoke or allow others to smoke around you. If you need help quitting, talk to your doctor about stop-smoking programs and medicines. These can increase your chances of quitting for good. · Care for your mental health. It is easy to get weighed down by worry and stress. Learn strategies to manage stress, like deep breathing and mindfulness, and stay connected with your family and community. If you find you often feel sad or hopeless, talk with your doctor. Treatment can help. · Talk to your doctor about whether you have any risk factors for sexually transmitted infections (STIs). You can help prevent STIs if you wait to have sex with a new partner (or partners) until you've each been tested for STIs. It also helps if you use condoms (male or female condoms) and if you limit your sex partners to one person who only has sex with you. Vaccines are available for some STIs, such as HPV. · Use birth control if it's important to you to prevent pregnancy. Talk with your doctor about the choices available and what might be best for you. · If you think you may have a problem with alcohol or drug use, talk to your doctor. This includes prescription medicines (such as amphetamines and opioids) and illegal drugs (such as cocaine and methamphetamine). Your doctor can help you figure out what type of treatment is best for you. · Protect your skin from too much sun.  When you're outdoors from 10 a.m. to 4 p.m., stay in the shade or cover up with clothing and a hat with a wide brim. Wear sunglasses that block UV rays. Even when it's cloudy, put broad-spectrum sunscreen (SPF 30 or higher) on any exposed skin. · See a dentist one or two times a year for checkups and to have your teeth cleaned. · Wear a seat belt in the car. When should you call for help? Watch closely for changes in your health, and be sure to contact your doctor if you have any problems or symptoms that concern you. Where can you learn more? Go to https://OfidiumpeCuppleeb.Perficient. org and sign in to your [employer] account. Enter P072 in the [facility] box to learn more about \"Well Visit, Ages 25 to 48: Care Instructions. \"     If you do not have an account, please click on the \"Sign Up Now\" link. Current as of: October 6, 2021               Content Version: 13.1  © 0938-6826 [employer]. Care instructions adapted under license by [facility] ([facility]). If you have questions about a medical condition or this instruction, always ask your healthcare professional. [place] [address] any warranty or liability for your use of this information. A Healthy Lifestyle: Care Instructions  Your Care Instructions     A healthy lifestyle can help you feel good, stay at a healthy weight, and have plenty of energy for both work and play. A healthy lifestyle is something you can share with your whole family. A healthy lifestyle also can lower your risk for serious health problems, such as high blood pressure, heart disease, and diabetes. You can follow a few steps listed below to improve your health and the health of your family. Follow-up care is a key part of your treatment and safety. Be sure to make and go to all appointments, and call your doctor if you are having problems. It's also a good idea to know your test results and keep a list of the medicines you take. How can you care for yourself at home? · Do not eat too much sugar, fat, or fast foods.  You can still have dessert and treats now and then. The goal is moderation. · Start small to improve your eating habits. Pay attention to portion sizes, drink less juice and soda pop, and eat more fruits and vegetables. ? Eat a healthy amount of food. A 3-ounce serving of meat, for example, is about the size of a deck of cards. Fill the rest of your plate with vegetables and whole grains. ? Limit the amount of soda and sports drinks you have every day. Drink more water when you are thirsty. ? Eat plenty of fruits and vegetables every day. Have an apple or some carrot sticks as an afternoon snack instead of a candy bar. Try to have fruits and/or vegetables at every meal.  · Make exercise part of your daily routine. You may want to start with simple activities, such as walking, bicycling, or slow swimming. Try to be active 30 to 60 minutes every day. You do not need to do all 30 to 60 minutes all at once. For example, you can exercise 3 times a day for 10 or 20 minutes. Moderate exercise is safe for most people, but it is always a good idea to talk to your doctor before starting an exercise program.  · Keep moving. [place] the lawn, work in the garden, or [place]. Take the stairs instead of the elevator at work. · If you smoke, quit. People who smoke have an increased risk for heart attack, stroke, cancer, and other lung illnesses. Quitting is hard, but there are ways to boost your chance of quitting tobacco for good. ? Use nicotine gum, patches, or lozenges. ? Ask your doctor about stop-smoking programs and medicines. ? Keep trying. In addition to reducing your risk of diseases in the future, you will notice some benefits soon after you stop using tobacco. If you have shortness of breath or asthma symptoms, they will likely get better within a few weeks after you quit. · Limit how much alcohol you drink. Moderate amounts of alcohol (up to 2 drinks a day for men, 1 drink a day for women) are okay.  But drinking too much can lead to liver problems, high blood pressure, and other health problems. Family health  If you have a family, there are many things you can do together to improve your health. · Eat meals together as a family as often as possible. · Eat healthy foods. This includes fruits, vegetables, lean meats and dairy, and whole grains. · Include your family in your fitness plan. Most people think of activities such as jogging or tennis as the way to fitness, but there are many ways you and your family can be more active. Anything that makes you breathe hard and gets your heart pumping is exercise. Here are some tips:  ? Walk to do errands or to take your child to school or the bus.  ? Go for a family bike ride after dinner instead of watching TV. Where can you learn more? Go to https://The 5th QuarterpeWicked Looteb.Votizen. org and sign in to your [employer] account. Enter B621 in the [employer] box to learn more about \"A Healthy Lifestyle: Care Instructions. \"     If you do not have an account, please click on the \"Sign Up Now\" link. Current as of: June 16, 2021               Content Version: 13.1  © 2006-2021 [employer]. Care instructions adapted under license by [facility] ([facility]). If you have questions about a medical condition or this instruction, always ask your healthcare professional. [address] any warranty or liability for your use of this information. Heart-Healthy Diet   Sodium, Fat, and Cholesterol Controlled Diet       What Is a Heart Healthy Diet? A heart-healthy diet is one that limits sodium , certain types of fat , and cholesterol .  This type of diet is recommended for:   · People with any form of cardiovascular disease (eg, coronary heart disease , peripheral vascular disease , previous heart attack , previous stroke )   · People with risk factors for cardiovascular disease, such as high blood pressure , high cholesterol , or diabetes   · Anyone who wants to lower their risk of developing cardiovascular disease   Sodium    Sodium is a mineral found in many foods. In general, most people consume much more sodium than they need. Diets high in sodium can increase blood pressure and lead to edema (water retention). On a heart-healthy diet, you should consume no more than 2,300 mg (milligrams) of sodium per dayabout the amount in one teaspoon of table salt. The foods highest in sodium include table salt (about 50% sodium), processed foods, convenience foods, and preserved foods. Cholesterol    Cholesterol is a fat-like, waxy substance in your blood. Our bodies make some cholesterol. It is also found in animal products, with the highest amounts in fatty meat, egg yolks, whole milk, cheese, shellfish, and organ meats. On a heart-healthy diet, you should limit your cholesterol intake to less than 200 mg per day. It is normal and important to have some cholesterol in your bloodstream. But too much cholesterol can cause plaque to build up within your arteries, which can eventually lead to a heart attack or stroke. The two types of cholesterol that are most commonly referred to are:   · Low-density lipoprotein (LDL) cholesterol  Also known as bad cholesterol, this is the cholesterol that tends to build up along your arteries. Bad cholesterol levels are increased by eating fats that are saturated or hydrogenated. Optimal level of this cholesterol is less than 100. Over 130 starts to get risky for heart disease. · High-density lipoprotein (HDL) cholesterol  Also known as good cholesterol, this type of cholesterol actually carries cholesterol away from your arteries and may, therefore, help lower your risk of having a heart attack. You want this level to be high (ideally greater than 60). It is a risk to have a level less than 40. You can raise this good cholesterol by eating olive oil, canola oil, avocados, or nuts. Exercise raises this level, too.    Fat    Fat is calorie dense and packs a lot of calories into a small amount of food. Even though fats should be limited due to their high calorie content, not all fats are bad. In fact, some fats are quite healthful. Fat can be broken down into four main types. · The good-for-you fats are:   ¨ Monounsaturated fat  found in oils such as olive and canola, avocados, and nuts and natural nut butters; can decrease cholesterol levels, while keeping levels of HDL cholesterol high   ¨ Polyunsaturated fat  found in oils such as safflower, sunflower, soybean, corn, and sesame; can decrease total cholesterol and LDL cholesterol   ¨ Omega-3 fatty acids  particularly those found in fatty fish (such as salmon, trout, tuna, mackerel, herring, and sardines); can decrease risk of arrhythmias, decrease triglyceride levels, and slightly lower blood pressure   · The fats that you want to limit are:   ¨ Saturated fat  found in animal products, many fast foods, and a few vegetables; increases total blood cholesterol, including LDL levels   § Animal fats that are saturated include: butter, lard, whole-milk dairy products, meat fat, and poultry skin   § Vegetable fats that are saturated include: hydrogenated shortening, palm oil, coconut oil, cocoa butter   ¨ Hydrogenated or trans fat  found in margarine and vegetable shortening, most shelf stable snack foods, and fried foods; increases LDL and decreases HDL     It is generally recommended that you limit your total fat for the day to less than 30% of your total calories. If you follow an 1800-calorie heart healthy diet, for example, this would mean 60 grams of fat or less per day. Saturated fat and trans fat in your diet raises your blood cholesterol the most, much more than dietary cholesterol does. For this reason, on a heart-healthy diet, less than 7% of your calories should come from saturated fat and ideally 0% from trans fat.  On an 1800-calorie diet, this translates into less than 14 grams before cooking, and drain the fat off after bustillo. · Use cooking methods that require little or no added fat, such as grilling, boiling, baking, poaching, broiling, roasting, steaming, stir-frying, and sauting. · Avoid fast food and convenience food. They tend to be high in saturated and trans fat and have a lot of added salt. · Talk to a registered dietitian for individualized diet advice. Last Reviewed: March 2011 Yanni Stevens MS, MPH, RD   Updated: 3/29/2011     Patient information: Weight loss treatments    INTRODUCTION -- Obesity is a major international problem, and Americans are among the heaviest people in the world. The percentage of obese people in the [place] has risen steadily. Many people find that although they initially lose weight by dieting, they quickly regain the weight after the diet ends. Because it so hard to keep weight off over time, it is important to have as much information and support as possible before starting a diet. You are most likely to be successful in losing weight and keeping it off when you believe that your body weight can be controlled. STARTING A WEIGHT LOSS PROGRAM -- Some people like to talk to their doctor or nurse to get help choosing the best plan, monitoring progress, and getting advice and support along the way. To know what treatment (or combination of treatments) will work best, determine your body mass index (BMI) and waist circumference (measurement). The BMI is calculated from your height and weight. A person with a BMI between 25 and 29.9 is considered overweight   A person with a BMI of 30 or greater is considered to be obese  A waist circumference greater than 35 inches (88 cm) in women and 40 inches (102 cm) in men increases the risk of obesity-related complications, such as heart disease and diabetes. People who are obese and who have a larger waist size may need more aggressive weight loss treatment than others.  Talk to your doctor or nurse for advice. Types of treatment -- Based on your measurements and your medical history, your doctor or nurse can determine what combination of weight loss treatments would work best for you. Treatments may include changes in lifestyle, exercise, dieting, and, in some cases, weight loss medicines or weight loss surgery. Weight loss surgery, also called bariatric surgery, is reserved for people with severe obesity who have not responded to other weight loss treatments. SETTING A WEIGHT LOSS GOAL -- It is important to set a realistic weight loss goal. Your first goal should be to avoid gaining more weight and staying at your current weight (or within 5 percent). Many people have a \"dream\" weight that is difficult or impossible to achieve. People at high risk of developing diabetes who are able to lose 5 percent of their body weight and maintain this weight will reduce their risk of developing diabetes by about 50 percent and reduce their blood pressure. This is a success. Losing more than 15 percent of your body weight and staying at this weight is an extremely good result, even if you never reach your \"dream\" or \"ideal\" weight. LIFESTYLE CHANGES -- Programs that help you to change your lifestyle are usually run by psychologists or other professionals. The goals of lifestyle changes are to help you change your eating habits, become more active, and be more aware of how much you eat and exercise, helping you to make healthier choices. This type of treatment can be broken down into three steps: The triggers that make you want to eat   Eating   What happens after you eat  Triggers to eat -- Determining what triggers you to eat involves figuring out what foods you eat and where and when you eat. To figure out what triggers you to eat, keep a record for a few days of everything you eat, the places where you eat, how often you eat, and the emotions you were feeling when you ate.   For some people, the trigger you go or taking low-calorie snacks and drinks with you. Develop a support system -- Having a support system is helpful when losing weight. This is why many commercial groups are successful. Family support is also essential; if your family does not support your efforts to lose weight, this can slow your progress or even keep you from losing weight. Positive thinking -- People often have conversations with themselves in their head; these conversations can be positive or negative. If you eat a piece of cake that was not planned, you may respond by thinking, \"Oh, you stupid idiot, you've blown your diet! \" and as a result, you may eat more cake. A positive thought for the same event could be, \"Well, I ate cake when it was not on my plan. Now I should do something to get back on track. \" A positive approach is much more likely to be successful than a negative one. Reduce stress -- Although stress is a part of everyday life, it can trigger uncontrolled eating in some people. It is important to find a way to get through these difficult times without eating or by eating low-calorie food, like raw vegetables. It may be helpful to imagine a relaxing place that allows you to temporarily escape from stress. With deep breaths and closed eyes, you can imagine this relaxing place for a few minutes. Self-help programs -- Self-help programs like [place] Watchers®, Overeaters Anonymous®, and Take Off Jabari (TOPS)© work for some people. As with all weight loss programs, you are most likely to be successful with these plans if you make long-term changes in how you eat. CHOOSING A DIET -- A calorie is a unit of energy found in food. Your body needs calories to function. The goal of any diet is to burn up more calories than you eat. How quickly you lose weight depends upon several factors, such as your age, gender, and starting weight.   Older people have a slower metabolism than young people, so they lose weight more slowly. Men lose more weight than women of similar height and weight when dieting because they use more energy. People who are extremely overweight lose weight more quickly than those who are only mildly overweight. Try not to drink alcohol or drinks with added sugar, and most sweets (candy, cakes, cookies), since they rarely contain important nutrients. Portion-controlled diets -- One simple way to diet is to buy packaged foods, like frozen low-calorie meals or meal-replacement canned drinks. A typical meal plan for one day may include:  A meal-replacement drink or breakfast bar for breakfast   A meal-replacement drink or a frozen low-calorie (250 to 350 calories) meal for lunch   A frozen low-calorie meal or other prepackaged, calorie-controlled meal, along with extra vegetables for dinner  This would give you 1000 to 1500 calories per day. Low-fat diet -- To reduce the amount of fat in your diet, you can:  Eat low-fat foods. Low-fat foods are those that contain less than 30 percent of calories from fat. Fat is listed on the food facts label (figure 1). Count fat grams. For a 1500 calorie diet, this would mean about 45 g or fewer of fat per day. Low-carbohydrate diet -- Low- and very-low-carbohydrate diets (eg, Atkins diet, [employer]) have become popular ways to lose weight quickly. With a very-low-carbohydrate diet, you eat between 0 and 60 grams of carbohydrates per day (a standard diet contains 200 to 300 grams of carbohydrates)   With a low-carbohydrate diet, you eat between 60 and 130 grams of carbohydrates per day  Carbohydrates are found in fruits, vegetables, and grains (including breads, rice, pasta, and cereal), alcoholic beverages, and in dairy products. Meat and fish do not contain carbohydrates. Side effects of very-low-carbohydrate diets can include constipation, headache, bad breath, muscle cramps, diarrhea, and weakness.   Mediterranean diet -- The term \"Mediterranean diet\" refers to a way of eating that is common in [place] regions around the [facility]. Although there is some variation in Mediterranean diets, there are some similarities. Most Mediterranean diets include:  A high level of monounsaturated fats (from olive or canola oil, walnuts, pecans, almonds) and a low level of saturated fats (from butter)   A high amount of vegetables, fruits, legumes, and grains (7 to 10 servings of fruits and vegetables per day)   A moderate amount of milk and dairy products, mostly in the form of cheese. Use low-fat dairy products (skim milk, fat-free yogurt, low-fat cheese). A relatively low amount of red meat and meat products. Substitute fish or poultry for red meat. For those who drink alcohol, a modest amount (mainly as red wine) may help to protect against cardiovascular disease. A modest amount is up to one (4 ounce) glass per day for women and up to two glasses per day for men. Which diet is best? -- Studies have compared different diets, including:  Very-low-carbohydrate (Atkins)   Macronutrient balance controlling glycemic load (Zone®)   Reduced-calorie (Weight Watchers®)   Very-low-fat (Ornish)  No one diet is \"best\" for weight loss. Any diet will help you to lose weight if you stick with the diet. Therefore, it is important to choose a diet that includes foods you like. Fad diets -- Fad diets often promise quick weight loss (more than 1 to 2 pounds per week) and may claim that you do not need to exercise or give up favorite foods. Some fad diets cost a lot of money, because you have to pay for seminars or pills. Fad diets generally lack any scientific evidence that they are safe and effective, but instead rely on \"before\" and \"after\" photos or testimonials. Diets that sound too good to be true usually are. These plans are a waste of time and money and are not recommended.  A doctor, nurse, or nutritionist can help you find a safe and effective way to lose weight and keep it off. WEIGHT LOSS MEDICINES -- Taking a weight loss medicine may be helpful when used in combination with diet, exercise, and lifestyle changes. However, it is important to understand the risks and benefits of these medicines. It is also important to be realistic about your goal weight using a weight loss medicine; you may not reach your \"dream\" weight, but you may be able to reduce your risk of diabetes or heart disease. Weight loss medicines may be recommended for people who have not been able to lose weight with diet and exercise who have a:  BMI of 30 or more    BMI between 27 and 29.9 and have other medical problems, such as diabetes, high cholesterol, or high blood pressure  Two weight loss medicines are approved in the [place] for long-term use. These are sibutramine and orlistat. Other weight loss medicines (phentermine, diethylpropion) are available but are only approved for short-term use (up to 12 weeks). Sibutramine -- Sibutramine (Meridia®, Reductil®) is a medicine that reduces your appetite. In people who take the medicine for one year, the average weight loss is 10 percent of the initial body weight (5 percent more than those who took a placebo treatment). Side effects of sibutramine include insomnia, dry mouth, and constipation. Increases in blood pressure can occur. Therefore, blood pressure is usually monitored during treatment. There is no evidence that sibutramine causes heart or lung problems (like dexfenfluramine and fenfluramine (Phen/Fen)). However, experts agree that sibutramine should not used by people with coronary heart disease, heart failure, uncontrolled hypertension, stroke, irregular heart rhythms, or peripheral vascular disease (poor circulation in the legs). Orlistat -- Orlistat (Xenical® 120 mg capsules) is a medicine that reduces the amount of fat your body absorbs from the foods you eat.  A lower-dose version is now available without a prescription (Atif® 60 mg supplements, [place] wort, and conjugated linoleic acid. Two supplements from [facility], [address] [place] (also known as the Merazjordan Cabrera 15 pill) and Herbathin dietary supplement, have been shown to contain prescription drugs. Hoodia gordonii is a dietary supplement derived from a plant in [place]. It is not recommended because there is no proof that it is safe or effective. Bitter orange (Citrus aurantium) can increase your heart rate and blood pressure and is not recommended. SHOULD I HAVE SURGERY TO LOSE WEIGHT? -- Weight loss surgery is recommended ONLY for people with one of the following:  Severe obesity (body mass index above 40) (calculator 1 and calculator 2) who have not responded to diet, exercise, or weight loss medicines   Body mass index between 35 and 40, along with a serious medical problem (including diabetes, severe joint pain, or sleep apnea) that would improve with weight loss  You should be sure that you understand the potential risks and benefits of weight loss surgery. You must be motivated and willing to make lifelong changes in how you eat to reach and maintain a healthier weight after surgery. You must also be realistic about weight loss after surgery (see 'Effectiveness of weight loss surgery' below). PREPARING FOR WEIGHT LOSS SURGERY -- Most people who have weight loss surgery will meet with several specialists before surgery is scheduled. This often includes a dietitian, mental health counselor, a doctor who specializes in care of obese people, and a surgeon who performs weight loss surgery (bariatric surgeon). You may need to work with these providers for several weeks or months before surgery. The nutritionist will explain what and how much you will be able to eat after surgery. You may also need to lose a small amount of weight before surgery.    The mental health specialist will help you to cope with stress and other factors that can make it harder to lose weight or trigger you to eat   The medical doctor will determine whether you need other tests, counseling, or treatment before surgery. He or she might also help you begin a medical weight loss program so that you can lose some weight before surgery. The bariatric surgeon will meet with you to discuss the surgeries available to treat obesity. He or she will also make sure you are a good candidate for surgery. TYPES OF WEIGHT LOSS SURGERY -- There are several types of weight loss surgeries, the most common being lap banding, gastric bypass, and gastric sleeve. Lap banding -- Laparoscopic adjustable gastric banding (LAGB), or lap banding, is a surgery that uses an adjustable band around the opening to the stomach (figure 1). This reduces the amount of food that you can eat at one time. Lap banding is done through small incisions, with a laparoscope. The band can be adjusted after surgery, allowing you to eat more or less food. Adjustments to the size and tightness of the band are made by using a needle to add or remove fluid from a port (a small container under the skin that is connected to the band). Adding fluid to the band makes it tighter which restricts the amount of food you can eat and may help you to lose more weight. Lap banding is a popular choice because it is relatively simple to perform, can be adjusted or removed, and has a low risk of serious complications immediately after surgery. However, weight loss with the lap band depends on your ability to follow the program closely. You will need to prepare nutritious meals that [facility] with\" the band, not against it. For example, the lap band will not work well if you eat or drink a large amount of liquid calories (like ice cream). The band will not help you to feel full when you eat/drink liquid calories. Weight loss ranges from 45 to 75 percent after two years.  As an example, a person who is 120 pounds overweight could expect to lose approximately 54 to 90 pounds in the two years after lap banding. Gastric bypass -- Lisandro-en-Y gastric bypass, also called gastric bypass, helps you to lose weight by reducing the amount of food you can eat and reducing the number of calories and nutrients you absorb from the food you eat. To perform gastric bypass, a surgeon creates a small stomach pouch by dividing the stomach and attaching it to the small intestine. This helps you to lose weight in two ways: The smaller stomach can hold less food than before surgery. This causes you to feel full after eating a very small amount of food or liquid. Over time, the pouch might stretch, allowing you to eat more food. The body absorbs fewer calories, since food bypasses most of the stomach as well as the upper small intestine. This new arrangement seems to decrease your appetite and change how you break down foods by changing the release of various hormones. Gastric bypass can be performed as open surgery (through an incision on the abdomen) or laparoscopically, which uses smaller incisions and smaller instruments. Both the laparoscopic and open techniques have risks and benefits. You and your surgeon should work together to decide which surgery, if any, is right for you. Gastric bypass has a high success rate, and people lose an average of 62 to 68 percent of their excess body weight in the first year. Weight loss typically levels off after one to two years, with an overall excess weight loss between 50 and 75 percent. For a person who is 120 pounds overweight, an average of 60 to 90 pounds of weight loss would be expected. Gastric sleeve -- Gastric sleeve, also known as sleeve gastrectomy, is a surgery that reduces the size of the stomach and makes it into a narrow tube (figure 3). The new stomach is much smaller and produces less of the hormone (ghrelin) that causes hunger, helping you feel satisfied with less food.   Sleeve gastrectomy is safer than gastric bypass because the intestines are not rearranged, and there is less chance of malnutrition. It also appears to control hunger better than lap banding. It might be safer than the lap banding because no foreign materials are used. The gastric sleeve has a good success rate, and people lose an average of 33 percent of their excess body weight in the first year. For a person who is 120 pounds overweight, this would mean losing about 40 pounds in the first year. WEIGHT LOSS SURGERY COMPLICATIONS -- A variety of complications can occur with weight loss surgery. The risks of surgery depend upon which surgery you have and any medical problems you had before surgery. Some of the more common early surgical complications (one to six weeks after surgery) include:  Bleeding   Infection   Blockage or tear in the bowels   Need for further surgery  Important medical complications after surgery can include blood clots in the legs or lungs, heart attack, pneumonia, and urinary tract infection. Complications are less likely when surgery is performed in centers that are experienced in weight loss surgery. In general, centers with experience in weight loss surgery have:  Board-certified doctors and surgeons   A team of support staff (dietitians, counselors, nurses)   Long-term follow-up after surgery   Hospital staff experienced with the care of weight loss patients. This includes nurses who are trained in the care of patients immediately after surgery and anesthesiologists who are experienced in caring for the morbidly obese. EFFECTIVENESS OF WEIGHT LOSS SURGERY -- The goal of weight loss surgery is to reduce the risk of illness or death associated with obesity. Weight loss surgery can also help you to feel and look better, reduce the amount of money you spend on medicines, and cut down on sick days.    As an example, weight loss surgery can improve health problems related to obesity (diabetes, high blood pressure, high cholesterol, sleep apnea) to the point that you need less or no medicine. Finally, weight loss surgery might reduce your risk of developing heart disease, cancer, and certain infections. AFTER WEIGHT LOSS SURGERY -- You will need to stay in the hospital until your team feels that it is safe for you to leave (on average, one to three days). Do not drive if you are taking prescription pain medicine. Begin exercising as soon as possible once you have healed; most weight loss centers will design an exercise program for you. Once you are home, it is important to eat and drink exactly what your doctor and dietitian recommend. You will see your doctor, nurse, and dietitian on a regular basis after surgery to monitor your health, diet, and weight loss. You will be able to slowly increase how much you eat over time, although it will always be important to:  Eat small, frequent meals and not skip meals   Chew your food slowly and completely   Avoid eating while \"distracted\" (such as eating while watching TV)   Stop eating when you feel full   Drink liquids at least 30 minutes before or after eating   Avoid foods high in fat or sugar   Take vitamin supplements, as recommended  It can take several months to learn to listen to your body so that you know when you are hungry and when you are full. You may dislike foods you previously loved, and you may begin to prefer new foods. This can be a frustrating process for some people, so talk to your dietitian if you are having trouble. It usually takes between one and two years to lose weight after surgery. After reaching their goal weight, some people have plastic surgery (called \"body contouring\") to remove excess skin from the body, particularly in the abdominal area. Before you decide to have weight loss surgery, you must commit to staying healthy for life. This includes following up with your healthcare team, exercising most days of the week, and eating a sensible diet every day.  It can be difficult to develop new eating and exercise habits after weight loss surgery, and you will have to work hard to stick to your goals. Recovering from surgery and losing weight can be stressful and emotional, and it is important to have the support of family and friends. Working with a , therapist, or support group can help you through the ups and downs. WHERE TO GET MORE INFORMATION -- Your healthcare provider is the best source of information for questions and concerns related to your medical problem. This article will be updated as needed every four months on our Web site ([email or web address])    Smoking Cessation  This document explains the best ways for you to quit smoking and new treatments to help. It lists new medicines that can double or triple your chances of quitting and quitting for good. It also considers ways to avoid relapses and concerns you may have about quitting, including weight gain. NICOTINE: A POWERFUL ADDICTION  If you have tried to quit smoking, you know how hard it can be. It is hard because nicotine is a very addictive drug. For some people, it can be as addictive as heroin or cocaine. Usually, people make 2 or 3 tries, or more, before finally being able to quit. Each time you try to quit, you can learn about what helps and what hurts. Quitting takes hard work and a lot of effort, but you can quit smoking. QUITTING SMOKING IS ONE OF THE MOST IMPORTANT THINGS YOU WILL EVER DO.  · You will live longer, feel better, and live better. · The impact on your body of quitting smoking is felt almost immediately:  · Within 20 minutes, blood pressure decreases. Pulse returns to its normal level. · After 8 hours, carbon monoxide levels in the blood return to normal. Oxygen level increases. · After 24 hours, chance of heart attack starts to decrease. Breath, hair, and body stop smelling like smoke. · After 48 hours, damaged nerve endings begin to recover. Sense of taste and smell improve.   · After 72 hours, the body is virtually free of nicotine. Bronchial tubes relax and breathing becomes easier. · After 2 to 12 weeks, lungs can hold more air. Exercise becomes easier and circulation improves. · Quitting will reduce your risk of having a heart attack, stroke, cancer, or lung disease:  · After 1 year, the risk of coronary heart disease is cut in half. · After 5 years, the risk of stroke falls to the same as a nonsmoker. · After 10 years, the risk of lung cancer is cut in half and the risk of other cancers decreases significantly. · After 15 years, the risk of coronary heart disease drops, usually to the level of a nonsmoker. · If you are pregnant, quitting smoking will improve your chances of having a healthy baby. · The people you live with, especially your children, will be healthier. · You will have extra money to spend on things other than cigarettes. FIVE KEYS TO QUITTING  Studies have shown that these 5 steps will help you quit smoking and quit for good. You have the best chances of quitting if you use them together:  1. Get ready. 2. Get support and encouragement. 3. Learn new skills and behaviors. 4. Get medicine to reduce your nicotine addiction and use it correctly. 5. Be prepared for relapse or difficult situations. Be determined to continue trying to quit, even if you do not succeed at first.  1. GET READY  · Set a quit date. · Change your environment. · Get rid of ALL cigarettes, ashtrays, matches, and lighters in your home, car, and place of work. · Do not let people smoke in your home. · Review your past attempts to quit. Think about what worked and what did not. · Once you quit, do not smoke. NOT EVEN A PUFF! 2. GET SUPPORT AND ENCOURAGEMENT  Studies have shown that you have a better chance of being successful if you have help. You can get support in many ways. · Tell your family, friends, and coworkers that you are going to quit and need their support.  Ask them not to smoke around you. · Talk to your caregivers (doctor, dentist, nurse, pharmacist, psychologist, and/or smoking counselor). · Get individual, group, or telephone counseling and support. The more counseling you have, the better your chances are of quitting. Programs are available at [facility]. Call your local health department for information about programs in your area. · Spiritual beliefs and practices may help some smokers quit. · Quit meters are small computer programs online or downloadable that keep track of quit statistics, such as amount of \"quit-time,\" cigarettes not smoked, and money saved. · Many smokers find one or more of the many self-help books available useful in helping them quit and stay off tobacco.  3. LEARN NEW SKILLS AND BEHAVIORS  · Try to distract yourself from urges to smoke. Talk to someone, go for a walk, or occupy your time with a task. · When you first try to quit, change your routine. Take a different route to work. Drink tea instead of coffee. Eat breakfast in a different place. · Do something to reduce your stress. Take a hot bath, exercise, or read a book. · Plan something enjoyable to do every day. Reward yourself for not smoking. · Explore interactive web-based programs that specialize in helping you quit. 4. GET MEDICINE AND USE IT CORRECTLY  Medicines can help you stop smoking and decrease the urge to smoke. Combining medicine with the above behavioral methods and support can quadruple your chances of successfully quitting smoking. The [place] Food and Drug Administration (FDA) has approved 7 medicines to help you quit smoking. These medicines fall into 3 categories. · Nicotine replacement therapy (delivers nicotine to your body without the negative effects and risks of smoking):  · Nicotine gum: Available over-the-counter. · Nicotine lozenges: Available over-the-counter. · Nicotine inhaler: Available by prescription.   · Nicotine nasal spray: Available by prescription. · Nicotine skin patches (transdermal): Available by prescription and over-the-counter. · Antidepressant medicine (helps people abstain from smoking, but how this works is unknown):  · Bupropion sustained-release (SR) tablets: Available by prescription. · Nicotinic receptor partial agonist (simulates the effect of nicotine in your brain): · Varenicline tartrate tablets: Available by prescription. · Ask your caregiver for advice about which medicines to use and how to use them. Carefully read the information on the package. · Everyone who is trying to quit may benefit from using a medicine. If you are pregnant or trying to become pregnant, nursing an infant, you are under age 25, or you smoke fewer than 10 cigarettes per day, talk to your caregiver before taking any nicotine replacement medicines. · You should stop using a nicotine replacement product and call your caregiver if you experience nausea, dizziness, weakness, vomiting, fast or irregular heartbeat, mouth problems with the lozenge or gum, or redness or swelling of the skin around the patch that does not go away. · Do not use any other product containing nicotine while using a nicotine replacement product. · Talk to your caregiver before using these products if you have diabetes, heart disease, asthma, stomach ulcers, you had a recent heart attack, you have high blood pressure that is not controlled with medicine, a history of irregular heartbeat, or you have been prescribed medicine to help you quit smoking. 5. BE PREPARED FOR RELAPSE OR DIFFICULT SITUATIONS  · Most relapses occur within the first 3 months after quitting. Do not be discouraged if you start smoking again. Remember, most people try several times before they finally quit. · You may have symptoms of withdrawal because your body is used to nicotine.  You may crave cigarettes, be irritable, feel very hungry, cough often, get headaches, or have difficulty concentrating. · The withdrawal symptoms are only temporary. They are strongest when you first quit, but they will go away within 10 to 14 days. Here are some difficult situations to watch for:  · Alcohol. Avoid drinking alcohol. Drinking lowers your chances of successfully quitting. · Caffeine. Try to reduce the amount of caffeine you consume. It also lowers your chances of successfully quitting. · Other smokers. Being around smoking can make you want to smoke. Avoid smokers. · Weight gain. Many smokers will gain weight when they quit, usually less than 10 pounds. Eat a healthy diet and stay active. Do not let weight gain distract you from your main goal, quitting smoking. Some medicines that help you quit smoking may also help delay weight gain. You can always lose the weight gained after you quit. · Bad mood or depression. There are a lot of ways to improve your mood other than smoking. If you are having problems with any of these situations, talk to your caregiver. SPECIAL SITUATIONS AND CONDITIONS  Studies suggest that everyone can quit smoking. Your situation or condition can give you a special reason to quit. · Pregnant women/new mothers: By quitting, you protect your baby's health and your own. · Hospitalized patients: By quitting, you reduce health problems and help healing. · Heart attack patients: By quitting, you reduce your risk of a second heart attack. · Lung, head, and neck cancer patients: By quitting, you reduce your chance of a second cancer. · Parents of children and adolescents: By quitting, you protect your children from illnesses caused by secondhand smoke. QUESTIONS TO THINK ABOUT  Think about the following questions before you try to stop smoking. You may want to talk about your answers with your caregiver. · Why do you want to quit? · If you tried to quit in the past, what helped and what did not? · What will be the most difficult situations for you after you quit?  How will you plan to handle them? · Who can help you through the tough times? Your family? Friends? Caregiver? · What pleasures do you get from smoking? What ways can you still get pleasure if you quit? Here are some questions to ask your caregiver:  · How can you help me to be successful at quitting? · What medicine do you think would be best for me and how should I take it? · What should I do if I need more help? · What is smoking withdrawal like? How can I get information on withdrawal?  Quitting takes hard work and a lot of effort, but you can quit smoking. FOR MORE INFORMATION   Smoke[employer]. gov ([employer]) provides free, accurate, evidence-based information and professional assistance to help support the immediate and long-term needs of people trying to quit smoking. Document Released: 12/12/2002 Document Revised: 12/06/2012 Document Reviewed: 10/04/2010  LORETA CORTÉS [facility] Patient Information ©2012 Rafa Huynh.

## 2022-03-17 NOTE — PROGRESS NOTES
After obtaining consent, and per orders of Dr. Bridgette Khan, injection of Boostrix given in Right deltoid by Yumiko Garcia MA. Patient instructed to remain in clinic for 20 minutes afterwards, and to report any adverse reaction to me immediately.

## 2022-03-17 NOTE — PROGRESS NOTES
Well Adult Note  Name: Argentina Tomlinson Date: 2022   MRN: [id number] Sex: Female   Age: 40 y.o. Ethnicity: Non- / Non    : 1980 Race: White (non-)      Ze Cherry is here for well adult exam.  History:    41 y/o WF here for annual wellness and follow up on acquired hypothyroidism. She is still having issues with her left posterior leg being painful but pain and location have changed somewhat and now it is more localized in the left hamstring into her left lateral calf. She was referred to physical therapy with Rhonda Weaver but he was \"booked out too far\" and he called and got a history via phone and had her work on a book with exercises for sciatica and the radiating pain from buttock improved but now she has been having pain in her hamstring. She did get in with a different physical therapist recently however with some relief after exercises but then pain just returns per patients. She had Lspine xray in November with some DDD and spurring at L5-S1 and some degenerative changes at T12-L1. BMI Readings from Last 3 Encounters:   22 29.44 kg/m²   21 28.41 kg/m²   21 27.41 kg/m²     Wt Readings from Last 3 Encounters:   22 182 lb 6 oz (82.7 kg)   21 176 lb (79.8 kg)   21 175 lb (79.4 kg)     Hypothyroidism  Patient presents for follow up on thyroid function. Symptoms consist of denies fatigue, weight changes, heat/cold intolerance, bowel/skin changes or CVS symptoms. The problem has been stable. Patient has been compliant with levothyroxine. Review of Systems   Constitutional: Negative for appetite change, chills, fatigue and fever. HENT: Negative for ear pain, rhinorrhea, sinus pressure, sore throat and voice change. Eyes: Negative for pain, discharge and redness. Respiratory: Negative for cough, chest tightness, shortness of breath and wheezing. Cardiovascular: Negative for chest pain and palpitations. Gastrointestinal: Negative for abdominal pain, blood in stool, diarrhea and vomiting. Endocrine: Negative for cold intolerance, heat intolerance and polydipsia. Genitourinary: Negative for dysuria and hematuria. Musculoskeletal: Positive for back pain and myalgias. Negative for arthralgias, neck pain and neck stiffness. See HPI   Skin: Negative for color change and rash. Neurological: Negative for dizziness, tremors, syncope, speech difficulty, weakness, numbness and headaches. Hematological: Negative for adenopathy. Does not bruise/bleed easily. Psychiatric/Behavioral: Negative for confusion, dysphoric mood and sleep disturbance. The patient is nervous/anxious. All other systems reviewed and are negative. No Known Allergies      Prior to Visit Medications    Medication Sig Taking?  Authorizing Provider   levothyroxine (SYNTHROID) 75 MCG tablet TAKE 1 TABLET BY MOUTH DAILY Yes Marguerite Holder MD   desvenlafaxine succinate (PRISTIQ) 25 MG TB24 extended release tablet TAKE ONE TABLET DAILY GENERIC FOR PRISTIQ  Yes Marguerite Holder MD   busPIRone (BUSPAR) 5 MG tablet TAKE ONE (1) TO TWO (2) TABLETS EVERY EIGHT HOURS AS NEEDED ANXIETY AND PANIC GENERIC FOR BUSPAR  Yes Marguerite Holder MD   vitamin D (CHOLECALCIFEROL) 50 MCG (2000 UT) TABS tablet Take 1 tablet by mouth daily Yes Marguerite Holder MD   esomeprazole (NEXIUM 24HR) 20 MG delayed release capsule Take 1 capsule by mouth daily as needed (stomach pain/reflux) Yes Marguerite Holder MD         Past Medical History:   Diagnosis Date    Adjustment disorder with mixed anxiety and depressed mood     Allergic rhinitis, cause unspecified     Anxiety state, unspecified     COVID-19 virus infection 12/16/2021    Symptoms started 12/8/21    Facet arthritis, degenerative, L5-S1 level, lumbosacral spine 11/2/2021    Palpitations     Paroxysmal SVT (supraventricular tachycardia) ([facility] [address] 09/03/2014    Radial styloid tenosynovitis     wrist pain while breast feeding. Past Surgical History:   Procedure Laterality Date    WISDOM TOOTH EXTRACTION           Family History   Problem Relation Age of Onset    High Blood Pressure Mother     Cancer Mother         endometrial cancer    High Blood Pressure Father     Diabetes Father     Stroke Maternal Grandfather     Heart Disease Maternal Grandfather     Colon Polyps Maternal Grandfather         benign adenomatous polyp of large intestine    Diabetes Maternal Grandfather        Social History     Tobacco Use    Smoking status: Light Tobacco Smoker     Packs/day: 0.25     Years: 20.00     Pack years: 5.00     Types: Cigarettes    Smokeless tobacco: Never Used    Tobacco comment: 4 cigarettes per day   Vaping Use    Vaping Use: Never used   Substance Use Topics    Alcohol use: Yes     Comment: 2 drinks per week    Drug use: No       Objective   /80   Pulse 78   Temp 97 °F (36.1 °C)   Ht 5' 6\" (1.676 m)   Wt 182 lb 6 oz (82.7 kg)   SpO2 98%   BMI 29.44 kg/m²   Wt Readings from Last 3 Encounters:   03/17/22 182 lb 6 oz (82.7 kg)   11/02/21 176 lb (79.8 kg)   05/21/21 175 lb (79.4 kg)     There were no vitals filed for this visit. Physical Exam  Vitals and nursing note reviewed. Constitutional:       General: She is not in acute distress. Appearance: Normal appearance. She is well-developed, well-groomed and overweight. She is not ill-appearing, toxic-appearing or diaphoretic. HENT:      Head: Normocephalic and atraumatic. Right Ear: Tympanic membrane, ear canal and external ear normal.      Left Ear: Tympanic membrane, ear canal and external ear normal.      Nose: Nose normal.      Mouth/Throat:      Lips: Pink. Mouth: Mucous membranes are moist. No oral lesions. Tongue: No lesions. Palate: No mass and lesions. Pharynx: Oropharynx is clear. Uvula midline.  No pharyngeal swelling, oropharyngeal exudate, posterior oropharyngeal erythema or uvula swelling. Tonsils: 1+ on the right. 1+ on the left. Eyes:      General: Lids are normal. Vision grossly intact. No scleral icterus. Extraocular Movements: Extraocular movements intact. Conjunctiva/sclera: Conjunctivae normal.      Pupils: Pupils are equal, round, and reactive to light. Neck:      Thyroid: No thyroid mass or thyromegaly. Vascular: No carotid bruit or JVD. Trachea: Trachea and phonation normal.   Cardiovascular:      Rate and Rhythm: Normal rate and regular rhythm. Pulses: Normal pulses. Radial pulses are 2+ on the right side and 2+ on the left side. Posterior tibial pulses are 2+ on the right side and 2+ on the left side. Heart sounds: Normal heart sounds. No murmur heard. No friction rub. No gallop. Pulmonary:      Effort: Pulmonary effort is normal. No accessory muscle usage. Breath sounds: Normal breath sounds. No decreased breath sounds, wheezing, rhonchi or rales. Chest:   Breasts:      Right: No supraclavicular adenopathy. Left: No supraclavicular adenopathy. Abdominal:      General: Abdomen is flat. Bowel sounds are normal. There is no distension. Palpations: Abdomen is soft. There is no hepatomegaly, splenomegaly or mass. Tenderness: There is no abdominal tenderness. There is no guarding or rebound. Hernia: No hernia is present. Musculoskeletal:         General: Normal range of motion. Right wrist: Normal.      Left wrist: Normal.      Cervical back: Normal range of motion and neck supple. Right lower leg: No edema. Left lower leg: No edema. Right ankle: Normal.      Left ankle: Normal.   Lymphadenopathy:      Head:      Right side of head: No submandibular adenopathy. Left side of head: No submandibular adenopathy. Cervical: No cervical adenopathy. Right cervical: No superficial, deep or posterior cervical adenopathy.      Left 03/15/2022    CO2 24 03/15/2022    BUN 13 03/15/2022    CREATININE 0.6 03/15/2022    GLUCOSE 97 03/15/2022    CALCIUM 9.1 03/15/2022    PROT 6.9 03/15/2022    LABALBU 4.4 03/15/2022    BILITOT <0.2 03/15/2022    ALKPHOS 69 03/15/2022    AST 14 03/15/2022    ALT 14 03/15/2022    LABGLOM >60 03/15/2022    GFRAA >59 03/15/2022    GLOB 2.5 12/08/2016     Lab Results   Component Value Date    CHOL 208 (H) 03/15/2022    CHOL 222 (H) 01/19/2021    CHOL 210 (H) 11/19/2019     Lab Results   Component Value Date    TRIG 115 03/15/2022    TRIG 113 01/19/2021    TRIG 91 11/19/2019     Lab Results   Component Value Date    HDL 57 (L) 03/15/2022    HDL 70 01/19/2021    HDL 63 (L) 11/19/2019     Lab Results   Component Value Date    LDLCALC 128 03/15/2022    LDLCALC 129 01/19/2021    LDLCALC 129 11/19/2019     Lab Results   Component Value Date    TSHFT4 4.98 06/15/2021    TSH 3.210 03/15/2022     Lab Results   Component Value Date    VITD25 33.2 03/15/2022       Assessment   Plan   1. Encounter for well adult exam with abnormal findings  2. Acquired hypothyroidism  -     TSH with Reflex to FT4; Future  3. PREM (generalized anxiety disorder)  4. Facet arthritis, degenerative, L5-S1 level, lumbosacral spine  5. Mild tobacco abuse  6. Vitamin D deficiency  7. Need for prophylactic vaccination with tetanus-diphtheria (Td)  -     Tdap (age 6y and older) IM (Boostrix)  8. Overweight (BMI 25.0-29. 9)     Labs reviewed with patient. Refills Provided. Hypothyroidism well controlled. Lab work reviewed with patient today. Generalized Anxiety Disorder well controlled. Encouraged Exercise and relaxation techniques for stress relief. Health Maintenance reviewed - breast exam performed by OB/GYN or General Surgery (advised of need to have breast exam in addition to mammogram), Tdap booster given, PAP smear up to date  -Return in about 3 months (around 6/17/2022) for thyroid, recheck mood.     Over 50% of the total visit time of 30 minutes

## 2022-03-18 LAB — ESTROGEN TOTAL: 116 PG/ML

## 2022-04-12 ASSESSMENT — ENCOUNTER SYMPTOMS: BACK PAIN: 1

## 2022-05-19 DIAGNOSIS — E03.9 ACQUIRED HYPOTHYROIDISM: ICD-10-CM

## 2022-05-19 RX ORDER — LEVOTHYROXINE SODIUM 0.07 MG/1
75 TABLET ORAL DAILY
Qty: 30 TABLET | Refills: 3 | Status: SHIPPED | OUTPATIENT
Start: 2022-05-19 | End: 2022-06-17 | Stop reason: SDUPTHER

## 2022-06-14 DIAGNOSIS — F41.1 GAD (GENERALIZED ANXIETY DISORDER): ICD-10-CM

## 2022-06-14 RX ORDER — DESVENLAFAXINE 25 MG/1
TABLET, EXTENDED RELEASE ORAL
Qty: 90 TABLET | Refills: 3 | Status: SHIPPED | OUTPATIENT
Start: 2022-06-14

## 2022-06-15 DIAGNOSIS — E03.9 ACQUIRED HYPOTHYROIDISM: ICD-10-CM

## 2022-06-15 LAB
T4 FREE: 1.2 NG/DL (ref 0.93–1.7)
TSH REFLEX FT4: 8.37 UIU/ML (ref 0.35–5.5)

## 2022-06-17 ENCOUNTER — OFFICE VISIT (OUTPATIENT)
Dept: FAMILY MEDICINE CLINIC | Age: 42
End: 2022-06-17
Payer: OTHER GOVERNMENT

## 2022-06-17 VITALS
OXYGEN SATURATION: 97 % | DIASTOLIC BLOOD PRESSURE: 76 MMHG | WEIGHT: 185 LBS | BODY MASS INDEX: 29.73 KG/M2 | HEART RATE: 98 BPM | HEIGHT: 66 IN | SYSTOLIC BLOOD PRESSURE: 124 MMHG | TEMPERATURE: 97.5 F

## 2022-06-17 DIAGNOSIS — E55.9 VITAMIN D DEFICIENCY: ICD-10-CM

## 2022-06-17 DIAGNOSIS — F41.1 GAD (GENERALIZED ANXIETY DISORDER): ICD-10-CM

## 2022-06-17 DIAGNOSIS — E03.9 ACQUIRED HYPOTHYROIDISM: Primary | ICD-10-CM

## 2022-06-17 DIAGNOSIS — R63.5 ABNORMAL WEIGHT GAIN: ICD-10-CM

## 2022-06-17 DIAGNOSIS — E66.3 OVERWEIGHT (BMI 25.0-29.9): ICD-10-CM

## 2022-06-17 PROCEDURE — 99214 OFFICE O/P EST MOD 30 MIN: CPT | Performed by: INTERNAL MEDICINE

## 2022-06-17 RX ORDER — LEVOTHYROXINE SODIUM 0.07 MG/1
75 TABLET ORAL DAILY
Qty: 90 TABLET | Refills: 1 | Status: SHIPPED | OUTPATIENT
Start: 2022-06-17 | End: 2022-09-15

## 2022-06-17 ASSESSMENT — ENCOUNTER SYMPTOMS
SORE THROAT: 0
DIARRHEA: 0
COUGH: 0
BLOOD IN STOOL: 0
SINUS PRESSURE: 0
CHEST TIGHTNESS: 0
SHORTNESS OF BREATH: 0
EYE DISCHARGE: 0
ABDOMINAL PAIN: 0
COLOR CHANGE: 0
RHINORRHEA: 0
WHEEZING: 0
VOICE CHANGE: 0
VOMITING: 0
EYE REDNESS: 0
EYE PAIN: 0

## 2022-06-17 NOTE — PROGRESS NOTES
Amaya Alvarez is a 40 y.o. female who presents today for   Chief Complaint   Patient presents with    Thyroid Problem    Other     recheck mood       HPI  41 y/o WF here for f/u on generalized anxiety disorder and acquired hypothyroidism. Patient started taking iodine 25 mg at bedtime for the past month to help with thyroid because she has still been having difficulty losing weight but she has been more tired past few weeks but last menses was better. Her TSH was actually moderately elevated on labs this week since she has started the iodine. Patient is concerned her \"adrenal function\" may be abnormal. Patient feels anxiety is well controlled with current dose of desvenlafaxine. BMI Readings from Last 3 Encounters:   06/17/22 29.86 kg/m²   03/17/22 29.44 kg/m²   11/02/21 28.41 kg/m²     Wt Readings from Last 3 Encounters:   06/17/22 185 lb (83.9 kg)   03/17/22 182 lb 6 oz (82.7 kg)   11/02/21 176 lb (79.8 kg)     Hypothyroidism  Patient presents for follow up on thyroid function. Symptoms consist of fatigue, weight gain. The problem has been gradually worsening. Patient has been compliant with levothyroxine. Lab Results   Component Value Date    TSHFT4 8.37 (H) 06/15/2022    TSH 3.210 03/15/2022       Review of Systems   Constitutional: Positive for fatigue and unexpected weight change. Negative for activity change, appetite change, chills and fever. HENT: Negative for ear pain, rhinorrhea, sinus pressure, sore throat and voice change. Eyes: Negative for pain, discharge and redness. Respiratory: Negative for cough, chest tightness, shortness of breath and wheezing. Cardiovascular: Negative for chest pain and palpitations. Gastrointestinal: Negative for abdominal pain, blood in stool, diarrhea and vomiting. Endocrine: Negative for cold intolerance, heat intolerance and polydipsia. Genitourinary: Positive for menstrual problem. Negative for dysuria and hematuria.    Musculoskeletal: Negative for arthralgias, myalgias, neck pain and neck stiffness. Skin: Negative for color change and rash. Neurological: Negative for dizziness, tremors, syncope, speech difficulty, weakness, numbness and headaches. Hematological: Negative for adenopathy. Does not bruise/bleed easily. Psychiatric/Behavioral: Negative for confusion, dysphoric mood, self-injury, sleep disturbance and suicidal ideas. The patient is nervous/anxious. All other systems reviewed and are negative. Past Medical History:   Diagnosis Date    Adjustment disorder with mixed anxiety and depressed mood     Allergic rhinitis, cause unspecified     Anxiety state, unspecified     COVID-19 virus infection 12/16/2021    Symptoms started 12/8/21    Facet arthritis, degenerative, L5-S1 level, lumbosacral spine 11/2/2021    Palpitations     Paroxysmal SVT (supraventricular tachycardia) ([facility] [address] 09/03/2014    Radial styloid tenosynovitis     wrist pain while breast feeding. Current Outpatient Medications   Medication Sig Dispense Refill    levothyroxine (SYNTHROID) 75 MCG tablet Take 1 tablet by mouth daily 90 tablet 1    desvenlafaxine succinate (PRISTIQ) 25 MG TB24 extended release tablet TAKE ONE TABLET DAILY GENERIC FOR PRISTIQ 90 tablet 3    busPIRone (BUSPAR) 5 MG tablet TAKE ONE (1) TO TWO (2) TABLETS EVERY EIGHT HOURS AS NEEDED ANXIETY AND PANIC GENERIC FOR BUSPAR  60 tablet 3    vitamin D (CHOLECALCIFEROL) 50 MCG (2000 UT) TABS tablet Take 1 tablet by mouth daily 90 tablet 3    esomeprazole (NEXIUM 24HR) 20 MG delayed release capsule Take 1 capsule by mouth daily as needed (stomach pain/reflux) 30 capsule 3     No current facility-administered medications for this visit.        No Known Allergies    Past Surgical History:   Procedure Laterality Date    WISDOM TOOTH EXTRACTION         Social History     Tobacco Use    Smoking status: Light Tobacco Smoker     Packs/day: 0.25     Years: 20.00     Pack years: 5.00 Types: Cigarettes    Smokeless tobacco: Never Used    Tobacco comment: 4 cigarettes per day   Vaping Use    Vaping Use: Never used   Substance Use Topics    Alcohol use: Yes     Comment: 2 drinks per week    Drug use: No       Family History   Problem Relation Age of Onset    High Blood Pressure Mother     Cancer Mother         endometrial cancer    High Blood Pressure Father     Diabetes Father     Stroke Maternal Grandfather     Heart Disease Maternal Grandfather     Colon Polyps Maternal Grandfather         benign adenomatous polyp of large intestine    Diabetes Maternal Grandfather        /76   Pulse 98   Temp 97.5 °F (36.4 °C)   Ht 5' 6\" (1.676 m)   Wt 185 lb (83.9 kg)   SpO2 97%   BMI 29.86 kg/m²     Physical Exam  Vitals reviewed. Constitutional:       General: She is not in acute distress. Appearance: Normal appearance. She is well-developed, well-groomed and overweight. She is not ill-appearing or toxic-appearing. HENT:      Head: Normocephalic and atraumatic. Right Ear: External ear normal.      Left Ear: External ear normal.      Nose: Nose normal.      Mouth/Throat:      Lips: Pink. Mouth: Mucous membranes are moist.   Eyes:      General:         Right eye: No discharge. Left eye: No discharge. Conjunctiva/sclera: Conjunctivae normal.      Pupils: Pupils are equal, round, and reactive to light. Neck:      Thyroid: No thyromegaly. Vascular: Normal carotid pulses. No carotid bruit or JVD. Trachea: Trachea and phonation normal. No tracheal tenderness. Cardiovascular:      Rate and Rhythm: Normal rate and regular rhythm. Pulses:           Carotid pulses are 2+ on the right side and 2+ on the left side. Posterior tibial pulses are 2+ on the right side and 2+ on the left side. Heart sounds: Normal heart sounds. No murmur heard. No friction rub. No gallop.     Pulmonary:      Effort: Pulmonary effort is normal. No accessory muscle usage. Breath sounds: Normal breath sounds. No decreased breath sounds, wheezing, rhonchi or rales. Chest:   Breasts:      Right: No supraclavicular adenopathy. Left: No supraclavicular adenopathy. Abdominal:      General: Bowel sounds are normal. There is no distension. Palpations: Abdomen is soft. There is no mass. Tenderness: There is no abdominal tenderness. There is no guarding or rebound. Hernia: No hernia is present. Musculoskeletal:         General: No swelling, tenderness or deformity. Right wrist: Normal.      Left wrist: Normal.      Cervical back: Normal range of motion and neck supple. No rigidity. No muscular tenderness. Right lower leg: No edema. Left lower leg: No edema. Right ankle: Normal.      Left ankle: Normal.   Lymphadenopathy:      Cervical: No cervical adenopathy. Upper Body:      Right upper body: No supraclavicular adenopathy. Left upper body: No supraclavicular adenopathy. Skin:     General: Skin is warm. Capillary Refill: Capillary refill takes less than 2 seconds. Coloration: Skin is not cyanotic. Findings: No rash. Nails: There is no clubbing. Neurological:      Mental Status: She is alert and oriented to person, place, and time. Cranial Nerves: No cranial nerve deficit or dysarthria. Motor: No weakness, tremor or abnormal muscle tone. Coordination: Coordination normal.      Gait: Gait is intact. Comments: CN II-XII grossly intact, speech clear, no facial droop, MAEW   Psychiatric:         Attention and Perception: Attention and perception normal.         Mood and Affect: Mood and affect normal.         Speech: Speech normal.         Behavior: Behavior normal. Behavior is cooperative. Thought Content:  Thought content normal.         Cognition and Memory: Cognition and memory normal.         Judgment: Judgment normal.         Lab Results   Component Value Date VITD25 33.2 03/15/2022     Assessment:    ICD-10-CM    1. Acquired hypothyroidism  E03.9 TSH     T4, Free     Thyroid Peroxidase Antibody     Thyroid Stimulating Immunoglobulin     levothyroxine (SYNTHROID) 75 MCG tablet   2. Abnormal weight gain  R63.5 Cortisol AM, Total     Insulin, total   3. PREM (generalized anxiety disorder)  F41.1    4. Vitamin D deficiency  E55.9 Vitamin D 25 Hydroxy   5. Overweight (BMI 25.0-29. 9)  E66.3        Plan:  Barbara See was seen today for thyroid problem and other. Diagnoses and all orders for this visit:    Acquired hypothyroidism  -     TSH; Future  -     T4, Free; Future  -     Thyroid Peroxidase Antibody; Future  -     Thyroid Stimulating Immunoglobulin; Future  -     levothyroxine (SYNTHROID) 75 MCG tablet; Take 1 tablet by mouth daily    Abnormal weight gain  -     Cortisol AM, Total; Future  -     Insulin, total; Future    PREM (generalized anxiety disorder)    Vitamin D deficiency  -     Vitamin D 25 Hydroxy; Future    Overweight (BMI 25.0-29. 9)    Labs reviewed with patient. Refills Provided.   -Acquired Hypothyroidism not well controlled. Medication adjusted today as follows patient requests to stop iodine and then recheck TSH prior to increasing dose of levothyroxine  -Generalized Anxiety Disorder well controlled. Encouraged Exercise and relaxation techniques for stress relief.    -additional blood work ordered to evaluate for issues with abnormal weight gain/difficulty losing weight  -Return in about 4 months (around 10/17/2022) for recheck mood, thyroid.         Orders Placed This Encounter   Procedures    TSH     Standing Status:   Future     Standing Expiration Date:   6/17/2023    T4, Free     Standing Status:   Future     Standing Expiration Date:   6/17/2023    Cortisol AM, Total     Standing Status:   Future     Standing Expiration Date:   6/17/2023    Insulin, total     Standing Status:   Future     Standing Expiration Date:   6/17/2023    Thyroid Peroxidase Antibody     Standing Status:   Future     Standing Expiration Date:   6/17/2023    Thyroid Stimulating Immunoglobulin     Standing Status:   Future     Standing Expiration Date:   6/17/2023    Vitamin D 25 Hydroxy     Standing Status:   Future     Standing Expiration Date:   6/17/2023     Orders Placed This Encounter   Medications    levothyroxine (SYNTHROID) 75 MCG tablet     Sig: Take 1 tablet by mouth daily     Dispense:  90 tablet     Refill:  1     Medications Discontinued During This Encounter   Medication Reason    levothyroxine (SYNTHROID) 75 MCG tablet REORDER     There are no Patient Instructions on file for this visit. Patient voices understanding and agrees to plans along with risks and benefits of plan. Counseling:  Niyah Ruiz's case, medications and options were discussed in detail. patient was instructed tocall the office if she   questions regarding her treatment. Should her conditions worsen, she should return to office to be reassessed by Dr. Lionel Emery. she  Should to go the closest Emergency Department for any emergency. They verbalized understanding the above instructions.

## 2022-08-17 ENCOUNTER — OFFICE VISIT (OUTPATIENT)
Dept: OBGYN CLINIC | Age: 42
End: 2022-08-17
Payer: OTHER GOVERNMENT

## 2022-08-17 VITALS
HEART RATE: 89 BPM | SYSTOLIC BLOOD PRESSURE: 136 MMHG | WEIGHT: 187 LBS | BODY MASS INDEX: 30.18 KG/M2 | DIASTOLIC BLOOD PRESSURE: 78 MMHG

## 2022-08-17 DIAGNOSIS — Z01.419 WELL WOMAN EXAM WITH ROUTINE GYNECOLOGICAL EXAM: Primary | ICD-10-CM

## 2022-08-17 DIAGNOSIS — Z12.4 SCREENING FOR CERVICAL CANCER: ICD-10-CM

## 2022-08-17 DIAGNOSIS — Z12.39 ENCOUNTER FOR SCREENING BREAST EXAMINATION: ICD-10-CM

## 2022-08-17 DIAGNOSIS — Z12.31 ENCOUNTER FOR SCREENING MAMMOGRAM FOR MALIGNANT NEOPLASM OF BREAST: ICD-10-CM

## 2022-08-17 PROCEDURE — 99396 PREV VISIT EST AGE 40-64: CPT | Performed by: NURSE PRACTITIONER

## 2022-08-17 ASSESSMENT — ENCOUNTER SYMPTOMS
ALLERGIC/IMMUNOLOGIC NEGATIVE: 1
RESPIRATORY NEGATIVE: 1
GASTROINTESTINAL NEGATIVE: 1
EYES NEGATIVE: 1

## 2022-08-17 NOTE — PROGRESS NOTES
[facility]  CNM Office Note    Tevin Vang is a 40 y.o. female who presents today for her medical conditions/ complaints as noted below. Chief Complaint   Patient presents with    Annual Exam         HPI  Pt presents today for pap smear and breast exam. Pt c/o heavy periods. Periods are regular with duration of 3-7 days. No vasomotor symptoms     Last mammogram:  2016  Last pap smear:  2021  Contraception:  none  :  2  Para:  2  AB:  0  Last bone density:  never  Last colonoscopy:   never  Patient Active Problem List   Diagnosis    Heart palpitations    Allergic rhinitis    PREM (generalized anxiety disorder)    Dysmenorrhea    Mild tobacco abuse    Overweight (BMI 25.0-29. 9)    Acquired hypothyroidism    Vitamin D deficiency    Facet arthritis, degenerative, L5-S1 level, lumbosacral spine       Patient's last menstrual period was 2022. No obstetric history on file. Past Medical History:   Diagnosis Date    Adjustment disorder with mixed anxiety and depressed mood     Allergic rhinitis, cause unspecified     Anxiety state, unspecified     COVID-19 virus infection 2021    Symptoms started 21    Facet arthritis, degenerative, L5-S1 level, lumbosacral spine 2021    Palpitations     Paroxysmal SVT (supraventricular tachycardia) (HCC) 2014    Radial styloid tenosynovitis     wrist pain while breast feeding.      Past Surgical History:   Procedure Laterality Date    WISDOM TOOTH EXTRACTION       Family History   Problem Relation Age of Onset    High Blood Pressure Mother     Cancer Mother         endometrial cancer    High Blood Pressure Father     Diabetes Father     Stroke Maternal Grandfather     Heart Disease Maternal Grandfather     Colon Polyps Maternal Grandfather         benign adenomatous polyp of large intestine    Diabetes Maternal Grandfather      Social History     Tobacco Use    Smoking status: Light Smoker     Packs/day: 0.25     Years: 20.00 Pack years: 5.00     Types: Cigarettes    Smokeless tobacco: Never    Tobacco comments:     4 cigarettes per day   Substance Use Topics    Alcohol use: Yes     Comment: 2 drinks per week       Current Outpatient Medications   Medication Sig Dispense Refill    levothyroxine (SYNTHROID) 75 MCG tablet Take 1 tablet by mouth daily 90 tablet 1    desvenlafaxine succinate (PRISTIQ) 25 MG TB24 extended release tablet TAKE ONE TABLET DAILY GENERIC FOR PRISTIQ 90 tablet 3    busPIRone (BUSPAR) 5 MG tablet TAKE ONE (1) TO TWO (2) TABLETS EVERY EIGHT HOURS AS NEEDED ANXIETY AND PANIC GENERIC FOR BUSPAR  60 tablet 3    esomeprazole (NEXIUM 24HR) 20 MG delayed release capsule Take 1 capsule by mouth daily as needed (stomach pain/reflux) 30 capsule 3     No current facility-administered medications for this visit. No Known Allergies  Vitals:    08/17/22 1437   BP: 136/78   Pulse: 89     Body mass index is 30.18 kg/m². Review of Systems   Constitutional: Negative. HENT: Negative. Eyes: Negative. Respiratory: Negative. Cardiovascular: Negative. Gastrointestinal: Negative. Endocrine: Negative. Negative for heat intolerance. Genitourinary:  Positive for menstrual problem. Negative for dyspareunia, flank pain, pelvic pain, vaginal bleeding, vaginal discharge and vaginal pain. Musculoskeletal: Negative. Skin: Negative. Allergic/Immunologic: Negative. Neurological: Negative. Hematological: Negative. Psychiatric/Behavioral: Negative. Physical Exam  Vitals and nursing note reviewed. Constitutional:       Appearance: She is well-developed. HENT:      Head: Normocephalic and atraumatic. Eyes:      Conjunctiva/sclera: Conjunctivae normal.      Pupils: Pupils are equal, round, and reactive to light. Neck:      Thyroid: No thyromegaly. Trachea: No tracheal deviation. Cardiovascular:      Rate and Rhythm: Normal rate and regular rhythm.       Heart sounds: Normal heart sounds. No murmur heard. Pulmonary:      Effort: Pulmonary effort is normal. No respiratory distress. Breath sounds: Normal breath sounds. Chest:      Comments: Breasts symmetrical without tenderness, masses, or nipple discharge. Nipples everted bilaterally. Abdominal:      General: There is no distension. Palpations: Abdomen is soft. Tenderness: There is no abdominal tenderness. There is no guarding. Genitourinary:     General: Normal vulva. Exam position: Lithotomy position. Labia:         Right: No rash or lesion. Left: No rash or lesion. Vagina: Normal. No erythema or lesions. Cervix: Normal.      Uterus: Normal. Not tender. Adnexa: Right adnexa normal and left adnexa normal.        Right: No mass, tenderness or fullness. Left: No mass, tenderness or fullness. Musculoskeletal:         General: Normal range of motion. Cervical back: Normal range of motion and neck supple. Skin:     General: Skin is warm and dry. Capillary Refill: Capillary refill takes less than 2 seconds. Neurological:      Mental Status: She is alert and oriented to person, place, and time. Psychiatric:         Behavior: Behavior normal.         Thought Content: Thought content normal.         Judgment: Judgment normal.        Diagnosis Orders   1. Well woman exam with routine gynecological exam  PAP SMEAR    Human papillomavirus (HPV) DNA probe thin prep high risk      2. Screening for cervical cancer  PAP SMEAR    Human papillomavirus (HPV) DNA probe thin prep high risk      3. Encounter for screening breast examination        4. Encounter for screening mammogram for malignant neoplasm of breast  RAJAN DIGITAL SCREEN W OR WO CAD BILATERAL          MEDICATIONS:  No orders of the defined types were placed in this encounter.       ORDERS:  Orders Placed This Encounter   Procedures    RAJAN DIGITAL SCREEN W OR WO CAD BILATERAL    PAP SMEAR    Human papillomavirus (HPV) DNA probe thin prep high risk       PLAN:  WWE- pap collected, SBE reviewed and CBE performed. Mammogram ordered.

## 2022-08-17 NOTE — PATIENT INSTRUCTIONS
Patient Education        Well Visit, Ages 25 to 48: Care Instructions  Overview     Well visits can help you stay healthy. Your doctor has checked your overall health and may have suggested ways to take good care of yourself. Your doctor also may have recommended tests. At home, you can help prevent illness withhealthy eating, regular exercise, and other steps. Follow-up care is a key part of your treatment and safety. Be sure to make and go to all appointments, and call your doctor if you are having problems. It's also a good idea to know your test results and keep alist of the medicines you take. How can you care for yourself at home? Get screening tests that you and your doctor decide on. Screening helps find diseases before any symptoms appear. Eat healthy foods. Choose fruits, vegetables, whole grains, protein, and low-fat dairy foods. Limit fat, especially saturated fat. Reduce salt in your diet. Limit alcohol. If you are a man, have no more than 2 drinks a day or 14 drinks a week. If you are a woman, have no more than 1 drink a day or 7 drinks a week. Get at least 30 minutes of physical activity on most days of the week. Walking is a good choice. You also may want to do other activities, such as running, swimming, cycling, or playing tennis or team sports. Discuss any changes in your exercise program with your doctor. Reach and stay at a healthy weight. This will lower your risk for many problems, such as obesity, diabetes, heart disease, and high blood pressure. Do not smoke or allow others to smoke around you. If you need help quitting, talk to your doctor about stop-smoking programs and medicines. These can increase your chances of quitting for good. Care for your mental health. It is easy to get weighed down by worry and stress. Learn strategies to manage stress, like deep breathing and mindfulness, and stay connected with your family and community.  If you find you often feel sad or hopeless, talk with your doctor. Treatment can help. Talk to your doctor about whether you have any risk factors for sexually transmitted infections (STIs). You can help prevent STIs if you wait to have sex with a new partner (or partners) until you've each been tested for STIs. It also helps if you use condoms (male or female condoms) and if you limit your sex partners to one person who only has sex with you. Vaccines are available for some STIs, such as HPV. Use birth control if it's important to you to prevent pregnancy. Talk with your doctor about the choices available and what might be best for you. If you think you may have a problem with alcohol or drug use, talk to your doctor. This includes prescription medicines (such as amphetamines and opioids) and illegal drugs (such as cocaine and methamphetamine). Your doctor can help you figure out what type of treatment is best for you. Protect your skin from too much sun. When you're outdoors from 10 a.m. to 4 p.m., stay in the shade or cover up with clothing and a hat with a wide brim. Wear sunglasses that block UV rays. Even when it's cloudy, put broad-spectrum sunscreen (SPF 30 or higher) on any exposed skin. See a dentist one or two times a year for checkups and to have your teeth cleaned. Wear a seat belt in the car. When should you call for help? Watch closely for changes in your health, and be sure to contact your doctor if you have any problems or symptoms that concern you. Where can you learn more? Go to https://mercedes.healthHealthways. org and sign in to your [employer] account. Enter P072 in the [employer]D box to learn more about \"Well Visit, Ages 25 to 48: Care Instructions. \"     If you do not have an account, please click on the \"Sign Up Now\" link. Current as of: October 6, 2021               Content Version: 13.3  © 4548-9184 Healthwise, Incorporated. Care instructions adapted under license by [facility] ([facility]).  If you have questions about a medical condition or this instruction, always ask your healthcare professional. [address] any warranty or liability for your use of this information. Patient Education        Breast Self-Exam: Care Instructions  Your Care Instructions     A breast self-exam is when you check your breasts for lumps or changes. This regular exam helps you learn how your breasts normally look and feel. Mostbreast problems or changes are not because of cancer. Breast self-exam is not a substitute for a mammogram. Having regular breast exams by your doctor and regular mammograms improve your chances of finding anyproblems with your breasts. Some women set a time each month to do a step-by-step breast self-exam. Other women like a less formal system. They might look at their breasts as they brushtheir teeth, or feel their breasts once in a while in the shower. If you notice a change in your breast, tell your doctor. Follow-up care is a key part of your treatment and safety. Be sure to make and go to all appointments, and call your doctor if you are having problems. It's also a good idea to know your test results and keep alist of the medicines you take. How do you do a breast self-exam?  The best time to examine your breasts is usually one week after your menstrual period begins. Your breasts should not be tender then. If you do not have periods, you might do your exam on a day of the month that is easy to remember. To examine your breasts:  Remove all your clothes above the waist and lie down. When you are lying down, your breast tissue spreads evenly over your chest wall, which makes it easier to feel all your breast tissue. Use the pads--not the fingertips--of the 3 middle fingers of your left hand to check your right breast. Move your fingers slowly in small coin-sized circles that overlap. Use three levels of pressure to feel of all your breast tissue.  Use light pressure to feel the tissue close to the skin surface. Use medium pressure to feel a little deeper. Use firm pressure to feel your tissue close to your breastbone and ribs. Use each pressure level to feel your breast tissue before moving on to the next spot. Check your entire breast, moving up and down as if following a strip from the collarbone to the bra line, and from the armpit to the ribs. Repeat until you have covered the entire breast.  Repeat this procedure for your left breast, using the pads of the 3 middle fingers of your right hand. To examine your breasts while in the shower:  Place one arm over your head and lightly soap your breast on that side. Using the pads of your fingers, gently move your hand over your breast (in the strip pattern described above), feeling carefully for any lumps or changes. Repeat for the other breast.  Have your doctor inspect anything you notice to see if you need further testing. Where can you learn more? Go to [email or web address]://[email or web address]. org and sign in to your [employer] account. Enter P148 in the [employer] PF box to learn more about \"Breast Self-Exam: Care Instructions. \"     If you do not have an account, please click on the \"Sign Up Now\" link. Current as of: September 8, 2021               Content Version: 13.3  © 2006-2022 Healthwise, Incorporated. Care instructions adapted under license by [facility] ([facility]). If you have questions about a medical condition or this instruction, always ask your healthcare professional. [place] [address] any warranty or liability for your use of this information.

## 2022-08-26 ENCOUNTER — TELEPHONE (OUTPATIENT)
Dept: OBGYN CLINIC | Age: 42
End: 2022-08-26

## 2022-10-14 DIAGNOSIS — E55.9 VITAMIN D DEFICIENCY: ICD-10-CM

## 2022-10-14 DIAGNOSIS — E03.9 ACQUIRED HYPOTHYROIDISM: ICD-10-CM

## 2022-10-20 DIAGNOSIS — R63.5 ABNORMAL WEIGHT GAIN: ICD-10-CM

## 2022-10-20 LAB
CORTISOL - AM: 9.6 UG/DL (ref 6.2–19.4)
INSULIN: 14.6 MU/L (ref 2.6–24.9)

## 2022-10-21 LAB
T4 FREE: 1.02 NG/DL (ref 0.93–1.7)
THYROID PEROXIDASE (TPO) ABS: <0.3 IU/ML (ref 0–9)
TSH SERPL DL<=0.05 MIU/L-ACNC: 2.43 UIU/ML (ref 0.27–4.2)
VITAMIN D 25-HYDROXY: 40.3 NG/ML

## 2022-10-22 LAB — THYROID STIMULATING IMMUNOGLOBULIN: <0.1 IU/L

## 2023-04-17 DIAGNOSIS — F41.1 GAD (GENERALIZED ANXIETY DISORDER): ICD-10-CM

## 2023-04-17 DIAGNOSIS — F41.0 PANIC DISORDER WITHOUT AGORAPHOBIA: ICD-10-CM

## 2023-04-17 RX ORDER — BUSPIRONE HYDROCHLORIDE 5 MG/1
TABLET ORAL
Qty: 60 TABLET | Refills: 3 | Status: SHIPPED | OUTPATIENT
Start: 2023-04-17

## 2023-04-17 NOTE — TELEPHONE ENCOUNTER
Renu Browning called to request a refill on her medication.       Last office visit : Visit date not found   Next office visit : Visit date not found     Requested Prescriptions     Pending Prescriptions Disp Refills    busPIRone (BUSPAR) 5 MG tablet [Pharmacy Med Name: BUSPIRONE HYDROCHLORIDE 5MG TABLET] 60 tablet 3     Sig: TAKE ONE (1) TO TWO (2) TABLETS EVERY EIGHT HOURS AS NEEDED ANXIETY AND PANIC GENERIC FOR BUSPAR            Christina Feng MA

## 2023-05-02 ENCOUNTER — OFFICE VISIT (OUTPATIENT)
Dept: PRIMARY CARE CLINIC | Age: 43
End: 2023-05-02
Payer: OTHER GOVERNMENT

## 2023-05-02 VITALS
TEMPERATURE: 98.2 F | SYSTOLIC BLOOD PRESSURE: 136 MMHG | WEIGHT: 182 LBS | HEART RATE: 91 BPM | DIASTOLIC BLOOD PRESSURE: 98 MMHG | OXYGEN SATURATION: 100 % | BODY MASS INDEX: 29.38 KG/M2

## 2023-05-02 DIAGNOSIS — J01.00 ACUTE MAXILLARY SINUSITIS, RECURRENCE NOT SPECIFIED: ICD-10-CM

## 2023-05-02 DIAGNOSIS — J34.89 NASAL VESTIBULITIS: Primary | ICD-10-CM

## 2023-05-02 PROCEDURE — 99213 OFFICE O/P EST LOW 20 MIN: CPT | Performed by: NURSE PRACTITIONER

## 2023-05-02 RX ORDER — CEPHALEXIN 500 MG/1
500 CAPSULE ORAL 2 TIMES DAILY
Qty: 14 CAPSULE | Refills: 0 | Status: SHIPPED | OUTPATIENT
Start: 2023-05-02 | End: 2023-05-09

## 2023-05-02 SDOH — ECONOMIC STABILITY: INCOME INSECURITY: HOW HARD IS IT FOR YOU TO PAY FOR THE VERY BASICS LIKE FOOD, HOUSING, MEDICAL CARE, AND HEATING?: NOT HARD AT ALL

## 2023-05-02 SDOH — ECONOMIC STABILITY: FOOD INSECURITY: WITHIN THE PAST 12 MONTHS, YOU WORRIED THAT YOUR FOOD WOULD RUN OUT BEFORE YOU GOT MONEY TO BUY MORE.: NEVER TRUE

## 2023-05-02 SDOH — ECONOMIC STABILITY: FOOD INSECURITY: WITHIN THE PAST 12 MONTHS, THE FOOD YOU BOUGHT JUST DIDN'T LAST AND YOU DIDN'T HAVE MONEY TO GET MORE.: NEVER TRUE

## 2023-05-02 SDOH — ECONOMIC STABILITY: TRANSPORTATION INSECURITY
IN THE PAST 12 MONTHS, HAS LACK OF TRANSPORTATION KEPT YOU FROM MEETINGS, WORK, OR FROM GETTING THINGS NEEDED FOR DAILY LIVING?: NO

## 2023-05-02 SDOH — ECONOMIC STABILITY: HOUSING INSECURITY
IN THE LAST 12 MONTHS, WAS THERE A TIME WHEN YOU DID NOT HAVE A STEADY PLACE TO SLEEP OR SLEPT IN A SHELTER (INCLUDING NOW)?: NO

## 2023-05-02 ASSESSMENT — ENCOUNTER SYMPTOMS
EYES NEGATIVE: 1
GASTROINTESTINAL NEGATIVE: 1
RESPIRATORY NEGATIVE: 1
ALLERGIC/IMMUNOLOGIC NEGATIVE: 1

## 2023-05-02 NOTE — PROGRESS NOTES
[address] [facility]  [address] [place] [address]  [address] [address]  Dept: [phone]  Dept Fax: [phone]  Loc: [phone]    Dimitrios Marcus is a 44 y.o. female who presents today for her medical conditions/complaints as noted below. Dimitrios Marcus is c/o of Other (Has sore on inside of L/s nose. Causing swelling inside of nose that is moving under eye and cheek. )        HPI:   She is a patient of Dr. Walker Araujo that presents with complaints of sore in the left nostril. She reports yesterday there was swelling and soreness over the left side of her nose. She also reports puffiness under the bottom of her left eye. Denies any fever or cough. HPI   Chief Complaint   Patient presents with    Other     Has sore on inside of L/s nose. Causing swelling inside of nose that is moving under eye and cheek. Past Medical History:   Diagnosis Date    Adjustment disorder with mixed anxiety and depressed mood     Allergic rhinitis, cause unspecified     Anxiety state, unspecified     COVID-19 virus infection 12/16/2021    Symptoms started 12/8/21    Facet arthritis, degenerative, L5-S1 level, lumbosacral spine 11/2/2021    Palpitations     Paroxysmal SVT (supraventricular tachycardia) (HCC) 09/03/2014    Radial styloid tenosynovitis     wrist pain while breast feeding.       Past Surgical History:   Procedure Laterality Date    WISDOM TOOTH EXTRACTION         Vitals 5/2/2023 8/17/2022 6/17/2022 3/17/2022 11/2/2021 9/29/5933   SYSTOLIC [phone] 290   DIASTOLIC 98 78 76 80 76 85   Site Left Upper Arm - - - - -   Position - - - - - -   Cuff Size - - - - - -   Pulse 91 89 98 78 83 79   Temp 98.2 - 97.5 97 97.2 -   Resp - - - - - -   SpO2 100 - 97 98 100 -   Weight 182 lb 187 lb 185 lb 182 lb 6 oz 176 lb 175 lb   Height - - 5' 6\" 5' 6\" 5' 6\" 5' 7\"   Body Mass Index - - 29.86 kg/m2 29.43 kg/m2 28.4 kg/m2 27.41 kg/m2   Some recent data might be hidden       Family

## 2023-05-03 ASSESSMENT — ENCOUNTER SYMPTOMS: SINUS PRESSURE: 1

## 2023-05-13 DIAGNOSIS — E03.9 ACQUIRED HYPOTHYROIDISM: ICD-10-CM

## 2023-05-15 RX ORDER — LEVOTHYROXINE SODIUM 0.07 MG/1
75 TABLET ORAL DAILY
Qty: 30 TABLET | Refills: 3 | Status: SHIPPED | OUTPATIENT
Start: 2023-05-15 | End: 2023-08-13

## 2023-06-08 DIAGNOSIS — F41.1 GAD (GENERALIZED ANXIETY DISORDER): ICD-10-CM

## 2023-06-08 RX ORDER — DESVENLAFAXINE 25 MG/1
TABLET, EXTENDED RELEASE ORAL
Qty: 90 TABLET | Refills: 3 | Status: SHIPPED | OUTPATIENT
Start: 2023-06-08

## 2023-06-08 NOTE — TELEPHONE ENCOUNTER
Nyla Conde called to request a refill on her medication.       Last office visit : 5/2/2023   Next office visit : 8/18/2023     Requested Prescriptions     Pending Prescriptions Disp Refills    desvenlafaxine succinate (PRISTIQ) 25 MG TB24 extended release tablet [Pharmacy Med Name: DESVENLAFAXINE ER 25MG ER TABLET ER 24HR] 90 tablet 3     Sig: TAKE ONE TABLET DAILY GENERIC  [place], [place]

## 2023-08-15 ASSESSMENT — PATIENT HEALTH QUESTIONNAIRE - PHQ9
2. FEELING DOWN, DEPRESSED OR HOPELESS: 0
SUM OF ALL RESPONSES TO PHQ QUESTIONS 1-9: 1
1. LITTLE INTEREST OR PLEASURE IN DOING THINGS: 1
SUM OF ALL RESPONSES TO PHQ QUESTIONS 1-9: 1
SUM OF ALL RESPONSES TO PHQ9 QUESTIONS 1 & 2: 1
SUM OF ALL RESPONSES TO PHQ QUESTIONS 1-9: 1
SUM OF ALL RESPONSES TO PHQ QUESTIONS 1-9: 1

## 2023-08-17 DIAGNOSIS — E03.9 ACQUIRED HYPOTHYROIDISM: Primary | ICD-10-CM

## 2023-08-17 DIAGNOSIS — Z00.00 ROUTINE GENERAL MEDICAL EXAMINATION AT A HEALTH CARE FACILITY: ICD-10-CM

## 2023-08-23 DIAGNOSIS — E03.9 ACQUIRED HYPOTHYROIDISM: ICD-10-CM

## 2023-08-23 DIAGNOSIS — Z00.00 ROUTINE GENERAL MEDICAL EXAMINATION AT A HEALTH CARE FACILITY: ICD-10-CM

## 2023-08-23 LAB
ALBUMIN SERPL-MCNC: 4.7 G/DL (ref 3.5–5.2)
ALP SERPL-CCNC: 74 U/L (ref 35–104)
ALT SERPL-CCNC: 20 U/L (ref 5–33)
ANION GAP SERPL CALCULATED.3IONS-SCNC: 11 MMOL/L (ref 7–19)
AST SERPL-CCNC: 18 U/L (ref 5–32)
BASOPHILS # BLD: 0.1 K/UL (ref 0–0.2)
BASOPHILS NFR BLD: 1 % (ref 0–1)
BILIRUB SERPL-MCNC: <0.2 MG/DL (ref 0.2–1.2)
BUN SERPL-MCNC: 9 MG/DL (ref 6–20)
CALCIUM SERPL-MCNC: 9 MG/DL (ref 8.6–10)
CHLORIDE SERPL-SCNC: 102 MMOL/L (ref 98–111)
CHOLEST SERPL-MCNC: 218 MG/DL (ref 160–199)
CO2 SERPL-SCNC: 25 MMOL/L (ref 22–29)
CREAT SERPL-MCNC: 0.7 MG/DL (ref 0.5–0.9)
EOSINOPHIL # BLD: 0.1 K/UL (ref 0–0.6)
EOSINOPHIL NFR BLD: 1.8 % (ref 0–5)
ERYTHROCYTE [DISTWIDTH] IN BLOOD BY AUTOMATED COUNT: 13 % (ref 11.5–14.5)
GLUCOSE SERPL-MCNC: 106 MG/DL (ref 74–109)
HCT VFR BLD AUTO: 42.3 % (ref 37–47)
HDLC SERPL-MCNC: 66 MG/DL (ref 65–121)
HGB BLD-MCNC: 14 G/DL (ref 12–16)
IMM GRANULOCYTES # BLD: 0 K/UL
LDLC SERPL CALC-MCNC: 125 MG/DL
LYMPHOCYTES # BLD: 2.4 K/UL (ref 1.1–4.5)
LYMPHOCYTES NFR BLD: 39.1 % (ref 20–40)
MCH RBC QN AUTO: 32.6 PG (ref 27–31)
MCHC RBC AUTO-ENTMCNC: 33.1 G/DL (ref 33–37)
MCV RBC AUTO: 98.4 FL (ref 81–99)
MONOCYTES # BLD: 0.6 K/UL (ref 0–0.9)
MONOCYTES NFR BLD: 9.2 % (ref 0–10)
NEUTROPHILS # BLD: 3 K/UL (ref 1.5–7.5)
NEUTS SEG NFR BLD: 48.6 % (ref 50–65)
PLATELET # BLD AUTO: 366 K/UL (ref 130–400)
PMV BLD AUTO: 8.9 FL (ref 9.4–12.3)
POTASSIUM SERPL-SCNC: 4 MMOL/L (ref 3.5–5)
PROT SERPL-MCNC: 7.4 G/DL (ref 6.6–8.7)
RBC # BLD AUTO: 4.3 M/UL (ref 4.2–5.4)
SODIUM SERPL-SCNC: 138 MMOL/L (ref 136–145)
T4 FREE SERPL-MCNC: 1.28 NG/DL (ref 0.93–1.7)
TRIGL SERPL-MCNC: 134 MG/DL (ref 0–149)
TSH SERPL DL<=0.005 MIU/L-ACNC: 2.69 UIU/ML (ref 0.27–4.2)
WBC # BLD AUTO: 6.1 K/UL (ref 4.8–10.8)

## 2023-08-24 ENCOUNTER — OFFICE VISIT (OUTPATIENT)
Dept: PRIMARY CARE CLINIC | Age: 43
End: 2023-08-24
Payer: OTHER GOVERNMENT

## 2023-08-24 VITALS
TEMPERATURE: 98 F | HEART RATE: 90 BPM | BODY MASS INDEX: 27.31 KG/M2 | SYSTOLIC BLOOD PRESSURE: 122 MMHG | OXYGEN SATURATION: 96 % | DIASTOLIC BLOOD PRESSURE: 70 MMHG | WEIGHT: 174 LBS | HEIGHT: 67 IN

## 2023-08-24 DIAGNOSIS — Z00.00 ENCOUNTER FOR WELL ADULT EXAM WITHOUT ABNORMAL FINDINGS: Primary | ICD-10-CM

## 2023-08-24 DIAGNOSIS — E66.3 OVERWEIGHT (BMI 25.0-29.9): ICD-10-CM

## 2023-08-24 DIAGNOSIS — E03.9 ACQUIRED HYPOTHYROIDISM: ICD-10-CM

## 2023-08-24 DIAGNOSIS — Z87.891 PERSONAL HISTORY OF TOBACCO USE, PRESENTING HAZARDS TO HEALTH: ICD-10-CM

## 2023-08-24 DIAGNOSIS — F41.1 GAD (GENERALIZED ANXIETY DISORDER): ICD-10-CM

## 2023-08-24 PROCEDURE — 99396 PREV VISIT EST AGE 40-64: CPT | Performed by: INTERNAL MEDICINE

## 2023-08-24 PROCEDURE — 99406 BEHAV CHNG SMOKING 3-10 MIN: CPT | Performed by: INTERNAL MEDICINE

## 2023-08-24 RX ORDER — LEVOTHYROXINE SODIUM 0.07 MG/1
75 TABLET ORAL DAILY
Qty: 90 TABLET | Refills: 3 | Status: SHIPPED | OUTPATIENT
Start: 2023-08-24

## 2023-08-24 ASSESSMENT — ENCOUNTER SYMPTOMS
COUGH: 0
VOICE CHANGE: 0
CHEST TIGHTNESS: 0
RHINORRHEA: 0
DIARRHEA: 0
EYE PAIN: 0
WHEEZING: 0
COLOR CHANGE: 0
EYE REDNESS: 0
SINUS PRESSURE: 0
BLOOD IN STOOL: 0
ABDOMINAL PAIN: 0
SORE THROAT: 0
VOMITING: 0
EYE DISCHARGE: 0
SHORTNESS OF BREATH: 0

## 2023-08-24 ASSESSMENT — VISUAL ACUITY: OU: 1

## 2023-08-24 NOTE — PROGRESS NOTES
atrophy or abnormal muscle tone. Coordination: Coordination is intact. Romberg sign negative. Coordination normal.      Gait: Gait is intact. Deep Tendon Reflexes: Reflexes are normal and symmetric. Reflex Scores:       Brachioradialis reflexes are 2+ on the right side and 2+ on the left side. Patellar reflexes are 2+ on the right side and 2+ on the left side. Comments: CN II-XII grossly intact, speech clear, MAEW, no focal deficits   Psychiatric:         Attention and Perception: Attention and perception normal.         Mood and Affect: Mood and affect normal.         Speech: Speech normal.         Behavior: Behavior normal. Behavior is cooperative. Thought Content: Thought content normal.         Cognition and Memory: Cognition and memory normal.         Judgment: Judgment normal.     Lab Results   Component Value Date    WBC 6.1 08/23/2023    HGB 14.0 08/23/2023    HCT 42.3 08/23/2023    MCV 98.4 08/23/2023     08/23/2023    LYMPHOPCT 39.1 08/23/2023    RBC 4.30 08/23/2023    MCH 32.6 (H) 08/23/2023    MCHC 33.1 08/23/2023    RDW 13.0 08/23/2023     Lab Results   Component Value Date     08/23/2023    K 4.0 08/23/2023     08/23/2023    CO2 25 08/23/2023    BUN 9 08/23/2023    CREATININE 0.7 08/23/2023    GLUCOSE 106 08/23/2023    CALCIUM 9.0 08/23/2023    PROT 7.4 08/23/2023    LABALBU 4.7 08/23/2023    BILITOT <0.2 08/23/2023    ALKPHOS 74 08/23/2023    AST 18 08/23/2023    ALT 20 08/23/2023    LABGLOM >60 08/23/2023    GFRAA >59 03/15/2022    GLOB 2.5 12/08/2016        Lab Results   Component Value Date    TSH 2.690 08/23/2023    TSHFT4 8.37 (H) 06/15/2022           Assessment   Plan   1. Encounter for well adult exam without abnormal findings  2. Acquired hypothyroidism  -     levothyroxine (SYNTHROID) 75 MCG tablet; Take 1 tablet by mouth daily, Disp-90 tablet, R-3Normal  -     TSH with Reflex to FT4; Future  3. PREM (generalized anxiety disorder)  4.

## 2023-09-26 ASSESSMENT — PATIENT HEALTH QUESTIONNAIRE - PHQ9
SUM OF ALL RESPONSES TO PHQ QUESTIONS 1-9: 1
1. LITTLE INTEREST OR PLEASURE IN DOING THINGS: 1
SUM OF ALL RESPONSES TO PHQ QUESTIONS 1-9: 1
2. FEELING DOWN, DEPRESSED OR HOPELESS: 0
2. FEELING DOWN, DEPRESSED OR HOPELESS: NOT AT ALL
SUM OF ALL RESPONSES TO PHQ9 QUESTIONS 1 & 2: 1
SUM OF ALL RESPONSES TO PHQ QUESTIONS 1-9: 1
1. LITTLE INTEREST OR PLEASURE IN DOING THINGS: SEVERAL DAYS
SUM OF ALL RESPONSES TO PHQ QUESTIONS 1-9: 1
SUM OF ALL RESPONSES TO PHQ9 QUESTIONS 1 & 2: 1

## 2023-09-26 NOTE — PROGRESS NOTES
rate and regular rhythm. Heart sounds: Normal heart sounds. No murmur heard. Pulmonary:      Effort: Pulmonary effort is normal. No respiratory distress. Breath sounds: Normal breath sounds. Chest:      Comments: Breasts symmetrical without tenderness, masses, or nipple discharge. Nipples everted bilaterally. Abdominal:      General: There is no distension. Palpations: Abdomen is soft. Tenderness: There is no abdominal tenderness. There is no guarding. Genitourinary:     General: Normal vulva. Exam position: Lithotomy position. Labia:         Right: No rash or lesion. Left: No rash or lesion. Vagina: Normal. No erythema or lesions. Cervix: Normal.      Uterus: Normal. Not tender. Adnexa: Right adnexa normal and left adnexa normal.        Right: No mass, tenderness or fullness. Left: No mass, tenderness or fullness. Musculoskeletal:         General: Normal range of motion. Cervical back: Normal range of motion and neck supple. Skin:     General: Skin is warm and dry. Capillary Refill: Capillary refill takes less than 2 seconds. Neurological:      Mental Status: She is alert and oriented to person, place, and time. Psychiatric:         Behavior: Behavior normal.         Thought Content: Thought content normal.         Judgment: Judgment normal.          Diagnosis Orders   1. Well woman exam with routine gynecological exam  PAP SMEAR      2. Screening for cervical cancer  Human papillomavirus (HPV) DNA probe thin prep high risk          MEDICATIONS:  No orders of the defined types were placed in this encounter. ORDERS:  Orders Placed This Encounter   Procedures    PAP SMEAR    Human papillomavirus (HPV) DNA probe thin prep high risk       PLAN:  WWE- pap collected, SBE reviewed and CBE performed.  Mammogram order given and will sign record release for previous mammogram

## 2023-09-28 ENCOUNTER — OFFICE VISIT (OUTPATIENT)
Dept: OBGYN CLINIC | Age: 43
End: 2023-09-28
Payer: OTHER GOVERNMENT

## 2023-09-28 VITALS
BODY MASS INDEX: 28.04 KG/M2 | DIASTOLIC BLOOD PRESSURE: 93 MMHG | WEIGHT: 179 LBS | HEART RATE: 99 BPM | SYSTOLIC BLOOD PRESSURE: 143 MMHG

## 2023-09-28 DIAGNOSIS — Z12.4 SCREENING FOR CERVICAL CANCER: ICD-10-CM

## 2023-09-28 DIAGNOSIS — Z12.31 ENCOUNTER FOR SCREENING MAMMOGRAM FOR MALIGNANT NEOPLASM OF BREAST: ICD-10-CM

## 2023-09-28 DIAGNOSIS — Z01.419 WELL WOMAN EXAM WITH ROUTINE GYNECOLOGICAL EXAM: Primary | ICD-10-CM

## 2023-09-28 LAB — HPV16+18+H RISK 12 DNA SPEC-IMP: NORMAL

## 2023-09-28 PROCEDURE — 99396 PREV VISIT EST AGE 40-64: CPT | Performed by: NURSE PRACTITIONER

## 2023-09-28 ASSESSMENT — ENCOUNTER SYMPTOMS
GASTROINTESTINAL NEGATIVE: 1
ALLERGIC/IMMUNOLOGIC NEGATIVE: 1
EYES NEGATIVE: 1
RESPIRATORY NEGATIVE: 1

## 2023-10-03 LAB
HPV HR 12 DNA SPEC QL NAA+PROBE: NOT DETECTED
HPV16 DNA SPEC QL NAA+PROBE: NOT DETECTED
HPV16+18+H RISK 12 DNA SPEC-IMP: NORMAL
HPV18 DNA SPEC QL NAA+PROBE: NOT DETECTED

## 2023-12-20 ENCOUNTER — OFFICE VISIT (OUTPATIENT)
Dept: OBGYN CLINIC | Age: 43
End: 2023-12-20
Payer: OTHER GOVERNMENT

## 2023-12-20 VITALS
SYSTOLIC BLOOD PRESSURE: 178 MMHG | HEIGHT: 67 IN | BODY MASS INDEX: 27.78 KG/M2 | DIASTOLIC BLOOD PRESSURE: 116 MMHG | WEIGHT: 177 LBS

## 2023-12-20 DIAGNOSIS — N81.10 CYSTOCELE WITHOUT UTERINE PROLAPSE: ICD-10-CM

## 2023-12-20 DIAGNOSIS — F41.1 GAD (GENERALIZED ANXIETY DISORDER): ICD-10-CM

## 2023-12-20 DIAGNOSIS — N76.0 ACUTE VAGINITIS: Primary | ICD-10-CM

## 2023-12-20 PROCEDURE — 99213 OFFICE O/P EST LOW 20 MIN: CPT | Performed by: NURSE PRACTITIONER

## 2023-12-20 NOTE — PROGRESS NOTES
[facility] OB/GYN  CNM Office Note    Darcie Ruiz is a 43 y.o. female who presents today for her medical conditions/ complaints as noted below.  Chief Complaint   Patient presents with    Other     Possible infection, irritation. She thinks she may have a prolapse, she notice over the weekend.         HPI  Possible infection, has some irritation or rubbing sensation for last few days. She thinks she may have a prolapse, she notice over the weekend feeling something close to the vaginal opening     Patient Active Problem List   Diagnosis    Heart palpitations    Allergic rhinitis    PREM (generalized anxiety disorder)    Dysmenorrhea    Mild tobacco abuse    Overweight (BMI 25.0-29.9)    Acquired hypothyroidism    Vitamin D deficiency    Facet arthritis, degenerative, L5-S1 level, lumbosacral spine       Patient's last menstrual period was 2023.      Past Medical History:   Diagnosis Date    Adjustment disorder with mixed anxiety and depressed mood     Allergic rhinitis, cause unspecified     Anxiety state, unspecified     COVID-19 virus infection 2021    Symptoms started 21    Facet arthritis, degenerative, L5-S1 level, lumbosacral spine 2021    Palpitations     Paroxysmal SVT (supraventricular tachycardia) 2014    Radial styloid tenosynovitis     wrist pain while breast feeding.     Past Surgical History:   Procedure Laterality Date    WISDOM TOOTH EXTRACTION       Family History   Problem Relation Age of Onset    High Blood Pressure Mother     Cancer Mother         endometrial cancer    High Blood Pressure Father     Diabetes Father     Stroke Maternal Grandfather     Heart Disease Maternal Grandfather     Colon Polyps Maternal Grandfather         benign adenomatous polyp of large intestine    Diabetes Maternal Grandfather      Social History     Tobacco Use    Smoking status: Light Smoker     Current packs/day: 0.25     Average packs/day: 0.3 packs/day for 20.0

## 2024-01-01 ASSESSMENT — ENCOUNTER SYMPTOMS
RESPIRATORY NEGATIVE: 1
EYES NEGATIVE: 1
ALLERGIC/IMMUNOLOGIC NEGATIVE: 1
GASTROINTESTINAL NEGATIVE: 1

## 2024-01-03 DIAGNOSIS — F41.1 GAD (GENERALIZED ANXIETY DISORDER): Primary | ICD-10-CM

## 2024-01-03 RX ORDER — DESVENLAFAXINE SUCCINATE 50 MG/1
50 TABLET, EXTENDED RELEASE ORAL DAILY
Qty: 30 TABLET | Refills: 3 | Status: SHIPPED | OUTPATIENT
Start: 2024-01-03

## 2024-01-09 ASSESSMENT — PATIENT HEALTH QUESTIONNAIRE - PHQ9
2. FEELING DOWN, DEPRESSED OR HOPELESS: 1
1. LITTLE INTEREST OR PLEASURE IN DOING THINGS: SEVERAL DAYS
SUM OF ALL RESPONSES TO PHQ9 QUESTIONS 1 & 2: 2
2. FEELING DOWN, DEPRESSED OR HOPELESS: SEVERAL DAYS
SUM OF ALL RESPONSES TO PHQ QUESTIONS 1-9: 2
1. LITTLE INTEREST OR PLEASURE IN DOING THINGS: 1
SUM OF ALL RESPONSES TO PHQ9 QUESTIONS 1 & 2: 2
SUM OF ALL RESPONSES TO PHQ QUESTIONS 1-9: 2

## 2024-01-10 ENCOUNTER — OFFICE VISIT (OUTPATIENT)
Dept: PRIMARY CARE CLINIC | Age: 44
End: 2024-01-10
Payer: OTHER GOVERNMENT

## 2024-01-10 VITALS
OXYGEN SATURATION: 98 % | HEART RATE: 88 BPM | HEIGHT: 66 IN | BODY MASS INDEX: 28.12 KG/M2 | TEMPERATURE: 97.4 F | DIASTOLIC BLOOD PRESSURE: 86 MMHG | SYSTOLIC BLOOD PRESSURE: 142 MMHG | WEIGHT: 175 LBS

## 2024-01-10 DIAGNOSIS — E55.9 VITAMIN D DEFICIENCY: ICD-10-CM

## 2024-01-10 DIAGNOSIS — Z91.89 AT RISK FOR OBSTRUCTIVE SLEEP APNEA: ICD-10-CM

## 2024-01-10 DIAGNOSIS — E03.9 ACQUIRED HYPOTHYROIDISM: ICD-10-CM

## 2024-01-10 DIAGNOSIS — I10 ESSENTIAL HYPERTENSION: Primary | ICD-10-CM

## 2024-01-10 DIAGNOSIS — F17.210 CIGARETTE SMOKER: ICD-10-CM

## 2024-01-10 DIAGNOSIS — E66.3 OVERWEIGHT (BMI 25.0-29.9): ICD-10-CM

## 2024-01-10 DIAGNOSIS — R06.83 SNORING: ICD-10-CM

## 2024-01-10 DIAGNOSIS — E78.00 PURE HYPERCHOLESTEROLEMIA: ICD-10-CM

## 2024-01-10 DIAGNOSIS — R53.82 CHRONIC FATIGUE: ICD-10-CM

## 2024-01-10 PROCEDURE — 3079F DIAST BP 80-89 MM HG: CPT | Performed by: INTERNAL MEDICINE

## 2024-01-10 PROCEDURE — 3077F SYST BP >= 140 MM HG: CPT | Performed by: INTERNAL MEDICINE

## 2024-01-10 PROCEDURE — 99214 OFFICE O/P EST MOD 30 MIN: CPT | Performed by: INTERNAL MEDICINE

## 2024-01-10 RX ORDER — AMLODIPINE BESYLATE 10 MG/1
10 TABLET ORAL DAILY
Qty: 30 TABLET | Refills: 2 | Status: SHIPPED | OUTPATIENT
Start: 2024-01-10

## 2024-01-10 NOTE — PROGRESS NOTES
08/23/2023    LYMPHOPCT 39.1 08/23/2023    RBC 4.30 08/23/2023    MCH 32.6 (H) 08/23/2023    MCHC 33.1 08/23/2023    RDW 13.0 08/23/2023     Lab Results   Component Value Date/Time     08/23/2023 08:21 AM    K 4.0 08/23/2023 08:21 AM     08/23/2023 08:21 AM    CO2 25 08/23/2023 08:21 AM    BUN 9 08/23/2023 08:21 AM    CREATININE 0.7 08/23/2023 08:21 AM    GLUCOSE 106 08/23/2023 08:21 AM    CALCIUM 9.0 08/23/2023 08:21 AM    LABALBU 4.7 08/23/2023 08:21 AM    ALKPHOS 74 08/23/2023 08:21 AM    ALT 20 08/23/2023 08:21 AM    AST 18 08/23/2023 08:21 AM     Lab Results   Component Value Date    TSH 2.690 08/23/2023    T4FREE 1.28 08/23/2023     Lab Results   Component Value Date    CHOL 218 (H) 08/23/2023    CHOL 208 (H) 03/15/2022    CHOL 222 (H) 01/19/2021     Lab Results   Component Value Date    TRIG 134 08/23/2023    TRIG 115 03/15/2022    TRIG 113 01/19/2021     Lab Results   Component Value Date    HDL 66 08/23/2023    HDL 57 (L) 03/15/2022    HDL 70 01/19/2021     Lab Results   Component Value Date    LDLCALC 125 08/23/2023    LDLCALC 128 03/15/2022    LDLCALC 129 01/19/2021     Lab Results   Component Value Date    VITD25 40.3 10/20/2022      Assessment:    ICD-10-CM    1. Essential hypertension  I10 amLODIPine (NORVASC) 10 MG tablet     Comprehensive Metabolic Panel      2. Snoring  R06.83 Home Sleep Study      3. At risk for obstructive sleep apnea  Z91.89 Home Sleep Study      4. Chronic fatigue  R53.82 Home Sleep Study     CBC with Auto Differential      5. Acquired hypothyroidism  E03.9 TSH with Reflex to FT4      6. Cigarette smoker  F17.210       7. Vitamin D deficiency  E55.9 Vitamin D 25 Hydroxy      8. Pure hypercholesterolemia  E78.00 Comprehensive Metabolic Panel     Lipid Panel      9. Overweight (BMI 25.0-29.9)  E66.3           Plan:  Darcie TRIPATHI was seen today for other and anxiety.    Diagnoses and all orders for this visit:    Essential hypertension  -     amLODIPine (NORVASC) 10 MG

## 2024-01-17 DIAGNOSIS — E03.9 ACQUIRED HYPOTHYROIDISM: ICD-10-CM

## 2024-01-17 DIAGNOSIS — R73.9 HYPERGLYCEMIA: Primary | ICD-10-CM

## 2024-01-17 DIAGNOSIS — R73.9 HYPERGLYCEMIA: ICD-10-CM

## 2024-01-17 DIAGNOSIS — R53.82 CHRONIC FATIGUE: ICD-10-CM

## 2024-01-17 DIAGNOSIS — E55.9 VITAMIN D DEFICIENCY: ICD-10-CM

## 2024-01-17 DIAGNOSIS — E78.00 PURE HYPERCHOLESTEROLEMIA: ICD-10-CM

## 2024-01-17 DIAGNOSIS — I10 ESSENTIAL HYPERTENSION: ICD-10-CM

## 2024-01-17 LAB
25(OH)D3 SERPL-MCNC: 26.6 NG/ML
ALBUMIN SERPL-MCNC: 4.6 G/DL (ref 3.5–5.2)
ALP SERPL-CCNC: 75 U/L (ref 35–104)
ALT SERPL-CCNC: 17 U/L (ref 5–33)
ANION GAP SERPL CALCULATED.3IONS-SCNC: 9 MMOL/L (ref 7–19)
AST SERPL-CCNC: 14 U/L (ref 5–32)
BASOPHILS # BLD: 0.1 K/UL (ref 0–0.2)
BASOPHILS NFR BLD: 1 % (ref 0–1)
BILIRUB SERPL-MCNC: 0.3 MG/DL (ref 0.2–1.2)
BUN SERPL-MCNC: 10 MG/DL (ref 6–20)
CALCIUM SERPL-MCNC: 9.1 MG/DL (ref 8.6–10)
CHLORIDE SERPL-SCNC: 102 MMOL/L (ref 98–111)
CHOLEST SERPL-MCNC: 186 MG/DL (ref 160–199)
CO2 SERPL-SCNC: 26 MMOL/L (ref 22–29)
CREAT SERPL-MCNC: 0.6 MG/DL (ref 0.5–0.9)
EOSINOPHIL # BLD: 0.1 K/UL (ref 0–0.6)
EOSINOPHIL NFR BLD: 1.2 % (ref 0–5)
ERYTHROCYTE [DISTWIDTH] IN BLOOD BY AUTOMATED COUNT: 12.3 % (ref 11.5–14.5)
GLUCOSE SERPL-MCNC: 114 MG/DL (ref 74–109)
HBA1C MFR BLD: 5.2 % (ref 4–6)
HCT VFR BLD AUTO: 41.6 % (ref 37–47)
HDLC SERPL-MCNC: 67 MG/DL (ref 65–121)
HGB BLD-MCNC: 13.9 G/DL (ref 12–16)
IMM GRANULOCYTES # BLD: 0 K/UL
LDLC SERPL CALC-MCNC: 98 MG/DL
LYMPHOCYTES # BLD: 2 K/UL (ref 1.1–4.5)
LYMPHOCYTES NFR BLD: 29.2 % (ref 20–40)
MCH RBC QN AUTO: 32.1 PG (ref 27–31)
MCHC RBC AUTO-ENTMCNC: 33.4 G/DL (ref 33–37)
MCV RBC AUTO: 96.1 FL (ref 81–99)
MONOCYTES # BLD: 0.6 K/UL (ref 0–0.9)
MONOCYTES NFR BLD: 8.8 % (ref 0–10)
NEUTROPHILS # BLD: 4.1 K/UL (ref 1.5–7.5)
NEUTS SEG NFR BLD: 59.5 % (ref 50–65)
PLATELET # BLD AUTO: 396 K/UL (ref 130–400)
PMV BLD AUTO: 9 FL (ref 9.4–12.3)
POTASSIUM SERPL-SCNC: 3.9 MMOL/L (ref 3.5–5)
PROT SERPL-MCNC: 7 G/DL (ref 6.6–8.7)
RBC # BLD AUTO: 4.33 M/UL (ref 4.2–5.4)
SODIUM SERPL-SCNC: 137 MMOL/L (ref 136–145)
TRIGL SERPL-MCNC: 106 MG/DL (ref 0–149)
TSH SERPL DL<=0.005 MIU/L-ACNC: 3.16 UIU/ML (ref 0.35–5.5)
WBC # BLD AUTO: 7 K/UL (ref 4.8–10.8)

## 2024-01-31 DIAGNOSIS — F41.0 PANIC DISORDER WITHOUT AGORAPHOBIA: ICD-10-CM

## 2024-01-31 DIAGNOSIS — F41.1 GAD (GENERALIZED ANXIETY DISORDER): ICD-10-CM

## 2024-01-31 RX ORDER — BUSPIRONE HYDROCHLORIDE 5 MG/1
TABLET ORAL
Qty: 60 TABLET | Refills: 3 | Status: SHIPPED | OUTPATIENT
Start: 2024-01-31

## 2024-01-31 NOTE — TELEPHONE ENCOUNTER
Darcie Ruiz called to request a refill on her medication.      Last office visit : 1/10/2024   Next office visit : 2/14/2024     Requested Prescriptions     Pending Prescriptions Disp Refills    busPIRone (BUSPAR) 5 MG tablet [Pharmacy Med Name: BUSPIRONE HYDROCHLORIDE 5MG TABLET] 60 tablet 3     Sig: TAKE ONE (1) TO TWO (2) TABLETS EVERY EIGHT HOURS AS NEEDED ANXIETY AND PANIC GENERIC FOR BUSPAR            Livier Salamanca MA

## 2024-02-02 DIAGNOSIS — I10 ESSENTIAL HYPERTENSION: Primary | ICD-10-CM

## 2024-02-02 RX ORDER — HYDROCHLOROTHIAZIDE 25 MG/1
12.5 TABLET ORAL EVERY MORNING
Qty: 30 TABLET | Refills: 1 | Status: SHIPPED | OUTPATIENT
Start: 2024-02-02

## 2024-02-08 ENCOUNTER — OFFICE VISIT (OUTPATIENT)
Dept: PRIMARY CARE CLINIC | Age: 44
End: 2024-02-08
Payer: OTHER GOVERNMENT

## 2024-02-08 VITALS
TEMPERATURE: 97.4 F | HEIGHT: 66 IN | BODY MASS INDEX: 27.16 KG/M2 | DIASTOLIC BLOOD PRESSURE: 78 MMHG | WEIGHT: 169 LBS | OXYGEN SATURATION: 97 % | HEART RATE: 78 BPM | SYSTOLIC BLOOD PRESSURE: 130 MMHG

## 2024-02-08 DIAGNOSIS — R42 DIZZINESS: ICD-10-CM

## 2024-02-08 DIAGNOSIS — I10 ESSENTIAL HYPERTENSION: ICD-10-CM

## 2024-02-08 DIAGNOSIS — F41.1 GAD (GENERALIZED ANXIETY DISORDER): Primary | ICD-10-CM

## 2024-02-08 DIAGNOSIS — Z79.899 MEDICATION MANAGEMENT: ICD-10-CM

## 2024-02-08 DIAGNOSIS — K13.0 ANGULAR CHEILITIS: ICD-10-CM

## 2024-02-08 DIAGNOSIS — E66.3 OVERWEIGHT (BMI 25.0-29.9): ICD-10-CM

## 2024-02-08 LAB
ANION GAP SERPL CALCULATED.3IONS-SCNC: 11 MMOL/L (ref 7–19)
BUN SERPL-MCNC: 6 MG/DL (ref 6–20)
CALCIUM SERPL-MCNC: 9.5 MG/DL (ref 8.6–10)
CHLORIDE SERPL-SCNC: 100 MMOL/L (ref 98–111)
CO2 SERPL-SCNC: 28 MMOL/L (ref 22–29)
CREAT SERPL-MCNC: 0.6 MG/DL (ref 0.5–0.9)
GLUCOSE SERPL-MCNC: 104 MG/DL (ref 74–109)
POTASSIUM SERPL-SCNC: 3.2 MMOL/L (ref 3.5–5)
SODIUM SERPL-SCNC: 139 MMOL/L (ref 136–145)

## 2024-02-08 PROCEDURE — 99214 OFFICE O/P EST MOD 30 MIN: CPT | Performed by: INTERNAL MEDICINE

## 2024-02-08 PROCEDURE — 3075F SYST BP GE 130 - 139MM HG: CPT | Performed by: INTERNAL MEDICINE

## 2024-02-08 PROCEDURE — 3078F DIAST BP <80 MM HG: CPT | Performed by: INTERNAL MEDICINE

## 2024-02-08 RX ORDER — VILAZODONE HYDROCHLORIDE 10 MG/1
10 TABLET ORAL NIGHTLY
Qty: 30 TABLET | Refills: 1 | Status: SHIPPED | OUTPATIENT
Start: 2024-02-08

## 2024-02-08 RX ORDER — CLONAZEPAM 0.5 MG/1
0.5 TABLET ORAL EVERY 12 HOURS PRN
Qty: 30 TABLET | Refills: 0 | Status: SHIPPED | OUTPATIENT
Start: 2024-02-08 | End: 2024-03-09

## 2024-02-08 RX ORDER — DESVENLAFAXINE 25 MG/1
TABLET, EXTENDED RELEASE ORAL
Qty: 14 TABLET | Refills: 0
Start: 2024-02-08

## 2024-02-08 RX ORDER — KETOCONAZOLE 20 MG/G
CREAM TOPICAL
Qty: 60 G | Refills: 1 | Status: SHIPPED | OUTPATIENT
Start: 2024-02-08

## 2024-02-08 ASSESSMENT — ENCOUNTER SYMPTOMS
CHEST TIGHTNESS: 0
BLOOD IN STOOL: 0
DIARRHEA: 0
WHEEZING: 0
ABDOMINAL PAIN: 0
EYE PAIN: 0
RHINORRHEA: 0
VOMITING: 0
VOICE CHANGE: 0
SINUS PRESSURE: 0
COUGH: 0
SHORTNESS OF BREATH: 0
COLOR CHANGE: 0
EYE DISCHARGE: 0
SORE THROAT: 0
EYE REDNESS: 0

## 2024-02-08 ASSESSMENT — ANXIETY QUESTIONNAIRES
5. BEING SO RESTLESS THAT IT IS HARD TO SIT STILL: 3
1. FEELING NERVOUS, ANXIOUS, OR ON EDGE: 3
7. FEELING AFRAID AS IF SOMETHING AWFUL MIGHT HAPPEN: 3
3. WORRYING TOO MUCH ABOUT DIFFERENT THINGS: 3
GAD7 TOTAL SCORE: 21
6. BECOMING EASILY ANNOYED OR IRRITABLE: 3
IF YOU CHECKED OFF ANY PROBLEMS ON THIS QUESTIONNAIRE, HOW DIFFICULT HAVE THESE PROBLEMS MADE IT FOR YOU TO DO YOUR WORK, TAKE CARE OF THINGS AT HOME, OR GET ALONG WITH OTHER PEOPLE: VERY DIFFICULT
2. NOT BEING ABLE TO STOP OR CONTROL WORRYING: 3
4. TROUBLE RELAXING: 3

## 2024-02-08 NOTE — PROGRESS NOTES
Darcie Ruiz is a 43 y.o. female who presents today for   Chief Complaint   Patient presents with    Anxiety    Hypertension       HPI  44 y/o WF here for poorly controlled anxiety and new onset HTN after starting and adjusting blood pressure medications. Hydrochlorothiazide was added 6 days ago due to blood pressure not controlled with amlodipine 10 mg. She had to take 1/2 of a clonidine 0.1 mg tablet this morning for blood pressure of 138/89 because she worried it would be even higher if she did not take it. She does not feel like her anxiety is controlled at all and she has been taking buspirinone 5 mg when she gets up and then she takes 2.5 mg of buspirinone about an hour later and then 1/2 of a clonidine also along with desvenlafaxine 50 mg at bedtime. She feels like the buspirinone makes her dizzy and causes headache(s) however. She is suppose to  home sleep study machine on 2/13/24.    BMI Readings from Last 3 Encounters:   02/08/24 27.28 kg/m²   01/10/24 28.25 kg/m²   12/20/23 27.72 kg/m²     Wt Readings from Last 3 Encounters:   02/08/24 76.7 kg (169 lb)   01/10/24 79.4 kg (175 lb)   12/20/23 80.3 kg (177 lb)     Patient also complains of cracking of skin that is painful in the corners of her mouth.  She has been applying Chapstick and some other topical things for dry lips but it has not helped so far.    Review of Systems   Constitutional:  Positive for fatigue. Negative for appetite change, chills, fever and unexpected weight change.   HENT:  Negative for ear pain, rhinorrhea, sinus pressure, sore throat and voice change.    Eyes:  Negative for pain, discharge and redness.   Respiratory:  Negative for cough, chest tightness, shortness of breath and wheezing.    Cardiovascular:  Negative for chest pain and palpitations.   Gastrointestinal:  Negative for abdominal pain, blood in stool, diarrhea and vomiting.   Endocrine: Negative for cold intolerance, heat intolerance and polydipsia.

## 2024-02-13 ENCOUNTER — HOSPITAL ENCOUNTER (OUTPATIENT)
Dept: SLEEP CENTER | Age: 44
Discharge: HOME | End: 2024-02-15
Payer: OTHER GOVERNMENT

## 2024-02-13 DIAGNOSIS — R53.82 CHRONIC FATIGUE: ICD-10-CM

## 2024-02-13 DIAGNOSIS — R06.83 SNORING: ICD-10-CM

## 2024-02-13 DIAGNOSIS — Z91.89 AT RISK FOR OBSTRUCTIVE SLEEP APNEA: ICD-10-CM

## 2024-02-13 PROCEDURE — G0399 HOME SLEEP TEST/TYPE 3 PORTA: HCPCS

## 2024-02-13 NOTE — PROGRESS NOTES
Ms.Amanda Ruiz presented today in the sleep center for a Home Sleep Test (HST).  Ms. Ruiz was instructed on the device and was requested to wear the unit for two nights.  was asked to have the HST monitor back by 10AM on  02/15/2024. The patient acknowledged he understood. The HST device was tested and was in working order.

## 2024-02-14 PROCEDURE — G0399 HOME SLEEP TEST/TYPE 3 PORTA: HCPCS

## 2024-02-23 DIAGNOSIS — G47.33 OSA (OBSTRUCTIVE SLEEP APNEA): Primary | ICD-10-CM

## 2024-02-29 ENCOUNTER — TELEPHONE (OUTPATIENT)
Dept: SLEEP CENTER | Age: 44
End: 2024-02-29

## 2024-02-29 NOTE — TELEPHONE ENCOUNTER
Left voicemail with Ms.Amanda Ruiz about his/her HST performed  02/13/2024 and 02/14/2024.  The HST on 02/13/2024 revealed an AHI of  9.1 and the study on 02/14/2024 revealed an AHI of 6.6.  An AHI between 5 and 14.9 is considered to be within the mild range.    The interpreting physician wrote orders for an APAP with a minimum pressure of 8cm/H2O and a maximum pressure of 20cm/H2O.  Orders, documentation and insurance information [facility].  Results were routed to the ordering provider, Oliva Ash M.D.

## 2024-03-01 DIAGNOSIS — G47.33 OSA (OBSTRUCTIVE SLEEP APNEA): Primary | ICD-10-CM

## 2024-03-04 ENCOUNTER — OFFICE VISIT (OUTPATIENT)
Dept: PRIMARY CARE CLINIC | Age: 44
End: 2024-03-04
Payer: OTHER GOVERNMENT

## 2024-03-04 VITALS
SYSTOLIC BLOOD PRESSURE: 124 MMHG | TEMPERATURE: 98.2 F | DIASTOLIC BLOOD PRESSURE: 78 MMHG | BODY MASS INDEX: 27.94 KG/M2 | WEIGHT: 173.13 LBS | OXYGEN SATURATION: 99 % | HEART RATE: 84 BPM

## 2024-03-04 DIAGNOSIS — I10 ESSENTIAL HYPERTENSION: Primary | ICD-10-CM

## 2024-03-04 DIAGNOSIS — G47.33 MILD OBSTRUCTIVE SLEEP APNEA: ICD-10-CM

## 2024-03-04 DIAGNOSIS — E03.9 ACQUIRED HYPOTHYROIDISM: ICD-10-CM

## 2024-03-04 DIAGNOSIS — E87.6 HYPOKALEMIA: ICD-10-CM

## 2024-03-04 DIAGNOSIS — E66.3 OVERWEIGHT (BMI 25.0-29.9): ICD-10-CM

## 2024-03-04 DIAGNOSIS — F41.1 GAD (GENERALIZED ANXIETY DISORDER): ICD-10-CM

## 2024-03-04 DIAGNOSIS — E55.9 VITAMIN D DEFICIENCY: ICD-10-CM

## 2024-03-04 LAB
ANION GAP SERPL CALCULATED.3IONS-SCNC: 12 MMOL/L (ref 7–19)
BUN SERPL-MCNC: 11 MG/DL (ref 6–20)
CALCIUM SERPL-MCNC: 9.2 MG/DL (ref 8.6–10)
CHLORIDE SERPL-SCNC: 100 MMOL/L (ref 98–111)
CO2 SERPL-SCNC: 27 MMOL/L (ref 22–29)
CREAT SERPL-MCNC: 0.7 MG/DL (ref 0.5–0.9)
GLUCOSE SERPL-MCNC: 108 MG/DL (ref 74–109)
POTASSIUM SERPL-SCNC: 3.3 MMOL/L (ref 3.5–5)
SODIUM SERPL-SCNC: 139 MMOL/L (ref 136–145)

## 2024-03-04 PROCEDURE — 99214 OFFICE O/P EST MOD 30 MIN: CPT | Performed by: INTERNAL MEDICINE

## 2024-03-04 PROCEDURE — 3078F DIAST BP <80 MM HG: CPT | Performed by: INTERNAL MEDICINE

## 2024-03-04 PROCEDURE — 3074F SYST BP LT 130 MM HG: CPT | Performed by: INTERNAL MEDICINE

## 2024-03-04 RX ORDER — CLONAZEPAM 0.5 MG/1
0.5 TABLET ORAL EVERY 12 HOURS PRN
Qty: 30 TABLET | Refills: 0 | Status: SHIPPED | OUTPATIENT
Start: 2024-03-04 | End: 2024-04-03

## 2024-03-04 RX ORDER — CHOLECALCIFEROL (VITAMIN D3) 50 MCG
2000 TABLET ORAL DAILY
Qty: 30 TABLET | Refills: 5 | COMMUNITY
Start: 2024-03-04

## 2024-03-04 RX ORDER — POTASSIUM CHLORIDE 20 MEQ/1
20 TABLET, EXTENDED RELEASE ORAL DAILY
Qty: 30 TABLET | Refills: 3 | Status: SHIPPED | OUTPATIENT
Start: 2024-03-04

## 2024-03-04 RX ORDER — VILAZODONE HYDROCHLORIDE 20 MG/1
20 TABLET ORAL DAILY
Qty: 30 TABLET | Refills: 2 | Status: SHIPPED | OUTPATIENT
Start: 2024-03-04

## 2024-03-04 ASSESSMENT — ENCOUNTER SYMPTOMS
SORE THROAT: 0
SHORTNESS OF BREATH: 0
COUGH: 0
CHEST TIGHTNESS: 0
DIARRHEA: 0
EYE DISCHARGE: 0
ABDOMINAL PAIN: 0
VOMITING: 0
EYE PAIN: 0
VOICE CHANGE: 0
SINUS PRESSURE: 0
WHEEZING: 0
COLOR CHANGE: 0
BLOOD IN STOOL: 0
EYE REDNESS: 0
RHINORRHEA: 0

## 2024-03-04 NOTE — PROGRESS NOTES
01/17/2024 08:46 AM    AST 14 01/17/2024 08:46 AM     Lab Results   Component Value Date    CHOL 186 01/17/2024    CHOL 218 (H) 08/23/2023    CHOL 208 (H) 03/15/2022     Lab Results   Component Value Date    TRIG 106 01/17/2024    TRIG 134 08/23/2023    TRIG 115 03/15/2022     Lab Results   Component Value Date    HDL 67 01/17/2024    HDL 66 08/23/2023    HDL 57 (L) 03/15/2022     Lab Results   Component Value Date    LDLCALC 98 01/17/2024    LDLCALC 125 08/23/2023    LDLCALC 128 03/15/2022     No results found for: \"VLDL\"  No results found for: \"CHOLHDLRATIO\"   Lab Results   Component Value Date    TSH 2.690 08/23/2023    TSHFT4 3.16 01/17/2024      Lab Results   Component Value Date    VITD25 26.6 (L) 01/17/2024        Assessment:    ICD-10-CM    1. Essential hypertension  I10 Comprehensive Metabolic Panel      2. Acquired hypothyroidism  E03.9 TSH with Reflex to FT4      3. Mild obstructive sleep apnea  G47.33       4. PREM (generalized anxiety disorder)  F41.1 vilazodone HCl (VIIBRYD) 20 MG TABS     clonazePAM (KLONOPIN) 0.5 MG tablet      5. Hypokalemia  E87.6 Basic Metabolic Panel     potassium chloride (KLOR-CON M) 20 MEQ extended release tablet     Comprehensive Metabolic Panel      6. Vitamin D deficiency  E55.9 Vitamin D 25 Hydroxy     vitamin D (CHOLECALCIFEROL) 50 MCG (2000 UT) TABS tablet      7. Overweight (BMI 25.0-29.9)  E66.3           Plan:  Darcie TRIPATHI was seen today for follow-up, anxiety and hypertension.    Diagnoses and all orders for this visit:    Essential hypertension  -     Comprehensive Metabolic Panel; Future    Acquired hypothyroidism  -     TSH with Reflex to FT4; Future    Mild obstructive sleep apnea    PREM (generalized anxiety disorder)  -     vilazodone HCl (VIIBRYD) 20 MG TABS; Take 1 tablet by mouth daily  -     clonazePAM (KLONOPIN) 0.5 MG tablet; Take 1 tablet by mouth every 12 hours as needed for Anxiety for up to 30 days. Max Daily Amount: 1 mg    Hypokalemia  -     Basic

## 2024-03-05 DIAGNOSIS — I10 ESSENTIAL HYPERTENSION: ICD-10-CM

## 2024-03-05 NOTE — TELEPHONE ENCOUNTER
Darcie Ruiz called to request a refill on her medication.      Last office visit : 3/4/2024   Next office visit : 6/12/2024     Requested Prescriptions     Pending Prescriptions Disp Refills    amLODIPine (NORVASC) 10 MG tablet [Pharmacy Med Name: AMLODIPINE BESYLATE 10MG TABLET] 30 tablet 2     Sig: TAKE 1 TABLET BY MOUTH DAILY            Dalila Gerard MA

## 2024-03-06 RX ORDER — AMLODIPINE BESYLATE 10 MG/1
10 TABLET ORAL DAILY
Qty: 30 TABLET | Refills: 2 | Status: SHIPPED | OUTPATIENT
Start: 2024-03-06

## 2024-04-04 DIAGNOSIS — F41.1 GAD (GENERALIZED ANXIETY DISORDER): ICD-10-CM

## 2024-04-04 NOTE — TELEPHONE ENCOUNTER
Darcie Ruiz called to request a refill on her medication.      Last office visit : 3/4/2024   Next office visit : 6/12/2024     Last UDS: No results found for: \"LABAMPH\", \"LABBARB\", \"LABBENZ\", \"BUPRENUR\", \"COCAIMETSCRU\", \"GABAPENTIN\", \"MDMA\", \"METAMPU\", \"OPIATESCREENURINE\", \"OXTCOSU\", \"PHENCYCLIDINESCREENURINE\", \"PROPOXYPHENE\", \"THCSCREENUR\", \"TRICYUR\"    Last Catrachito: 4/4/24  Medication Contract: 2/8/24   Last Fill: 3/4/24    Requested Prescriptions     Pending Prescriptions Disp Refills    clonazePAM (KLONOPIN) 0.5 MG tablet [Pharmacy Med Name: CLONAZEPAM 0.5MG TABLET] 30 tablet 0     Sig: Take 1 tablet by mouth every 12 hours as needed for Anxiety for up to 30 days. Max Daily Amount: 1 mg         Please approve or refuse this medication.   Dalila Gerard MA

## 2024-04-05 DIAGNOSIS — F41.1 GAD (GENERALIZED ANXIETY DISORDER): ICD-10-CM

## 2024-04-05 NOTE — TELEPHONE ENCOUNTER
Darcie Ruiz called to request a refill on her medication.      Last office visit : 3/4/2024   Next office visit : 6/12/2024     Last UDS: No results found for: \"LABAMPH\", \"LABBARB\", \"LABBENZ\", \"BUPRENUR\", \"COCAIMETSCRU\", \"GABAPENTIN\", \"MDMA\", \"METAMPU\", \"OPIATESCREENURINE\", \"OXTCOSU\", \"PHENCYCLIDINESCREENURINE\", \"PROPOXYPHENE\", \"THCSCREENUR\", \"TRICYUR\"    Last Catrachito: 4/4/2024  Medication Contract: 02/08/2024   Last Fill: 3/4/2024    Requested Prescriptions     Pending Prescriptions Disp Refills    clonazePAM (KLONOPIN) 0.5 MG tablet 30 tablet 0     Sig: Take 1 tablet by mouth every 12 hours as needed for Anxiety for up to 30 days. Max Daily Amount: 1 mg         Please approve or refuse this medication.   Livier Salamanca MA

## 2024-04-07 RX ORDER — CLONAZEPAM 0.5 MG/1
0.5 TABLET ORAL EVERY 12 HOURS PRN
Qty: 30 TABLET | Refills: 0 | OUTPATIENT
Start: 2024-04-07 | End: 2024-05-07

## 2024-04-07 RX ORDER — CLONAZEPAM 0.5 MG/1
0.5 TABLET ORAL EVERY 12 HOURS PRN
Qty: 30 TABLET | Refills: 0 | Status: SHIPPED | OUTPATIENT
Start: 2024-04-07 | End: 2024-05-07

## 2024-04-26 DIAGNOSIS — I10 ESSENTIAL HYPERTENSION: ICD-10-CM

## 2024-04-26 RX ORDER — HYDROCHLOROTHIAZIDE 25 MG/1
12.5 TABLET ORAL EVERY MORNING
Qty: 15 TABLET | Refills: 5 | Status: SHIPPED | OUTPATIENT
Start: 2024-04-26

## 2024-05-06 DIAGNOSIS — F41.1 GAD (GENERALIZED ANXIETY DISORDER): ICD-10-CM

## 2024-05-06 NOTE — TELEPHONE ENCOUNTER
Darcie Ruiz called to request a refill on her medication.      Last office visit : 3/4/2024   Next office visit : 6/12/2024     Requested Prescriptions     Pending Prescriptions Disp Refills    vilazodone HCl (VIIBRYD) 20 MG TABS [Pharmacy Med Name: VILAZODONE HYDROCHLORIDE 20MG TABLET] 30 tablet 2     Sig: TAKE 1 TABLET BY MOUTH DAILY            Celia Heard MA

## 2024-05-08 RX ORDER — VILAZODONE HYDROCHLORIDE 20 MG/1
20 TABLET ORAL DAILY
Qty: 30 TABLET | Refills: 2 | Status: SHIPPED | OUTPATIENT
Start: 2024-05-08

## 2024-05-10 DIAGNOSIS — I10 ESSENTIAL HYPERTENSION: ICD-10-CM

## 2024-05-10 DIAGNOSIS — F41.1 GAD (GENERALIZED ANXIETY DISORDER): ICD-10-CM

## 2024-05-10 RX ORDER — CLONAZEPAM 0.5 MG/1
0.5 TABLET ORAL EVERY 12 HOURS PRN
Qty: 30 TABLET | Refills: 0 | Status: SHIPPED | OUTPATIENT
Start: 2024-05-10 | End: 2024-06-09

## 2024-05-10 RX ORDER — HYDROCHLOROTHIAZIDE 25 MG/1
25 TABLET ORAL EVERY MORNING
Qty: 30 TABLET | Refills: 2 | Status: SHIPPED | OUTPATIENT
Start: 2024-05-10

## 2024-05-10 NOTE — TELEPHONE ENCOUNTER
Darcie Ruiz called to request a refill on her medication.      Last office visit : 3/4/2024   Next office visit : 6/12/2024     Last UDS: No results found for: \"LABAMPH\", \"LABBARB\", \"LABBENZ\", \"BUPRENUR\", \"COCAIMETSCRU\", \"GABAPENTIN\", \"MDMA\", \"OPIATESCREENURINE\", \"OXTCOSU\", \"PHENCYCLIDINESCREENURINE\", \"PROPOXYPHENE\", \"THCSCREENUR\", \"TRICYUR\"    Last Catrachito: 4/4/24  Medication Contract: 2/8/24   Last Fill: 4/7/24    Requested Prescriptions     Pending Prescriptions Disp Refills    clonazePAM (KLONOPIN) 0.5 MG tablet [Pharmacy Med Name: CLONAZEPAM 0.5MG TABLET] 30 tablet 0     Sig: Take 1 tablet by mouth every 12 hours as needed for Anxiety for up to 30 days. Max Daily Amount: 1 mg         Please approve or refuse this medication.   Dalila Gerard MA

## 2024-05-15 DIAGNOSIS — F41.1 GAD (GENERALIZED ANXIETY DISORDER): ICD-10-CM

## 2024-05-15 RX ORDER — FLUOXETINE 10 MG/1
10 TABLET, FILM COATED ORAL DAILY
Qty: 30 TABLET | Refills: 1 | Status: SHIPPED | OUTPATIENT
Start: 2024-05-15

## 2024-05-15 RX ORDER — VILAZODONE HYDROCHLORIDE 20 MG/1
10 TABLET ORAL DAILY
Qty: 7 TABLET | Refills: 0
Start: 2024-05-15 | End: 2024-05-22

## 2024-05-30 DIAGNOSIS — F41.1 GAD (GENERALIZED ANXIETY DISORDER): ICD-10-CM

## 2024-05-30 RX ORDER — FLUOXETINE 10 MG/1
20 TABLET, FILM COATED ORAL DAILY
Qty: 30 TABLET | Refills: 1
Start: 2024-05-30

## 2024-06-05 DIAGNOSIS — F41.1 GAD (GENERALIZED ANXIETY DISORDER): ICD-10-CM

## 2024-06-05 RX ORDER — CLONAZEPAM 0.5 MG/1
0.5 TABLET ORAL EVERY 12 HOURS PRN
Qty: 30 TABLET | Refills: 0 | OUTPATIENT
Start: 2024-06-05 | End: 2024-07-05

## 2024-06-10 DIAGNOSIS — F41.1 GAD (GENERALIZED ANXIETY DISORDER): ICD-10-CM

## 2024-06-10 RX ORDER — CLONAZEPAM 0.5 MG/1
0.5 TABLET ORAL EVERY 12 HOURS PRN
Qty: 30 TABLET | Refills: 0 | Status: SHIPPED | OUTPATIENT
Start: 2024-06-10 | End: 2024-07-10

## 2024-06-11 SDOH — ECONOMIC STABILITY: FOOD INSECURITY: WITHIN THE PAST 12 MONTHS, YOU WORRIED THAT YOUR FOOD WOULD RUN OUT BEFORE YOU GOT MONEY TO BUY MORE.: NEVER TRUE

## 2024-06-11 SDOH — ECONOMIC STABILITY: INCOME INSECURITY: HOW HARD IS IT FOR YOU TO PAY FOR THE VERY BASICS LIKE FOOD, HOUSING, MEDICAL CARE, AND HEATING?: NOT HARD AT ALL

## 2024-06-11 SDOH — ECONOMIC STABILITY: FOOD INSECURITY: WITHIN THE PAST 12 MONTHS, THE FOOD YOU BOUGHT JUST DIDN'T LAST AND YOU DIDN'T HAVE MONEY TO GET MORE.: NEVER TRUE

## 2024-06-12 ENCOUNTER — OFFICE VISIT (OUTPATIENT)
Dept: PRIMARY CARE CLINIC | Age: 44
End: 2024-06-12
Payer: OTHER GOVERNMENT

## 2024-06-12 VITALS
OXYGEN SATURATION: 98 % | SYSTOLIC BLOOD PRESSURE: 118 MMHG | TEMPERATURE: 98 F | BODY MASS INDEX: 27.92 KG/M2 | HEART RATE: 97 BPM | WEIGHT: 173 LBS | DIASTOLIC BLOOD PRESSURE: 80 MMHG

## 2024-06-12 DIAGNOSIS — E87.6 HYPOKALEMIA: ICD-10-CM

## 2024-06-12 DIAGNOSIS — E03.9 ACQUIRED HYPOTHYROIDISM: ICD-10-CM

## 2024-06-12 DIAGNOSIS — I10 ESSENTIAL HYPERTENSION: ICD-10-CM

## 2024-06-12 DIAGNOSIS — I10 ESSENTIAL HYPERTENSION: Primary | ICD-10-CM

## 2024-06-12 DIAGNOSIS — E55.9 VITAMIN D DEFICIENCY: ICD-10-CM

## 2024-06-12 DIAGNOSIS — E66.3 OVERWEIGHT (BMI 25.0-29.9): ICD-10-CM

## 2024-06-12 DIAGNOSIS — Z51.81 THERAPEUTIC DRUG MONITORING: ICD-10-CM

## 2024-06-12 DIAGNOSIS — F41.1 GAD (GENERALIZED ANXIETY DISORDER): ICD-10-CM

## 2024-06-12 DIAGNOSIS — F17.200 MILD TOBACCO ABUSE: ICD-10-CM

## 2024-06-12 PROBLEM — K13.0 ANGULAR CHEILITIS: Status: RESOLVED | Noted: 2024-02-08 | Resolved: 2024-06-12

## 2024-06-12 LAB
25(OH)D3 SERPL-MCNC: 33.6 NG/ML
ALBUMIN SERPL-MCNC: 4.6 G/DL (ref 3.5–5.2)
ALCOHOL URINE: NORMAL
ALP SERPL-CCNC: 75 U/L (ref 35–104)
ALT SERPL-CCNC: 18 U/L (ref 5–33)
AMPHETAMINE SCREEN, URINE: NORMAL
ANION GAP SERPL CALCULATED.3IONS-SCNC: 12 MMOL/L (ref 7–19)
AST SERPL-CCNC: 15 U/L (ref 5–32)
BARBITURATE SCREEN, URINE: NORMAL
BENZODIAZEPINE SCREEN, URINE: NORMAL
BILIRUB SERPL-MCNC: <0.2 MG/DL (ref 0.2–1.2)
BUN SERPL-MCNC: 8 MG/DL (ref 6–20)
BUPRENORPHINE URINE: NORMAL
CALCIUM SERPL-MCNC: 9.3 MG/DL (ref 8.6–10)
CHLORIDE SERPL-SCNC: 96 MMOL/L (ref 98–111)
CO2 SERPL-SCNC: 27 MMOL/L (ref 22–29)
COCAINE METABOLITE SCREEN URINE: NORMAL
CREAT SERPL-MCNC: 0.5 MG/DL (ref 0.5–0.9)
FENTANYL SCREEN, URINE: NORMAL
GABAPENTIN SCREEN, URINE: NORMAL
GLUCOSE SERPL-MCNC: 100 MG/DL (ref 74–109)
MDMA URINE: NORMAL
METHADONE SCREEN, URINE: NORMAL
METHAMPHETAMINE, URINE: NORMAL
OPIATE SCREEN URINE: NORMAL
OXYCODONE SCREEN URINE: NORMAL
PHENCYCLIDINE SCREEN URINE: NORMAL
POTASSIUM SERPL-SCNC: 3.5 MMOL/L (ref 3.5–5)
PROPOXYPHENE SCREEN, URINE: NORMAL
PROT SERPL-MCNC: 7.3 G/DL (ref 6.6–8.7)
REASON FOR REJECTION: NORMAL
REJECTED TEST: NORMAL
SODIUM SERPL-SCNC: 135 MMOL/L (ref 136–145)
SYNTHETIC CANNABINOIDS(K2) SCREEN, URINE: NORMAL
T4 FREE SERPL-MCNC: 1.43 NG/DL (ref 0.93–1.7)
THC SCREEN, URINE: NORMAL
TRAMADOL SCREEN URINE: NORMAL
TRICYCLIC ANTIDEPRESSANTS, UR: NORMAL
TSH SERPL DL<=0.005 MIU/L-ACNC: 4.77 UIU/ML (ref 0.27–4.2)

## 2024-06-12 PROCEDURE — 3074F SYST BP LT 130 MM HG: CPT | Performed by: INTERNAL MEDICINE

## 2024-06-12 PROCEDURE — 99214 OFFICE O/P EST MOD 30 MIN: CPT | Performed by: INTERNAL MEDICINE

## 2024-06-12 PROCEDURE — 3079F DIAST BP 80-89 MM HG: CPT | Performed by: INTERNAL MEDICINE

## 2024-06-12 PROCEDURE — 80305 DRUG TEST PRSMV DIR OPT OBS: CPT | Performed by: INTERNAL MEDICINE

## 2024-06-12 RX ORDER — AMLODIPINE BESYLATE 10 MG/1
10 TABLET ORAL DAILY
Qty: 30 TABLET | Refills: 5 | Status: SHIPPED | OUTPATIENT
Start: 2024-06-12

## 2024-06-12 RX ORDER — FLUOXETINE HYDROCHLORIDE 20 MG/1
20 CAPSULE ORAL DAILY
Qty: 30 CAPSULE | Refills: 3 | Status: SHIPPED | OUTPATIENT
Start: 2024-06-12

## 2024-06-12 RX ORDER — LISINOPRIL 20 MG/1
20 TABLET ORAL DAILY
Qty: 30 TABLET | Refills: 5 | Status: SHIPPED | OUTPATIENT
Start: 2024-06-12

## 2024-06-12 NOTE — PROGRESS NOTES
10 MG tablet; Take 1 tablet by mouth daily  -     Cancel: Metanephrines Plasma Free; Future    PREM (generalized anxiety disorder)  -     FLUoxetine (PROZAC) 20 MG capsule; Take 1 capsule by mouth daily    Acquired hypothyroidism    Vitamin D deficiency    Mild tobacco abuse    Therapeutic drug monitoring  -     POCT Rapid Drug Screen    Overweight (BMI 25.0-29.9)        Labs reviewed with patient.  Refills Provided.  Essential Hypertension not well controlled. Medication adjusted today follows stop hydrochlorothiazide due to side effects, start lisinopril 20 mg daily and continue amlodipine 10 mg daily. Continue ambulatory blood pressure monitoring. Low salt diet, exercise, and maintaining a normal BMI for age encouraged.       Generalized Anxiety Disorder improving but not well controlled. Medication adjusted as follows- Continue fluoxetine 20 mg daily given she only just increased dose recently and continue clonazepam 0.5 mg q12-24 hours prn anxiety. Patient declines referral to Psychiatry/Behavioral Health at this time. The current medical regimen is effective. Continue present plan and medications.UDS and controlled substance contract updated today. No unusual filling on current IVETTE report. Tx continues to be medically necessary and improves patient quality of life. No signs of misuse of controlled medication.      RUTHANN well controlled with current CPAP settings. Stressed importance of efforts at weight loss for treatment also.    Overweight due to excess caloric intake-not improved. Continue/Increase efforts at low carbohydrate diet, exercise, and weight loss/efforts at normalizing BMI.     Social History-counseled on need to cut back on alcohol intake. Patient agrees to consider.     Return in about 2 months (around 8/12/2024) for follow up on anxiety/HTN/sleep apnea.    Over 50% of the total visit time of 30 minutes was spent on counseling and/or coordination of care of:   1. Essential hypertension    2.

## 2024-06-27 ENCOUNTER — TELEPHONE (OUTPATIENT)
Dept: PSYCHIATRY | Age: 44
End: 2024-06-27

## 2024-06-27 DIAGNOSIS — F41.1 GAD (GENERALIZED ANXIETY DISORDER): Primary | ICD-10-CM

## 2024-06-27 DIAGNOSIS — F32.1 MODERATE MAJOR DEPRESSION (HCC): ICD-10-CM

## 2024-06-27 NOTE — TELEPHONE ENCOUNTER
Called and scheduled an appt with pt from a referral for Specialty Services Required     Appt scheduled with Jennifer LEIGH for 7/11 @ 9 AM      Electronically signed by Dawood Perrin on 6/27/2024 at 4:43 PM

## 2024-07-03 DIAGNOSIS — F41.1 GAD (GENERALIZED ANXIETY DISORDER): ICD-10-CM

## 2024-07-03 RX ORDER — CLONAZEPAM 0.5 MG/1
0.5 TABLET ORAL EVERY 12 HOURS PRN
Qty: 30 TABLET | Refills: 0 | OUTPATIENT
Start: 2024-07-03 | End: 2024-08-02

## 2024-07-05 RX ORDER — CLONAZEPAM 0.5 MG/1
0.5 TABLET ORAL EVERY 12 HOURS PRN
Qty: 30 TABLET | Refills: 0 | OUTPATIENT
Start: 2024-07-05 | End: 2024-08-04

## 2024-07-08 DIAGNOSIS — F41.1 GAD (GENERALIZED ANXIETY DISORDER): ICD-10-CM

## 2024-07-08 RX ORDER — CLONAZEPAM 0.5 MG/1
0.5 TABLET ORAL DAILY PRN
Qty: 30 TABLET | Refills: 0 | Status: SHIPPED | OUTPATIENT
Start: 2024-07-08 | End: 2024-08-07

## 2024-07-08 RX ORDER — CLONAZEPAM 0.5 MG/1
0.5 TABLET ORAL EVERY 12 HOURS PRN
Qty: 30 TABLET | Refills: 0 | OUTPATIENT
Start: 2024-07-08 | End: 2024-08-07

## 2024-07-10 ENCOUNTER — TELEPHONE (OUTPATIENT)
Dept: PRIMARY CARE CLINIC | Age: 44
End: 2024-07-10

## 2024-07-10 DIAGNOSIS — F41.1 GAD (GENERALIZED ANXIETY DISORDER): ICD-10-CM

## 2024-07-10 RX ORDER — FLUOXETINE HYDROCHLORIDE 20 MG/1
40 CAPSULE ORAL DAILY
Qty: 60 CAPSULE | Refills: 0 | Status: SHIPPED | OUTPATIENT
Start: 2024-07-10

## 2024-07-10 NOTE — TELEPHONE ENCOUNTER
----- Message from Elkin Perez sent at 7/8/2024 11:28 AM CDT -----  Regarding: ECC Message to Provider  ECC Message to Provider    Relationship to Patient: Self     Additional Information Patient called and need a medication refill also she has a question about her medication.  --------------------------------------------------------------------------------------------------------------------------    Call Back Information: OK to leave message on voicemail  Preferred Call Back Number: Phone [phone]

## 2024-07-10 NOTE — TELEPHONE ENCOUNTER
Spoke with pt and she said this has been taken care of. I asked her which refills did she need. She said the Prozac and a blood pressure med she didn't know the name of. I asked her if it was the lisinopril, amlodipine or clonidine. She said she didn't know which one it was. I informed her they all had refills and she could contact the pharmacy for them. He verbalized understanding.

## 2024-07-11 ENCOUNTER — OFFICE VISIT (OUTPATIENT)
Dept: PSYCHIATRY | Age: 44
End: 2024-07-11
Payer: OTHER GOVERNMENT

## 2024-07-11 VITALS
OXYGEN SATURATION: 99 % | SYSTOLIC BLOOD PRESSURE: 140 MMHG | BODY MASS INDEX: 25.85 KG/M2 | TEMPERATURE: 97.8 F | WEIGHT: 164.7 LBS | HEART RATE: 69 BPM | DIASTOLIC BLOOD PRESSURE: 88 MMHG | HEIGHT: 67 IN

## 2024-07-11 DIAGNOSIS — F32.A DEPRESSION, UNSPECIFIED DEPRESSION TYPE: Primary | ICD-10-CM

## 2024-07-11 DIAGNOSIS — Z78.9 ALCOHOL USE: ICD-10-CM

## 2024-07-11 DIAGNOSIS — F41.9 ANXIETY DISORDER, UNSPECIFIED TYPE: ICD-10-CM

## 2024-07-11 DIAGNOSIS — Z56.6 STRESS AT WORK: ICD-10-CM

## 2024-07-11 PROCEDURE — 90792 PSYCH DIAG EVAL W/MED SRVCS: CPT

## 2024-07-11 RX ORDER — HYDROXYZINE HYDROCHLORIDE 10 MG/1
10-20 TABLET, FILM COATED ORAL 3 TIMES DAILY PRN
Qty: 90 TABLET | Refills: 0 | Status: SHIPPED | OUTPATIENT
Start: 2024-07-11

## 2024-07-11 SDOH — HEALTH STABILITY - MENTAL HEALTH: OTHER PHYSICAL AND MENTAL STRAIN RELATED TO WORK: Z56.6

## 2024-07-11 ASSESSMENT — PATIENT HEALTH QUESTIONNAIRE - PHQ9
5. POOR APPETITE OR OVEREATING: NEARLY EVERY DAY
9. THOUGHTS THAT YOU WOULD BE BETTER OFF DEAD, OR OF HURTING YOURSELF: NOT AT ALL
SUM OF ALL RESPONSES TO PHQ9 QUESTIONS 1 & 2: 6
SUM OF ALL RESPONSES TO PHQ QUESTIONS 1-9: 18
3. TROUBLE FALLING OR STAYING ASLEEP: SEVERAL DAYS
10. IF YOU CHECKED OFF ANY PROBLEMS, HOW DIFFICULT HAVE THESE PROBLEMS MADE IT FOR YOU TO DO YOUR WORK, TAKE CARE OF THINGS AT HOME, OR GET ALONG WITH OTHER PEOPLE: EXTREMELY DIFFICULT
SUM OF ALL RESPONSES TO PHQ QUESTIONS 1-9: 18
8. MOVING OR SPEAKING SO SLOWLY THAT OTHER PEOPLE COULD HAVE NOTICED. OR THE OPPOSITE, BEING SO FIGETY OR RESTLESS THAT YOU HAVE BEEN MOVING AROUND A LOT MORE THAN USUAL: NOT AT ALL
4. FEELING TIRED OR HAVING LITTLE ENERGY: MORE THAN HALF THE DAYS
6. FEELING BAD ABOUT YOURSELF - OR THAT YOU ARE A FAILURE OR HAVE LET YOURSELF OR YOUR FAMILY DOWN: NEARLY EVERY DAY
1. LITTLE INTEREST OR PLEASURE IN DOING THINGS: NEARLY EVERY DAY
2. FEELING DOWN, DEPRESSED OR HOPELESS: NEARLY EVERY DAY
7. TROUBLE CONCENTRATING ON THINGS, SUCH AS READING THE NEWSPAPER OR WATCHING TELEVISION: NEARLY EVERY DAY
SUM OF ALL RESPONSES TO PHQ QUESTIONS 1-9: 18
SUM OF ALL RESPONSES TO PHQ QUESTIONS 1-9: 18

## 2024-07-11 NOTE — PROGRESS NOTES
of psychosis, suicidality or homicidality.  Patient is psychiatrically stable.    PLAN    1. Continue   Prozac 40 mg daily for depression, anxiety     Start   Hydroxyzine 10-20 mg TID PRN for anxiety   Discussed benefits, alternatives, and risks including but not limited to wheezing, dyspnea, seizures, dry mouth, dizziness, increased fall risk, agitation, nausea. Advised caution in operating vehicles/machinery after taking, especially if sedation occurs.    Referral to [facility] for CBT    Discontinue   Alcohol use    The risks, benefits, side effects, indications, contraindications, and adverse effects of the medications have been discussed. Yes.  2. The pt has verbalized understanding and has capacity to give informed consent.  3. The Catrachito report has been reviewed according to HB1 regulations.  4. Supportive therapy offered.  5. Follow up: Return in about 2 weeks (around 7/25/2024) for Medication Follow up.  6. The patient has been advised to call with any problems. Advised to call 911 or go to Emergency Room if feeling suicidal  7. Controlled substance Treatment Plan: n/a.  8. The above listed medications have been continued, modifications in meds and other orders/labs as follows:      Orders Placed This Encounter   Medications    hydrOXYzine HCl (ATARAX) 10 MG tablet     Sig: Take 1-2 tablets by mouth 3 times daily as needed for Anxiety     Dispense:  90 tablet     Refill:  0        Orders Placed This Encounter   Procedures    External Referral To Counseling Services     Referral Priority:   Routine     Referral Type:   Eval and Treat     Referral Reason:   Specialty Services Required     Requested Specialty:   Clinical Counselor     Number of Visits Requested:   1       9. Additional comments: Begin therapy, discussed sleep hygiene, discussed the use of coping skills and relaxation strategies to manage symptoms.      10.Over 50% of the total visit time of   60  minutes was spent on

## 2024-07-12 ENCOUNTER — TELEPHONE (OUTPATIENT)
Dept: PSYCHIATRY | Age: 44
End: 2024-07-12

## 2024-07-12 NOTE — TELEPHONE ENCOUNTER
Called patient on 07/10/24 to remind them of their appointment on 07/11/24    -Pt confirmed  Reminded patient to complete their visit pre-check/digital registration in [employer].    Electronically signed by Fernanda Loza MA on 7/12/2024 at 10:12 AM

## 2024-07-16 ENCOUNTER — TELEPHONE (OUTPATIENT)
Dept: PSYCHIATRY | Age: 44
End: 2024-07-16

## 2024-07-16 NOTE — TELEPHONE ENCOUNTER
Per direction of ELICEO LEIGH, pt informed:  You can stop taking the Atarax for a few days and see if it is the medication causing the symptoms.   Pt agreeable to the above.  She was encouraged to call back if symptoms do not subside.  Pt stated, \"sounds good\".

## 2024-07-18 ENCOUNTER — TELEPHONE (OUTPATIENT)
Dept: PSYCHIATRY | Age: 44
End: 2024-07-18

## 2024-07-18 NOTE — TELEPHONE ENCOUNTER
Sent referral to [facility] for CBT.    Electronically signed by Fernanda Loza MA on 7/18/2024 at 4:37 PM

## 2024-07-25 DIAGNOSIS — E03.9 ACQUIRED HYPOTHYROIDISM: ICD-10-CM

## 2024-07-25 RX ORDER — LEVOTHYROXINE SODIUM 0.07 MG/1
75 TABLET ORAL DAILY
Qty: 90 TABLET | Refills: 1 | Status: SHIPPED | OUTPATIENT
Start: 2024-07-25

## 2024-07-25 NOTE — TELEPHONE ENCOUNTER
Darcie Ruiz called to request a refill on her medication.      Last office visit : 6/12/2024   Next office visit : 8/12/2024     Requested Prescriptions     Pending Prescriptions Disp Refills    levothyroxine (SYNTHROID) 75 MCG tablet [Pharmacy Med Name: LEVOTHYROXINE SODIUM 75MCG TABLET] 90 tablet 3     Sig: TAKE 1 TABLET BY MOUTH DAILY            Livier Salamanca MA

## 2024-07-26 ENCOUNTER — OFFICE VISIT (OUTPATIENT)
Dept: PSYCHIATRY | Age: 44
End: 2024-07-26
Payer: OTHER GOVERNMENT

## 2024-07-26 ENCOUNTER — TELEPHONE (OUTPATIENT)
Dept: PSYCHIATRY | Age: 44
End: 2024-07-26

## 2024-07-26 VITALS
SYSTOLIC BLOOD PRESSURE: 141 MMHG | DIASTOLIC BLOOD PRESSURE: 83 MMHG | BODY MASS INDEX: 25.96 KG/M2 | HEART RATE: 88 BPM | WEIGHT: 165.4 LBS | TEMPERATURE: 98.1 F | OXYGEN SATURATION: 100 % | HEIGHT: 67 IN

## 2024-07-26 DIAGNOSIS — F32.A DEPRESSION, UNSPECIFIED DEPRESSION TYPE: Primary | ICD-10-CM

## 2024-07-26 DIAGNOSIS — F41.9 ANXIETY DISORDER, UNSPECIFIED TYPE: ICD-10-CM

## 2024-07-26 DIAGNOSIS — F32.A DEPRESSION, UNSPECIFIED DEPRESSION TYPE: ICD-10-CM

## 2024-07-26 LAB — VIT B12 SERPL-MCNC: 628 PG/ML (ref 211–946)

## 2024-07-26 PROCEDURE — 3077F SYST BP >= 140 MM HG: CPT

## 2024-07-26 PROCEDURE — 3079F DIAST BP 80-89 MM HG: CPT

## 2024-07-26 PROCEDURE — 99214 OFFICE O/P EST MOD 30 MIN: CPT

## 2024-07-26 RX ORDER — SERTRALINE HYDROCHLORIDE 25 MG/1
TABLET, FILM COATED ORAL
Qty: 53 TABLET | Refills: 0 | Status: SHIPPED | OUTPATIENT
Start: 2024-07-26 | End: 2024-08-25

## 2024-07-26 RX ORDER — FLUOXETINE HYDROCHLORIDE 20 MG/1
20 CAPSULE ORAL DAILY
Qty: 7 CAPSULE | Refills: 0 | Status: SHIPPED | OUTPATIENT
Start: 2024-07-26

## 2024-07-26 RX ORDER — BUSPIRONE HYDROCHLORIDE 5 MG/1
5 TABLET ORAL 2 TIMES DAILY
Qty: 60 TABLET | Refills: 0 | Status: SHIPPED | OUTPATIENT
Start: 2024-07-26 | End: 2024-08-25

## 2024-07-26 NOTE — PATIENT INSTRUCTIONS
Medication Instructions  Decrease Prozac (fluoxetine) to 20 mg daily for three days and then stop  Start Zoloft (sertraline) 25 mg daily the next day, take for one week, if tolerating, no side effects, increase to 50 mg daily until you come back and see me  Buspirone 5 mg twice a day for anxiety  Discontinue hydroxyzine

## 2024-07-26 NOTE — TELEPHONE ENCOUNTER
Per Jennifer LEIGH called and let patient know her vitamin b12 level is normal. No new orders.       Pt understood     Electronically signed by Dawood Perrin on 7/26/2024 at 11:42 AM

## 2024-07-26 NOTE — TELEPHONE ENCOUNTER
Re-Faxed Referral to [facility] for CBT    Electronically signed by Fernanda Loza MA on 7/26/2024 at 4:02 PM

## 2024-07-26 NOTE — TELEPHONE ENCOUNTER
I called [facility] in regards to a referral status. I was told that we do not have the consent to know if they even received the referral that the office had sent.

## 2024-08-21 ENCOUNTER — TELEPHONE (OUTPATIENT)
Dept: PSYCHIATRY | Age: 44
End: 2024-08-21

## 2024-08-21 NOTE — TELEPHONE ENCOUNTER
Called pt on 08/21/24 for an appointment reminder for 08/22/24. Confirmed  ?   Reminded patient to complete their visit pre-check/digital registration in [employer].

## 2024-08-22 ENCOUNTER — TELEPHONE (OUTPATIENT)
Dept: PSYCHIATRY | Age: 44
End: 2024-08-22

## 2024-08-22 ENCOUNTER — SCHEDULED TELEPHONE ENCOUNTER (OUTPATIENT)
Dept: PSYCHIATRY | Age: 44
End: 2024-08-22
Payer: COMMERCIAL

## 2024-08-22 DIAGNOSIS — F17.200 TOBACCO USE DISORDER: ICD-10-CM

## 2024-08-22 DIAGNOSIS — F41.1 GENERALIZED ANXIETY DISORDER: ICD-10-CM

## 2024-08-22 DIAGNOSIS — F33.0 MILD EPISODE OF RECURRENT MAJOR DEPRESSIVE DISORDER (HCC): Primary | ICD-10-CM

## 2024-08-22 PROCEDURE — 99443 PR PHYS/QHP TELEPHONE EVALUATION 21-30 MIN: CPT

## 2024-08-22 ASSESSMENT — PATIENT HEALTH QUESTIONNAIRE - PHQ9
SUM OF ALL RESPONSES TO PHQ9 QUESTIONS 1 & 2: 2
SUM OF ALL RESPONSES TO PHQ QUESTIONS 1-9: 4
SUM OF ALL RESPONSES TO PHQ QUESTIONS 1-9: 4
8. MOVING OR SPEAKING SO SLOWLY THAT OTHER PEOPLE COULD HAVE NOTICED. OR THE OPPOSITE, BEING SO FIGETY OR RESTLESS THAT YOU HAVE BEEN MOVING AROUND A LOT MORE THAN USUAL: NOT AT ALL
3. TROUBLE FALLING OR STAYING ASLEEP: NOT AT ALL
2. FEELING DOWN, DEPRESSED OR HOPELESS: SEVERAL DAYS
5. POOR APPETITE OR OVEREATING: SEVERAL DAYS
SUM OF ALL RESPONSES TO PHQ QUESTIONS 1-9: 4
9. THOUGHTS THAT YOU WOULD BE BETTER OFF DEAD, OR OF HURTING YOURSELF: NOT AT ALL
4. FEELING TIRED OR HAVING LITTLE ENERGY: SEVERAL DAYS
SUM OF ALL RESPONSES TO PHQ QUESTIONS 1-9: 4
7. TROUBLE CONCENTRATING ON THINGS, SUCH AS READING THE NEWSPAPER OR WATCHING TELEVISION: NOT AT ALL
1. LITTLE INTEREST OR PLEASURE IN DOING THINGS: SEVERAL DAYS

## 2024-08-22 NOTE — PROGRESS NOTES
Darcie Ruiz is a 44 y.o. female evaluated via telephone on 8/22/2024 for Medication Check  .        Documentation:  I communicated with the patient and/or health care decision maker about see below.   Details of this discussion including any medical advice provided: see below    Total Time: minutes: 21-30 minutes    Darcie Ruiz was evaluated through a synchronous (real-time) audio encounter. Patient identification was verified at the start of the visit. She (or guardian if applicable) is aware that this is a billable service, which includes applicable co-pays. This visit was conducted with the patient's (and/or legal guardian's) verbal consent. She has not had a related appointment within my department in the past 7 days or scheduled within the next 24 hours.   The patient was located at [address] [place] [address] .  The provider was located at Facility (Appt Dept): [address] [facility].  [address]  [place] [address].    Note: not billable if this call serves to triage the patient into an appointment for the relevant concern    LU Marshall - RAE      8/22/2024        Progress Note    Darcie Ruiz 1980      Chief Complaint   Patient presents with    Medication Check         Subjective:    Patient is a 44 y.o. female diagnosed with depression, anxiety and presents today for follow-up.  Last seen in clinic on 7/26/24  and prior records were reviewed.    Telehealth visit today.  We initiated Zoloft last time for anxiety and depression, patient has tolerated well, denies any negative or unwanted side effects.  She reports it has been beneficial, \"I feel like I am starting to feel like myself again, it has helped a lot\".  She reports she has no longer needed as needed clonazepam that was prescribed by PCP.  She reports she is utilizing buspirone for anxiety.  She reports she has been able to get back to daily functions, including work, spending time with family and friends.  Her 2 kids

## 2024-08-22 NOTE — TELEPHONE ENCOUNTER
Pt was called for virtual appointment check in.    Electronically signed by Fernanda Loza MA on 8/22/2024 at 9:20 AM

## 2024-08-25 DIAGNOSIS — E87.6 HYPOKALEMIA: ICD-10-CM

## 2024-08-25 DIAGNOSIS — E78.00 PURE HYPERCHOLESTEROLEMIA: ICD-10-CM

## 2024-08-25 DIAGNOSIS — E55.9 VITAMIN D DEFICIENCY: ICD-10-CM

## 2024-08-25 DIAGNOSIS — I10 ESSENTIAL HYPERTENSION: Primary | ICD-10-CM

## 2024-08-25 DIAGNOSIS — E03.9 ACQUIRED HYPOTHYROIDISM: ICD-10-CM

## 2024-08-28 ENCOUNTER — TELEMEDICINE (OUTPATIENT)
Dept: PRIMARY CARE CLINIC | Age: 44
End: 2024-08-28
Payer: COMMERCIAL

## 2024-08-28 DIAGNOSIS — E03.9 ACQUIRED HYPOTHYROIDISM: ICD-10-CM

## 2024-08-28 DIAGNOSIS — E55.9 VITAMIN D DEFICIENCY: ICD-10-CM

## 2024-08-28 DIAGNOSIS — E66.3 OVERWEIGHT (BMI 25.0-29.9): ICD-10-CM

## 2024-08-28 DIAGNOSIS — I10 ESSENTIAL HYPERTENSION: Primary | ICD-10-CM

## 2024-08-28 PROCEDURE — G8428 CUR MEDS NOT DOCUMENT: HCPCS | Performed by: INTERNAL MEDICINE

## 2024-08-28 PROCEDURE — G8419 CALC BMI OUT NRM PARAM NOF/U: HCPCS | Performed by: INTERNAL MEDICINE

## 2024-08-28 PROCEDURE — 4004F PT TOBACCO SCREEN RCVD TLK: CPT | Performed by: INTERNAL MEDICINE

## 2024-08-28 PROCEDURE — 99214 OFFICE O/P EST MOD 30 MIN: CPT | Performed by: INTERNAL MEDICINE

## 2024-08-28 RX ORDER — CHOLECALCIFEROL (VITAMIN D3) 50 MCG
2000 TABLET ORAL DAILY
COMMUNITY
Start: 2024-08-28

## 2024-08-28 NOTE — PROGRESS NOTES
2024    TELEHEALTH EVALUATION -- Audio/Visual    HPI:    Darcie Ruiz (:  1980) has requested an audio/video evaluation for the following concern(s):    Patient has not been taking her daily vitamin D this Summer since she has been out in the sun more. She has orders to get labs updated however and will do those sometime in the 1-2 weeks.     Hypertension  Patient is here for follow-up of elevated blood pressure. Patient is checking blood pressure at home. Blood pressure is controlled. Cardiac symptoms: none. Use of agents associated with hypertension: none.  Blood pressure usually 140s/80s when patient wakes up but then blood pressure running drops to 120s/70s to low 80s after she takes her morning blood pressure medicine.     Generalized anxiety disorder-patient has been going to [facility] for her major depressive disorder and generalized anxiety disorder with medicine changed to sertraline 50 mg daily, which has much more effective.     Hypothyroidism  Patient presents for follow up on thyroid function. Symptoms consist of denies fatigue, weight changes, heat/cold intolerance, bowel/skin changes or CVS symptoms. The problem has been gradually improving. Patient has been compliant with levothyroxine.     Review of Systems   Constitutional:  Negative for appetite change, chills, fatigue and fever.   HENT:  Negative for ear pain, rhinorrhea, sinus pressure, sore throat and voice change.    Eyes:  Negative for pain, discharge and redness.   Respiratory:  Negative for cough, chest tightness, shortness of breath and wheezing.    Cardiovascular:  Negative for chest pain and palpitations.   Gastrointestinal:  Negative for abdominal pain, blood in stool, diarrhea and vomiting.   Endocrine: Negative for cold intolerance, heat intolerance and polydipsia.   Genitourinary:  Negative for dysuria and hematuria.   Musculoskeletal:  Negative for arthralgias, myalgias, neck pain and neck stiffness.   Skin:

## 2024-08-29 ENCOUNTER — TELEPHONE (OUTPATIENT)
Dept: PRIMARY CARE CLINIC | Age: 44
End: 2024-08-29

## 2024-09-02 DIAGNOSIS — F32.A DEPRESSION, UNSPECIFIED DEPRESSION TYPE: ICD-10-CM

## 2024-09-04 NOTE — TELEPHONE ENCOUNTER
8/22/2024  [facility]       Jennifer Abdalla APRN - CNP  Nurse Practitioner Psychiatric/Mental Health Mild episode of recurrent major depressive disorder (HCC) +2 more  Dx Medication Check  Reason for Visit     Progress Notes  Jennifer Abdalla APRN - CNP (Nurse Practitioner)  Nurse Practitioner Psychiatric/Mental Health  The note has been blocked from the patient portal for the following reason: Likely risk of substantial harm  Expand All Collapse All  Darcie Ruiz is a 44 y.o. female evaluated via telephone on 8/22/2024 for Medication Check  .          Documentation:  I communicated with the patient and/or health care decision maker about see below.   Details of this discussion including any medical advice provided: see below     Total Time: minutes: 21-30 minutes     Darcie Ruiz was evaluated through a synchronous (real-time) audio encounter. Patient identification was verified at the start of the visit. She (or guardian if applicable) is aware that this is a billable service, which includes applicable co-pays. This visit was conducted with the patient's (and/or legal guardian's) verbal consent. She has not had a related appointment within my department in the past 7 days or scheduled within the next 24 hours.   The patient was located at [address] [place] [address] .  The provider was located at Facility (Appt Dept): [facility].  [address]  [place] [address].     Note: not billable if this call serves to triage the patient into an appointment for the relevant concern     LU Marshall CNP        8/22/2024                                         Progress Note     Darcie Ruiz 1980                          Chief Complaint   Patient presents with    Medication Check            Subjective:    Patient is a 44 y.o. female diagnosed with depression, anxiety and presents today for follow-up.  Last seen in clinic on 7/26/24  and prior records were

## 2024-09-20 ENCOUNTER — TELEPHONE (OUTPATIENT)
Dept: PSYCHIATRY | Age: 44
End: 2024-09-20

## 2024-10-01 DIAGNOSIS — F32.A DEPRESSION, UNSPECIFIED DEPRESSION TYPE: ICD-10-CM

## 2024-10-01 DIAGNOSIS — F41.9 ANXIETY DISORDER, UNSPECIFIED TYPE: ICD-10-CM

## 2024-10-01 RX ORDER — BUSPIRONE HYDROCHLORIDE 5 MG/1
5 TABLET ORAL 2 TIMES DAILY
Qty: 60 TABLET | Refills: 0 | Status: SHIPPED | OUTPATIENT
Start: 2024-10-01 | End: 2024-10-31

## 2024-10-01 NOTE — TELEPHONE ENCOUNTER
Pharmacy sent a request to refill pt's medication       Last office visit : 8/22/2024 ELICEO LEIGH  Next office visit : 11/21/2024 ELICEO LEIGH    Requested Prescriptions     Pending Prescriptions Disp Refills    sertraline (ZOLOFT) 50 MG tablet [Pharmacy Med Name: SERTRALINE HYDROCHLORIDE 50MG TABLET] 30 tablet 2     Sig: TAKE 1 TABLET BY MOUTH DAILY    busPIRone (BUSPAR) 5 MG tablet [Pharmacy Med Name: BUSPIRONE HYDROCHLORIDE 5MG TABLET] 60 tablet 0     Sig: TAKE 1 TABLET BY MOUTH 2 TIMES DAILY            Shameca Harford      8/22/2024  [facility]       Jennifer Abdalla APRN - CNP  Nurse Practitioner Psychiatric/Mental Health Mild episode of recurrent major depressive disorder (HCC) +2 more  Dx Medication Check  Reason for Visit     Progress Notes  Jennifer Abdalla APRN - CNP (Nurse Practitioner)  Nurse Practitioner Psychiatric/Mental Health  The note has been blocked from the patient portal for the following reason: Likely risk of substantial harm  Expand All Collapse All  Darcie Ruiz is a 44 y.o. female evaluated via telephone on 8/22/2024 for Medication Check  .          Documentation:  I communicated with the patient and/or health care decision maker about see below.   Details of this discussion including any medical advice provided: see below     Total Time: minutes: 21-30 minutes     Darcie Ruiz was evaluated through a synchronous (real-time) audio encounter. Patient identification was verified at the start of the visit. She (or guardian if applicable) is aware that this is a billable service, which includes applicable co-pays. This visit was conducted with the patient's (and/or legal guardian's) verbal consent. She has not had a related appointment within my department in the past 7 days or scheduled within the next 24 hours.   The patient was located at [address] [place] [address] .  The provider was located at Facility (Appt Dept): [address] [place] [address].  Suite

## 2024-10-04 DIAGNOSIS — F41.1 GAD (GENERALIZED ANXIETY DISORDER): ICD-10-CM

## 2024-10-04 RX ORDER — CLONAZEPAM 0.5 MG/1
0.5 TABLET ORAL EVERY 12 HOURS PRN
Qty: 30 TABLET | Refills: 0 | OUTPATIENT
Start: 2024-10-04 | End: 2024-11-03

## 2024-10-04 NOTE — TELEPHONE ENCOUNTER
Darcie Ruiz called to request a refill on her medication.      Last office visit : 8/28/2024   Next office visit : 12/30/2024     Last UDS:   Benzodiazepine Screen, Urine   Date Value Ref Range Status   06/12/2024 n  Final     Buprenorphine Urine   Date Value Ref Range Status   06/12/2024 n  Final     Cocaine Metabolite Screen, Urine   Date Value Ref Range Status   06/12/2024 n  Final     Gabapentin Screen, Urine   Date Value Ref Range Status   06/12/2024 n  Final     Oxycodone Screen, Ur   Date Value Ref Range Status   06/12/2024 n  Final     Propoxyphene Screen, Urine   Date Value Ref Range Status   06/12/2024 n  Final     THC Screen, Urine   Date Value Ref Range Status   06/12/2024 n  Final     Tricyclic Antidepressants, Urine   Date Value Ref Range Status   06/12/2024 n  Final       Last Catrachito: 8/20/2024  Medication Contract: 07/19/2024   Last Fill: March 2024    Requested Prescriptions     Pending Prescriptions Disp Refills    clonazePAM (KLONOPIN) 0.5 MG tablet [Pharmacy Med Name: CLONAZEPAM 0.5MG TABLET] 30 tablet 0     Sig: Take 1 tablet by mouth every 12 hours as needed for Anxiety for up to 30 days. Max Daily Amount: 1 mg         Please approve or refuse this medication.   Livier Salamanca MA

## 2024-10-22 DIAGNOSIS — E78.00 PURE HYPERCHOLESTEROLEMIA: ICD-10-CM

## 2024-10-22 DIAGNOSIS — E03.9 ACQUIRED HYPOTHYROIDISM: ICD-10-CM

## 2024-10-22 DIAGNOSIS — I10 ESSENTIAL HYPERTENSION: ICD-10-CM

## 2024-10-22 DIAGNOSIS — E87.6 HYPOKALEMIA: ICD-10-CM

## 2024-10-22 DIAGNOSIS — E55.9 VITAMIN D DEFICIENCY: ICD-10-CM

## 2024-10-22 LAB
25(OH)D3 SERPL-MCNC: 35.1 NG/ML
ALBUMIN SERPL-MCNC: 4.6 G/DL (ref 3.5–5.2)
ALP SERPL-CCNC: 83 U/L (ref 35–104)
ALT SERPL-CCNC: 22 U/L (ref 5–33)
ANION GAP SERPL CALCULATED.3IONS-SCNC: 12 MMOL/L (ref 7–19)
AST SERPL-CCNC: 19 U/L (ref 5–32)
BILIRUB SERPL-MCNC: <0.2 MG/DL (ref 0.2–1.2)
BUN SERPL-MCNC: 12 MG/DL (ref 6–20)
CALCIUM SERPL-MCNC: 9 MG/DL (ref 8.6–10)
CHLORIDE SERPL-SCNC: 100 MMOL/L (ref 98–111)
CHOLEST SERPL-MCNC: 236 MG/DL (ref 0–199)
CO2 SERPL-SCNC: 26 MMOL/L (ref 22–29)
CREAT SERPL-MCNC: 0.6 MG/DL (ref 0.5–0.9)
GLUCOSE SERPL-MCNC: 92 MG/DL (ref 70–99)
HDLC SERPL-MCNC: 78 MG/DL (ref 40–60)
LDLC SERPL CALC-MCNC: 139 MG/DL
POTASSIUM SERPL-SCNC: 4.4 MMOL/L (ref 3.5–5)
PROT SERPL-MCNC: 7.3 G/DL (ref 6.4–8.3)
SODIUM SERPL-SCNC: 138 MMOL/L (ref 136–145)
T4 FREE SERPL-MCNC: 1.15 NG/DL (ref 0.93–1.7)
TRIGL SERPL-MCNC: 95 MG/DL (ref 0–149)
TSH SERPL DL<=0.005 MIU/L-ACNC: 4.37 UIU/ML (ref 0.27–4.2)

## 2024-10-23 DIAGNOSIS — E03.9 ACQUIRED HYPOTHYROIDISM: Primary | ICD-10-CM

## 2024-10-23 RX ORDER — LEVOTHYROXINE SODIUM 88 UG/1
88 TABLET ORAL DAILY
Qty: 90 TABLET | Refills: 1 | Status: SHIPPED | OUTPATIENT
Start: 2024-10-23

## 2024-11-18 DIAGNOSIS — I10 ESSENTIAL HYPERTENSION: ICD-10-CM

## 2024-11-18 RX ORDER — AMLODIPINE BESYLATE 10 MG/1
5 TABLET ORAL DAILY
Qty: 30 TABLET | Refills: 5 | Status: SHIPPED
Start: 2024-11-18

## 2024-11-20 ENCOUNTER — TELEPHONE (OUTPATIENT)
Dept: PSYCHIATRY | Age: 44
End: 2024-11-20

## 2024-11-20 NOTE — TELEPHONE ENCOUNTER
Called patient to remind them of their appointment   -Pt confirmed      Reminded patient of their     copay for their appointment. Reminded patient to complete their visit pre-check/digital registration in [facility].    Electronically signed by Alicia D Giraldo-Reyes on 11/20/2024 at 10:45 AM

## 2024-11-21 ENCOUNTER — SCHEDULED TELEPHONE ENCOUNTER (OUTPATIENT)
Dept: PSYCHIATRY | Age: 44
End: 2024-11-21
Payer: OTHER GOVERNMENT

## 2024-11-21 DIAGNOSIS — F17.200 TOBACCO USE DISORDER: ICD-10-CM

## 2024-11-21 DIAGNOSIS — F33.0 MILD EPISODE OF RECURRENT MAJOR DEPRESSIVE DISORDER (HCC): Primary | ICD-10-CM

## 2024-11-21 DIAGNOSIS — F41.1 GENERALIZED ANXIETY DISORDER: ICD-10-CM

## 2024-11-21 PROCEDURE — 99442 PR PHYS/QHP TELEPHONE EVALUATION 11-20 MIN: CPT

## 2024-11-21 RX ORDER — BUSPIRONE HYDROCHLORIDE 5 MG/1
5 TABLET ORAL 2 TIMES DAILY
Qty: 60 TABLET | Refills: 5 | Status: SHIPPED | OUTPATIENT
Start: 2024-11-21 | End: 2025-05-20

## 2024-11-21 ASSESSMENT — PATIENT HEALTH QUESTIONNAIRE - PHQ9
4. FEELING TIRED OR HAVING LITTLE ENERGY: SEVERAL DAYS
SUM OF ALL RESPONSES TO PHQ9 QUESTIONS 1 & 2: 0
SUM OF ALL RESPONSES TO PHQ QUESTIONS 1-9: 1
9. THOUGHTS THAT YOU WOULD BE BETTER OFF DEAD, OR OF HURTING YOURSELF: NOT AT ALL
3. TROUBLE FALLING OR STAYING ASLEEP: NOT AT ALL
1. LITTLE INTEREST OR PLEASURE IN DOING THINGS: NOT AT ALL
2. FEELING DOWN, DEPRESSED OR HOPELESS: NOT AT ALL
6. FEELING BAD ABOUT YOURSELF - OR THAT YOU ARE A FAILURE OR HAVE LET YOURSELF OR YOUR FAMILY DOWN: NOT AT ALL
10. IF YOU CHECKED OFF ANY PROBLEMS, HOW DIFFICULT HAVE THESE PROBLEMS MADE IT FOR YOU TO DO YOUR WORK, TAKE CARE OF THINGS AT HOME, OR GET ALONG WITH OTHER PEOPLE: NOT DIFFICULT AT ALL
5. POOR APPETITE OR OVEREATING: NOT AT ALL
SUM OF ALL RESPONSES TO PHQ QUESTIONS 1-9: 1
7. TROUBLE CONCENTRATING ON THINGS, SUCH AS READING THE NEWSPAPER OR WATCHING TELEVISION: NOT AT ALL
8. MOVING OR SPEAKING SO SLOWLY THAT OTHER PEOPLE COULD HAVE NOTICED. OR THE OPPOSITE, BEING SO FIGETY OR RESTLESS THAT YOU HAVE BEEN MOVING AROUND A LOT MORE THAN USUAL: NOT AT ALL

## 2024-11-21 NOTE — PROGRESS NOTES
Darcie Ruiz is a 44 y.o. female evaluated via telephone on 11/21/2024 for Follow-up  .        Documentation:  I communicated with the patient and/or health care decision maker about see below.   Details of this discussion including any medical advice provided: see below    Total Time: minutes: 21-30 minutes    Darcie Ruiz was evaluated through a synchronous (real-time) audio encounter. Patient identification was verified at the start of the visit. She (or guardian if applicable) is aware that this is a billable service, which includes applicable co-pays. This visit was conducted with the patient's (and/or legal guardian's) verbal consent. She has not had a related appointment within my department in the past 7 days or scheduled within the next 24 hours.   The patient was located at [address] [place] [address] .  The provider was located at Facility (Appt Dept): [address] [facility].  [address]  [place] [address].    Note: not billable if this call serves to triage the patient into an appointment for the relevant concern    LU Marshall - RAE      11/21/2024        Progress Note    Darcie Ruiz 1980      Chief Complaint   Patient presents with    Follow-up         Subjective:    Patient is a 44 y.o. female diagnosed with depression, anxiety and presents today for follow-up.  Last seen in clinic on 8/22/2024  and prior records were reviewed.    Telehealth visit today.  Currently take Zoloft, buspirone.  Reports compliance with medications, denies any negative or unwanted side effects.  Referred to [facility] for therapy, however when she got setup it was only medication management.  She reports anxiety has been well-controlled, does not feel that she needs psychotherapy at this time.  Did discuss with patient that provider will be leaving office next week, she does reports she is planning on moving her medication management to [facility] due to this provider leaving as

## 2024-12-02 DIAGNOSIS — I10 ESSENTIAL HYPERTENSION: ICD-10-CM

## 2024-12-02 RX ORDER — AMLODIPINE BESYLATE 10 MG/1
5 TABLET ORAL DAILY
Qty: 30 TABLET | Refills: 5 | OUTPATIENT
Start: 2024-12-02

## 2024-12-04 DIAGNOSIS — I10 ESSENTIAL HYPERTENSION: ICD-10-CM

## 2024-12-04 RX ORDER — AMLODIPINE BESYLATE 10 MG/1
5 TABLET ORAL DAILY
Qty: 30 TABLET | Refills: 5 | OUTPATIENT
Start: 2024-12-04

## 2024-12-04 RX ORDER — AMLODIPINE BESYLATE 10 MG/1
10 TABLET ORAL DAILY
Qty: 30 TABLET | Refills: 2 | Status: SHIPPED | OUTPATIENT
Start: 2024-12-04

## 2024-12-09 DIAGNOSIS — I10 ESSENTIAL HYPERTENSION: ICD-10-CM

## 2024-12-10 NOTE — TELEPHONE ENCOUNTER
Darcie Ruiz called to request a refill on her medication.      Last office visit : 8/28/2024   Next office visit : 12/30/2024     Requested Prescriptions     Pending Prescriptions Disp Refills    lisinopril (PRINIVIL;ZESTRIL) 20 MG tablet [Pharmacy Med Name: LISINOPRIL 20MG TABLET] 30 tablet 5     Sig: TAKE 1 TABLET BY MOUTH DAILY            Dalila Gerard MA

## 2024-12-11 RX ORDER — LISINOPRIL 20 MG/1
20 TABLET ORAL DAILY
Qty: 30 TABLET | Refills: 5 | Status: SHIPPED | OUTPATIENT
Start: 2024-12-11

## 2024-12-18 ENCOUNTER — OFFICE VISIT (OUTPATIENT)
Dept: OBGYN CLINIC | Age: 44
End: 2024-12-18
Payer: OTHER GOVERNMENT

## 2024-12-18 VITALS
SYSTOLIC BLOOD PRESSURE: 137 MMHG | BODY MASS INDEX: 28.66 KG/M2 | DIASTOLIC BLOOD PRESSURE: 82 MMHG | WEIGHT: 183 LBS | HEART RATE: 91 BPM

## 2024-12-18 DIAGNOSIS — Z12.39 ENCOUNTER FOR SCREENING BREAST EXAMINATION: ICD-10-CM

## 2024-12-18 DIAGNOSIS — Z12.4 SCREENING FOR CERVICAL CANCER: ICD-10-CM

## 2024-12-18 DIAGNOSIS — Z01.419 WELL WOMAN EXAM WITH ROUTINE GYNECOLOGICAL EXAM: Primary | ICD-10-CM

## 2024-12-18 DIAGNOSIS — Z12.31 ENCOUNTER FOR SCREENING MAMMOGRAM FOR MALIGNANT NEOPLASM OF BREAST: ICD-10-CM

## 2024-12-18 PROCEDURE — 3079F DIAST BP 80-89 MM HG: CPT | Performed by: NURSE PRACTITIONER

## 2024-12-18 PROCEDURE — 3075F SYST BP GE 130 - 139MM HG: CPT | Performed by: NURSE PRACTITIONER

## 2024-12-18 PROCEDURE — 99396 PREV VISIT EST AGE 40-64: CPT | Performed by: NURSE PRACTITIONER

## 2024-12-18 NOTE — PROGRESS NOTES
Pt presents today for pap smear and breast exam.      Last mammogram:  10/2023  Last pap smear:  2023  Contraception, tubal, vasectomy or condoms:  nothing  :  2  Para:  2  AB:  0  Last bone density:  never  Last colonoscopy:   never  Sexual Active: yes    
and nursing note reviewed.   Constitutional:       Appearance: She is well-developed.   HENT:      Head: Normocephalic and atraumatic.   Eyes:      Conjunctiva/sclera: Conjunctivae normal.      Pupils: Pupils are equal, round, and reactive to light.   Neck:      Thyroid: No thyromegaly.      Trachea: No tracheal deviation.   Cardiovascular:      Rate and Rhythm: Normal rate and regular rhythm.      Heart sounds: Normal heart sounds. No murmur heard.  Pulmonary:      Effort: Pulmonary effort is normal. No respiratory distress.      Breath sounds: Normal breath sounds.   Chest:      Comments: Breasts symmetrical without tenderness, masses, or nipple discharge. Nipples everted bilaterally.    Abdominal:      General: There is no distension.      Palpations: Abdomen is soft.      Tenderness: There is no abdominal tenderness. There is no guarding.   Genitourinary:     General: Normal vulva.      Exam position: Lithotomy position.      Labia:         Right: No rash or lesion.         Left: No rash or lesion.       Vagina: Normal. No erythema or lesions.      Cervix: Normal.      Uterus: Normal. Not tender.       Adnexa: Right adnexa normal and left adnexa normal.        Right: No mass, tenderness or fullness.          Left: No mass, tenderness or fullness.     Musculoskeletal:         General: Normal range of motion.      Cervical back: Normal range of motion and neck supple.   Skin:     General: Skin is warm and dry.      Capillary Refill: Capillary refill takes less than 2 seconds.   Neurological:      Mental Status: She is alert and oriented to person, place, and time.   Psychiatric:         Behavior: Behavior normal.         Thought Content: Thought content normal.         Judgment: Judgment normal.          Diagnosis Orders   1. Well woman exam with routine gynecological exam  PAP SMEAR    Human papillomavirus (HPV) DNA probe thin prep high risk      2. Screening for cervical cancer  PAP SMEAR    Human papillomavirus

## 2024-12-18 NOTE — PATIENT INSTRUCTIONS
over the counter supplements with you to each visit.    If your provider has ordered testing for you, please be sure to follow up with our office if you have not received results within 7 days after testing took place.    *If you receive a survey after visiting one of our offices, please take the time to share your experience concerning your physician office visit. These surveys are confidential and no health information about you is shared. We are eager to improve for you and we are counting on your feedback to help make that happen.

## 2024-12-30 ENCOUNTER — OFFICE VISIT (OUTPATIENT)
Dept: PRIMARY CARE CLINIC | Age: 44
End: 2024-12-30
Payer: OTHER GOVERNMENT

## 2024-12-30 VITALS
DIASTOLIC BLOOD PRESSURE: 80 MMHG | HEART RATE: 83 BPM | OXYGEN SATURATION: 99 % | TEMPERATURE: 98.4 F | SYSTOLIC BLOOD PRESSURE: 132 MMHG | WEIGHT: 189.38 LBS | BODY MASS INDEX: 29.66 KG/M2

## 2024-12-30 DIAGNOSIS — E03.9 ACQUIRED HYPOTHYROIDISM: ICD-10-CM

## 2024-12-30 DIAGNOSIS — I10 ESSENTIAL HYPERTENSION: ICD-10-CM

## 2024-12-30 DIAGNOSIS — F17.200 MILD TOBACCO ABUSE: ICD-10-CM

## 2024-12-30 DIAGNOSIS — F41.1 GENERALIZED ANXIETY DISORDER: ICD-10-CM

## 2024-12-30 DIAGNOSIS — E66.3 OVERWEIGHT (BMI 25.0-29.9): ICD-10-CM

## 2024-12-30 DIAGNOSIS — Z00.01 ENCOUNTER FOR WELL ADULT EXAM WITH ABNORMAL FINDINGS: Primary | ICD-10-CM

## 2024-12-30 DIAGNOSIS — F33.0 MILD EPISODE OF RECURRENT MAJOR DEPRESSIVE DISORDER (HCC): ICD-10-CM

## 2024-12-30 DIAGNOSIS — E78.00 PURE HYPERCHOLESTEROLEMIA: ICD-10-CM

## 2024-12-30 DIAGNOSIS — E55.9 VITAMIN D DEFICIENCY: ICD-10-CM

## 2024-12-30 LAB
T3FREE SERPL-MCNC: 2.6 PG/ML (ref 2–4.4)
T4 FREE SERPL-MCNC: 1.05 NG/DL (ref 0.93–1.7)
TSH SERPL DL<=0.005 MIU/L-ACNC: 4.5 UIU/ML (ref 0.27–4.2)

## 2024-12-30 PROCEDURE — 3075F SYST BP GE 130 - 139MM HG: CPT | Performed by: INTERNAL MEDICINE

## 2024-12-30 PROCEDURE — 99396 PREV VISIT EST AGE 40-64: CPT | Performed by: INTERNAL MEDICINE

## 2024-12-30 PROCEDURE — 3079F DIAST BP 80-89 MM HG: CPT | Performed by: INTERNAL MEDICINE

## 2024-12-30 RX ORDER — BUSPIRONE HYDROCHLORIDE 5 MG/1
5 TABLET ORAL 2 TIMES DAILY PRN
Qty: 60 TABLET | Refills: 2 | Status: SHIPPED | OUTPATIENT
Start: 2024-12-30

## 2024-12-30 RX ORDER — AMLODIPINE BESYLATE 10 MG/1
10 TABLET ORAL DAILY
Qty: 90 TABLET | Refills: 1 | Status: SHIPPED | OUTPATIENT
Start: 2024-12-30

## 2024-12-30 RX ORDER — LISINOPRIL 20 MG/1
20 TABLET ORAL DAILY
Qty: 90 TABLET | Refills: 3 | Status: SHIPPED | OUTPATIENT
Start: 2024-12-30

## 2024-12-30 ASSESSMENT — ENCOUNTER SYMPTOMS
SINUS PRESSURE: 0
EYE DISCHARGE: 0
SHORTNESS OF BREATH: 0
EYE PAIN: 0
ABDOMINAL PAIN: 0
VOICE CHANGE: 0
COUGH: 0
VOMITING: 0
EYE REDNESS: 0
SORE THROAT: 0
CHEST TIGHTNESS: 0
COLOR CHANGE: 0
DIARRHEA: 0
WHEEZING: 0
BLOOD IN STOOL: 0
RHINORRHEA: 0

## 2024-12-30 NOTE — PROGRESS NOTES
anxiety disorder  -     busPIRone (BUSPAR) 5 MG tablet; Take 1 tablet by mouth 2 times daily as needed (Anxiety), Disp-60 tablet, R-2Normal  -     sertraline (ZOLOFT) 50 MG tablet; Take 1 tablet by mouth daily, Disp-30 tablet, R-5Normal  Mild episode of recurrent major depressive disorder (HCC)  -     sertraline (ZOLOFT) 50 MG tablet; Take 1 tablet by mouth daily, Disp-30 tablet, R-5Normal  Vitamin D deficiency  Mild tobacco abuse  Overweight (BMI 25.0-29.9)    Results  Laboratory Studies  TSH was 4.37. Vitamin D level was 35.1. Total cholesterol was 236, HDL was 78, LDL was 139.     Return in about 4 months (around 4/30/2025) for HTN, recheck mood, high cholesterol.     Subjective   History of Present Illness  The patient is a 44-year-old white female here for her annual wellness visit and follow-up on essential hypertension, acquired hypothyroidism, vitamin D deficiency, mild major depression, and generalized anxiety disorder with medication refills.    She attempted to reduce her amlodipine dosage from 10 mg to 5 mg daily, but this was unsuccessful. She has not been monitoring her blood pressure at home. She continues to take lisinopril 20 mg daily.    Her mood remains stable. She requires a refill of her sertraline 50 mg daily, which she takes for depression and anxiety. She has an adequate supply of BuSpar 5 mg twice daily at home and does not require a refill. She previously saw Jennifer Abdalla, a psychiatric nurse practitioner, but has since transitioned to Dr. Collier through [facility], who is willing to manage her medications.    She is consistent with her levothyroxine 88 mcg daily regimen. She does not take biotin supplements. Her TSH levels have not been assessed since starting the 88 mcg dose.    She has resumed her vitamin D2 1000 units daily supplementation, which she typically discontinues during the summer months.    She reports a decrease in her smoking habit, currently consuming 4 to 5 cigarettes

## 2025-01-06 DIAGNOSIS — E78.00 PURE HYPERCHOLESTEROLEMIA: ICD-10-CM

## 2025-01-06 DIAGNOSIS — E87.6 HYPOKALEMIA: ICD-10-CM

## 2025-01-06 DIAGNOSIS — I10 ESSENTIAL HYPERTENSION: Primary | ICD-10-CM

## 2025-01-06 DIAGNOSIS — E55.9 VITAMIN D DEFICIENCY: ICD-10-CM

## 2025-01-06 DIAGNOSIS — E03.9 ACQUIRED HYPOTHYROIDISM: ICD-10-CM

## 2025-02-04 DIAGNOSIS — E03.9 ACQUIRED HYPOTHYROIDISM: ICD-10-CM

## 2025-02-04 RX ORDER — LEVOTHYROXINE SODIUM 88 UG/1
88 TABLET ORAL DAILY
Qty: 90 TABLET | Refills: 1 | OUTPATIENT
Start: 2025-02-04

## 2025-02-04 NOTE — TELEPHONE ENCOUNTER
Darcie Ruiz called to request a refill on her medication.      Last office visit : 12/30/2024   Next office visit : 4/30/2025     Requested Prescriptions     Pending Prescriptions Disp Refills    levothyroxine (SYNTHROID) 88 MCG tablet [Pharmacy Med Name: LEVOTHYROXINE SODIUM 88MCG TABLET] 90 tablet 1     Sig: TAKE 1 TABLET BY MOUTH DAILY            Dalila Gerard MA

## 2025-05-11 SDOH — ECONOMIC STABILITY: FOOD INSECURITY: WITHIN THE PAST 12 MONTHS, THE FOOD YOU BOUGHT JUST DIDN'T LAST AND YOU DIDN'T HAVE MONEY TO GET MORE.: NEVER TRUE

## 2025-05-11 SDOH — ECONOMIC STABILITY: FOOD INSECURITY: WITHIN THE PAST 12 MONTHS, YOU WORRIED THAT YOUR FOOD WOULD RUN OUT BEFORE YOU GOT MONEY TO BUY MORE.: NEVER TRUE

## 2025-05-11 SDOH — ECONOMIC STABILITY: INCOME INSECURITY: IN THE LAST 12 MONTHS, WAS THERE A TIME WHEN YOU WERE NOT ABLE TO PAY THE MORTGAGE OR RENT ON TIME?: NO

## 2025-05-11 SDOH — ECONOMIC STABILITY: TRANSPORTATION INSECURITY
IN THE PAST 12 MONTHS, HAS THE LACK OF TRANSPORTATION KEPT YOU FROM MEDICAL APPOINTMENTS OR FROM GETTING MEDICATIONS?: NO

## 2025-05-11 ASSESSMENT — PATIENT HEALTH QUESTIONNAIRE - PHQ9
3. TROUBLE FALLING OR STAYING ASLEEP: NOT AT ALL
9. THOUGHTS THAT YOU WOULD BE BETTER OFF DEAD, OR OF HURTING YOURSELF: NOT AT ALL
1. LITTLE INTEREST OR PLEASURE IN DOING THINGS: SEVERAL DAYS
2. FEELING DOWN, DEPRESSED OR HOPELESS: SEVERAL DAYS
8. MOVING OR SPEAKING SO SLOWLY THAT OTHER PEOPLE COULD HAVE NOTICED. OR THE OPPOSITE, BEING SO FIGETY OR RESTLESS THAT YOU HAVE BEEN MOVING AROUND A LOT MORE THAN USUAL: NOT AT ALL
4. FEELING TIRED OR HAVING LITTLE ENERGY: SEVERAL DAYS
5. POOR APPETITE OR OVEREATING: NOT AT ALL
7. TROUBLE CONCENTRATING ON THINGS, SUCH AS READING THE NEWSPAPER OR WATCHING TELEVISION: NOT AT ALL
SUM OF ALL RESPONSES TO PHQ QUESTIONS 1-9: 4
2. FEELING DOWN, DEPRESSED OR HOPELESS: SEVERAL DAYS
8. MOVING OR SPEAKING SO SLOWLY THAT OTHER PEOPLE COULD HAVE NOTICED. OR THE OPPOSITE - BEING SO FIDGETY OR RESTLESS THAT YOU HAVE BEEN MOVING AROUND A LOT MORE THAN USUAL: NOT AT ALL
7. TROUBLE CONCENTRATING ON THINGS, SUCH AS READING THE NEWSPAPER OR WATCHING TELEVISION: NOT AT ALL
SUM OF ALL RESPONSES TO PHQ QUESTIONS 1-9: 4
5. POOR APPETITE OR OVEREATING: NOT AT ALL
9. THOUGHTS THAT YOU WOULD BE BETTER OFF DEAD, OR OF HURTING YOURSELF: NOT AT ALL
1. LITTLE INTEREST OR PLEASURE IN DOING THINGS: SEVERAL DAYS
4. FEELING TIRED OR HAVING LITTLE ENERGY: SEVERAL DAYS
10. IF YOU CHECKED OFF ANY PROBLEMS, HOW DIFFICULT HAVE THESE PROBLEMS MADE IT FOR YOU TO DO YOUR WORK, TAKE CARE OF THINGS AT HOME, OR GET ALONG WITH OTHER PEOPLE: NOT DIFFICULT AT ALL
SUM OF ALL RESPONSES TO PHQ QUESTIONS 1-9: 4
6. FEELING BAD ABOUT YOURSELF - OR THAT YOU ARE A FAILURE OR HAVE LET YOURSELF OR YOUR FAMILY DOWN: SEVERAL DAYS
3. TROUBLE FALLING OR STAYING ASLEEP: NOT AT ALL
SUM OF ALL RESPONSES TO PHQ QUESTIONS 1-9: 4
SUM OF ALL RESPONSES TO PHQ QUESTIONS 1-9: 4
10. IF YOU CHECKED OFF ANY PROBLEMS, HOW DIFFICULT HAVE THESE PROBLEMS MADE IT FOR YOU TO DO YOUR WORK, TAKE CARE OF THINGS AT HOME, OR GET ALONG WITH OTHER PEOPLE: NOT DIFFICULT AT ALL
6. FEELING BAD ABOUT YOURSELF - OR THAT YOU ARE A FAILURE OR HAVE LET YOURSELF OR YOUR FAMILY DOWN: SEVERAL DAYS

## 2025-05-13 DIAGNOSIS — E55.9 VITAMIN D DEFICIENCY: ICD-10-CM

## 2025-05-13 DIAGNOSIS — E78.00 PURE HYPERCHOLESTEROLEMIA: ICD-10-CM

## 2025-05-13 DIAGNOSIS — I10 ESSENTIAL HYPERTENSION: ICD-10-CM

## 2025-05-13 DIAGNOSIS — E03.9 ACQUIRED HYPOTHYROIDISM: ICD-10-CM

## 2025-05-13 DIAGNOSIS — E87.6 HYPOKALEMIA: ICD-10-CM

## 2025-05-13 LAB
25(OH)D3 SERPL-MCNC: 43.3 NG/ML
ALBUMIN SERPL-MCNC: 4.3 G/DL (ref 3.5–5.2)
ALP SERPL-CCNC: 89 U/L (ref 35–104)
ALT SERPL-CCNC: 19 U/L (ref 10–35)
ANION GAP SERPL CALCULATED.3IONS-SCNC: 12 MMOL/L (ref 8–16)
AST SERPL-CCNC: 19 U/L (ref 10–35)
BILIRUB SERPL-MCNC: <0.2 MG/DL (ref 0.2–1.2)
BUN SERPL-MCNC: 11 MG/DL (ref 6–20)
CALCIUM SERPL-MCNC: 9.3 MG/DL (ref 8.6–10)
CHLORIDE SERPL-SCNC: 105 MMOL/L (ref 98–107)
CHOLEST SERPL-MCNC: 206 MG/DL (ref 0–199)
CO2 SERPL-SCNC: 23 MMOL/L (ref 22–29)
CREAT SERPL-MCNC: 0.7 MG/DL (ref 0.5–0.9)
GLUCOSE SERPL-MCNC: 113 MG/DL (ref 70–99)
HDLC SERPL-MCNC: 61 MG/DL (ref 40–60)
LDLC SERPL CALC-MCNC: 118 MG/DL
POTASSIUM SERPL-SCNC: 3.9 MMOL/L (ref 3.5–5.1)
PROT SERPL-MCNC: 6.9 G/DL (ref 6.4–8.3)
SODIUM SERPL-SCNC: 140 MMOL/L (ref 136–145)
T3FREE SERPL-MCNC: 2.7 PG/ML (ref 2–4.4)
TRIGL SERPL-MCNC: 137 MG/DL (ref 0–149)
TSH SERPL DL<=0.005 MIU/L-ACNC: 3.11 UIU/ML (ref 0.27–4.2)

## 2025-05-14 ENCOUNTER — PATIENT MESSAGE (OUTPATIENT)
Dept: PRIMARY CARE CLINIC | Age: 45
End: 2025-05-14

## 2025-05-14 ENCOUNTER — OFFICE VISIT (OUTPATIENT)
Dept: PRIMARY CARE CLINIC | Age: 45
End: 2025-05-14
Payer: OTHER GOVERNMENT

## 2025-05-14 ENCOUNTER — TELEPHONE (OUTPATIENT)
Dept: PSYCHIATRY | Age: 45
End: 2025-05-14

## 2025-05-14 VITALS
OXYGEN SATURATION: 96 % | BODY MASS INDEX: 30.23 KG/M2 | DIASTOLIC BLOOD PRESSURE: 78 MMHG | HEART RATE: 78 BPM | WEIGHT: 193 LBS | TEMPERATURE: 98.4 F | SYSTOLIC BLOOD PRESSURE: 126 MMHG

## 2025-05-14 DIAGNOSIS — R73.9 HYPERGLYCEMIA: ICD-10-CM

## 2025-05-14 DIAGNOSIS — Z51.81 THERAPEUTIC DRUG MONITORING: ICD-10-CM

## 2025-05-14 DIAGNOSIS — E55.9 VITAMIN D DEFICIENCY: ICD-10-CM

## 2025-05-14 DIAGNOSIS — E78.00 PURE HYPERCHOLESTEROLEMIA: ICD-10-CM

## 2025-05-14 DIAGNOSIS — F33.0 MILD EPISODE OF RECURRENT MAJOR DEPRESSIVE DISORDER: ICD-10-CM

## 2025-05-14 DIAGNOSIS — E66.09 CLASS 1 OBESITY DUE TO EXCESS CALORIES WITH SERIOUS COMORBIDITY AND BODY MASS INDEX (BMI) OF 30.0 TO 30.9 IN ADULT: ICD-10-CM

## 2025-05-14 DIAGNOSIS — F41.1 GENERALIZED ANXIETY DISORDER: ICD-10-CM

## 2025-05-14 DIAGNOSIS — I10 ESSENTIAL HYPERTENSION: Primary | ICD-10-CM

## 2025-05-14 DIAGNOSIS — E03.9 ACQUIRED HYPOTHYROIDISM: ICD-10-CM

## 2025-05-14 DIAGNOSIS — E66.811 CLASS 1 OBESITY DUE TO EXCESS CALORIES WITH SERIOUS COMORBIDITY AND BODY MASS INDEX (BMI) OF 30.0 TO 30.9 IN ADULT: ICD-10-CM

## 2025-05-14 PROBLEM — E66.3 OVERWEIGHT (BMI 25.0-29.9): Status: RESOLVED | Noted: 2021-01-20 | Resolved: 2025-05-14

## 2025-05-14 LAB
ALCOHOL URINE: NORMAL
AMPHETAMINE SCREEN URINE: NORMAL
BARBITURATE SCREEN URINE: NORMAL
BENZODIAZEPINE SCREEN, URINE: NORMAL
BUPRENORPHINE URINE: NORMAL
COCAINE METABOLITE SCREEN URINE: NORMAL
FENTANYL SCREEN, URINE: NORMAL
GABAPENTIN SCREEN, URINE: NORMAL
MDMA, URINE: NORMAL
METHADONE SCREEN, URINE: NORMAL
METHAMPHETAMINE, URINE: NORMAL
OPIATE SCREEN URINE: NORMAL
OXYCODONE SCREEN URINE: NORMAL
PHENCYCLIDINE SCREEN URINE: NORMAL
PROPOXYPHENE SCREEN, URINE: NORMAL
SYNTHETIC CANNABINOIDS(K2) SCREEN, URINE: NORMAL
THC SCREEN, URINE: NORMAL
TRAMADOL SCREEN URINE: NORMAL
TRICYCLIC ANTIDEPRESSANTS, UR: NORMAL

## 2025-05-14 PROCEDURE — 80305 DRUG TEST PRSMV DIR OPT OBS: CPT | Performed by: INTERNAL MEDICINE

## 2025-05-14 PROCEDURE — 3078F DIAST BP <80 MM HG: CPT | Performed by: INTERNAL MEDICINE

## 2025-05-14 PROCEDURE — 99214 OFFICE O/P EST MOD 30 MIN: CPT | Performed by: INTERNAL MEDICINE

## 2025-05-14 PROCEDURE — 3074F SYST BP LT 130 MM HG: CPT | Performed by: INTERNAL MEDICINE

## 2025-05-14 RX ORDER — LEVOTHYROXINE SODIUM 88 UG/1
88 TABLET ORAL DAILY
Qty: 90 TABLET | Refills: 1 | Status: SHIPPED | OUTPATIENT
Start: 2025-05-14

## 2025-05-14 NOTE — TELEPHONE ENCOUNTER
Called pt to see if she wanted to take a sooner appt with our APRN Hay Villanueva.    Pt declined and will keep her appt that is scheduled with Dr. Armendariz on 06/02/25.    Electronically signed by Fernanda Loza MA on 5/14/2025 at 11:29 AM

## 2025-05-14 NOTE — PROGRESS NOTES
Darcie Ruiz is a 45 y.o. female who presents today for   Chief Complaint   Patient presents with    Follow-up     HTN, recheck mood, high cholesterol         History of Present Illness  The patient is a 45-year-old white female here for follow-up on essential hypertension, acquired hypothyroidism, pure hyperlipidemia, vitamin D deficiency, generalized anxiety disorder, and mild major depression.    She reports experiencing emotional distress characterized by increased tearfulness over the past weekend and continuing into today. She attributes this to concern for her sister-in-law who is currently undergoing surgery. Despite these emotional fluctuations, she has been managing well with her current medication regimen, which includes sertraline 50 mg. She does not express a need for dosage adjustment at this time. She also took buspirone 5 mg this morning, marking the first time she has used this medication in several months. She believes it provided some relief. She is under the care of a psychiatrist at [facility] who has informed her that the effects of buspirone are primarily placebo. She is seeking a 90-day refill of her sertraline prescription.    Her blood pressure management includes a daily regimen of lisinopril 20 mg and amlodipine 10 mg. Recent lab results indicate her TSH level is normal at 3.11, her free T3 level is normal at 2.7, and her vitamin D level is 43.3. She has discontinued her vitamin D supplementation. Her total cholesterol has improved from 236 on 10/22/2024 to 206 on 05/13/2025. Her LDL cholesterol has decreased from 139 to 118, though it remains slightly elevated. Her blood sugar level was 113, which is higher than the previous reading of 92 in 01/2024.     BMI Readings from Last 3 Encounters:   05/14/25 30.23 kg/m²   12/30/24 29.66 kg/m²   12/18/24 28.66 kg/m²     Wt Readings from Last 3 Encounters:   05/14/25 87.5 kg (193 lb)   12/30/24 85.9 kg (189 lb 6 oz)   12/18/24 83 kg (183 lb)

## 2025-05-14 NOTE — TELEPHONE ENCOUNTER
Spoke with pt to inform her that she could not do a VV due to her living in [place] and also that she need to be seen in office to have her blood pressure checked. She verbalized understanding and said ok.

## 2025-05-15 DIAGNOSIS — I10 ESSENTIAL HYPERTENSION: ICD-10-CM

## 2025-05-15 RX ORDER — LISINOPRIL 20 MG/1
20 TABLET ORAL DAILY
Qty: 30 TABLET | Refills: 5 | OUTPATIENT
Start: 2025-05-15

## 2025-05-15 NOTE — TELEPHONE ENCOUNTER
Darcie Ruiz called to request a refill on her medication.      Last office visit : 5/14/2025   Next office visit : Visit date not found     Requested Prescriptions     Pending Prescriptions Disp Refills    lisinopril (PRINIVIL;ZESTRIL) 20 MG tablet [Pharmacy Med Name: LISINOPRIL 20MG TABLET] 30 tablet 5     Sig: TAKE 1 TABLET BY MOUTH DAILY            Livier Salamanca MA

## 2025-05-29 ENCOUNTER — TELEPHONE (OUTPATIENT)
Dept: PSYCHIATRY | Age: 45
End: 2025-05-29

## 2025-05-29 NOTE — TELEPHONE ENCOUNTER
Appointment canceled for Darcie Ruiz ([id number])   Visit type:  FOLLOW UP   6/2/2025 9:00 AM (30 minutes) with Dr. Rose Armendariz MD in [facility]      Reason for cancellation: Error     Pt cancelled appt through [facility].  Pt declined to reschedule.  Pt is going to [facility] for her mental health needs.    Electronically signed by Fernanda Loza MA on 5/29/2025 at 3:18 PM

## 2025-06-12 DIAGNOSIS — I10 ESSENTIAL HYPERTENSION: ICD-10-CM

## 2025-06-12 RX ORDER — LISINOPRIL 20 MG/1
20 TABLET ORAL DAILY
Qty: 90 TABLET | Refills: 3 | OUTPATIENT
Start: 2025-06-12

## 2025-06-18 ENCOUNTER — OFFICE VISIT (OUTPATIENT)
Dept: PRIMARY CARE CLINIC | Age: 45
End: 2025-06-18
Payer: COMMERCIAL

## 2025-06-18 VITALS
HEIGHT: 67 IN | HEART RATE: 70 BPM | WEIGHT: 194.8 LBS | BODY MASS INDEX: 30.57 KG/M2 | OXYGEN SATURATION: 98 % | DIASTOLIC BLOOD PRESSURE: 68 MMHG | TEMPERATURE: 97.8 F | SYSTOLIC BLOOD PRESSURE: 110 MMHG

## 2025-06-18 DIAGNOSIS — H66.002 NON-RECURRENT ACUTE SUPPURATIVE OTITIS MEDIA OF LEFT EAR WITHOUT SPONTANEOUS RUPTURE OF TYMPANIC MEMBRANE: ICD-10-CM

## 2025-06-18 DIAGNOSIS — J01.00 ACUTE NON-RECURRENT MAXILLARY SINUSITIS: Primary | ICD-10-CM

## 2025-06-18 PROCEDURE — G8417 CALC BMI ABV UP PARAM F/U: HCPCS | Performed by: NURSE PRACTITIONER

## 2025-06-18 PROCEDURE — 3078F DIAST BP <80 MM HG: CPT | Performed by: NURSE PRACTITIONER

## 2025-06-18 PROCEDURE — 4004F PT TOBACCO SCREEN RCVD TLK: CPT | Performed by: NURSE PRACTITIONER

## 2025-06-18 PROCEDURE — 99214 OFFICE O/P EST MOD 30 MIN: CPT | Performed by: NURSE PRACTITIONER

## 2025-06-18 PROCEDURE — G8427 DOCREV CUR MEDS BY ELIG CLIN: HCPCS | Performed by: NURSE PRACTITIONER

## 2025-06-18 PROCEDURE — 3074F SYST BP LT 130 MM HG: CPT | Performed by: NURSE PRACTITIONER

## 2025-06-18 RX ORDER — AZITHROMYCIN 250 MG/1
TABLET, FILM COATED ORAL
Qty: 6 TABLET | Refills: 0 | Status: SHIPPED | OUTPATIENT
Start: 2025-06-18 | End: 2025-06-28

## 2025-06-18 RX ORDER — METHYLPREDNISOLONE 4 MG/1
TABLET ORAL
Qty: 1 KIT | Refills: 0 | Status: SHIPPED | OUTPATIENT
Start: 2025-06-18 | End: 2025-06-24

## 2025-06-18 NOTE — PROGRESS NOTES
[address] [place] [address] Drive   [address] [facility], [address]     Phone:  [phone]  Fax:  [phone]      Darcie Ruiz is a 45 y.o. female who presents today for her medical conditions/complaints as noted below.  Darcie Ruiz is c/o of Cough and Chest Congestion (X1 week)      Chief Complaint   Patient presents with    Cough    Chest Congestion     X1 week       HPI:     History of Present Illness  The patient presents for evaluation of a cough and congestion.    She reports a persistent cough and congestion, which she attributes to a cold that started on 06/11/2025. The cough is productive, and she experiences head pressure but no chest discomfort. She also reports a sensation of clogged ears but does not experience any wheezing. Symptoms are most pronounced upon waking, with an hour-long coughing episode. She has been self-medicating with Mucinex and vitamin C, but these have not alleviated her symptoms. She has not taken any other cough medications. She uses a CPAP machine for sleep and notes that she inadvertently used non-distilled water in the machine, which she suspects may have contributed to her symptoms. Her sleep position varies.         Past Medical History:   Diagnosis Date    Adjustment disorder with mixed anxiety and depressed mood     Allergic rhinitis, cause unspecified     Anxiety state, unspecified     Class 1 obesity due to excess calories with serious comorbidity and body mass index (BMI) of 30.0 to 30.9 in adult 5/14/2025    COVID-19 virus infection 12/16/2021    Symptoms started 12/8/21    Essential hypertension 2/8/2024    Facet arthritis, degenerative, L5-S1 level, lumbosacral spine 11/2/2021    Mild obstructive sleep apnea 3/4/2024    Palpitations     Paroxysmal SVT (supraventricular tachycardia) 09/03/2014    Radial styloid tenosynovitis     wrist pain while breast feeding.        Past Surgical History:   Procedure Laterality Date    WISDOM TOOTH EXTRACTION         Social

## 2025-06-25 ASSESSMENT — ENCOUNTER SYMPTOMS: SINUS PRESSURE: 1

## 2025-06-29 DIAGNOSIS — I10 ESSENTIAL HYPERTENSION: ICD-10-CM

## 2025-06-30 RX ORDER — AMLODIPINE BESYLATE 10 MG/1
10 TABLET ORAL DAILY
Qty: 90 TABLET | Refills: 1 | Status: SHIPPED | OUTPATIENT
Start: 2025-06-30